# Patient Record
Sex: MALE | Race: WHITE | NOT HISPANIC OR LATINO | Employment: OTHER | URBAN - METROPOLITAN AREA
[De-identification: names, ages, dates, MRNs, and addresses within clinical notes are randomized per-mention and may not be internally consistent; named-entity substitution may affect disease eponyms.]

---

## 2017-01-17 ENCOUNTER — TRANSFERRED RECORDS (OUTPATIENT)
Dept: HEALTH INFORMATION MANAGEMENT | Facility: CLINIC | Age: 62
End: 2017-01-17

## 2019-10-03 ENCOUNTER — HEALTH MAINTENANCE LETTER (OUTPATIENT)
Age: 64
End: 2019-10-03

## 2020-02-08 ENCOUNTER — HEALTH MAINTENANCE LETTER (OUTPATIENT)
Age: 65
End: 2020-02-08

## 2020-02-25 ENCOUNTER — TRANSFERRED RECORDS (OUTPATIENT)
Dept: HEALTH INFORMATION MANAGEMENT | Facility: CLINIC | Age: 65
End: 2020-02-25

## 2020-04-29 ENCOUNTER — MEDICAL CORRESPONDENCE (OUTPATIENT)
Dept: HEALTH INFORMATION MANAGEMENT | Facility: CLINIC | Age: 65
End: 2020-04-29

## 2020-04-29 ENCOUNTER — TRANSFERRED RECORDS (OUTPATIENT)
Dept: HEALTH INFORMATION MANAGEMENT | Facility: CLINIC | Age: 65
End: 2020-04-29

## 2020-04-29 ENCOUNTER — VIRTUAL VISIT (OUTPATIENT)
Dept: CARDIOLOGY | Facility: CLINIC | Age: 65
End: 2020-04-29
Payer: MEDICARE

## 2020-04-29 VITALS
WEIGHT: 287 LBS | SYSTOLIC BLOOD PRESSURE: 127 MMHG | HEART RATE: 53 BPM | BODY MASS INDEX: 41.18 KG/M2 | DIASTOLIC BLOOD PRESSURE: 62 MMHG

## 2020-04-29 DIAGNOSIS — R00.2 PALPITATIONS: ICD-10-CM

## 2020-04-29 DIAGNOSIS — I71.20 THORACIC AORTIC ANEURYSM WITHOUT RUPTURE (H): ICD-10-CM

## 2020-04-29 DIAGNOSIS — I20.0 UNSTABLE ANGINA PECTORIS (H): Primary | ICD-10-CM

## 2020-04-29 DIAGNOSIS — R09.02 HYPOXIA: ICD-10-CM

## 2020-04-29 PROCEDURE — 99214 OFFICE O/P EST MOD 30 MIN: CPT | Mod: 95 | Performed by: INTERNAL MEDICINE

## 2020-04-29 RX ORDER — TAMSULOSIN HYDROCHLORIDE 0.4 MG/1
0.8 CAPSULE ORAL DAILY
Status: ON HOLD | COMMUNITY
End: 2021-07-29

## 2020-04-29 RX ORDER — SERTRALINE HYDROCHLORIDE 100 MG/1
100 TABLET, FILM COATED ORAL DAILY
COMMUNITY
End: 2022-06-29

## 2020-04-29 RX ORDER — MECLIZINE HCL 25MG 25 MG/1
25 TABLET, CHEWABLE ORAL EVERY 6 HOURS PRN
COMMUNITY

## 2020-04-29 RX ORDER — ACETAMINOPHEN 500 MG
500-1000 TABLET ORAL EVERY 6 HOURS PRN
COMMUNITY

## 2020-04-29 RX ORDER — CETIRIZINE HYDROCHLORIDE 10 MG/1
10 TABLET ORAL DAILY
COMMUNITY
End: 2022-02-16

## 2020-04-29 NOTE — LETTER
4/29/2020    Calvin Desai MD    [facility]   [address] [place] [address]     RE: Aiden Almaraz  [address]  [facility] [address]       Dear Colleague,    Thank you for the opportunity to participate in the care of your patient, Aiden Almaraz, at the [facility] at [facility]. Please see a copy of my visit note below.     HISTORY OF PRESENT ILLNESS:  I had a [place] video conference with Aiden Almaraz.  I have not seen him for over 4 years.  When I saw him 4 years ago, he was having some atypical chest pain and a nuclear stress test was abnormal.  However, the patient was significantly overweight and there was concern if it was a false positive test.  Fortunately, the angiogram showed that he had a 35% LAD lesion and no other disease and so therefore, the EF from the nuclear test was falsely low and it was a false positive test.  He has a slightly dilated aortic aneurysm of 40 mm.  He has sleep apnea and sees Dr. No, although I do not know when he last saw her and he wears a CPAP.  Since I saw him 4 years ago, he had a concussion.  He apparently slipped on the ice, hit his head, was unconscious for some time.  Since that time, he has had memory issues and dizziness.  However, separate from that, this was a scheduled visit 4 years later to follow up on his aorta and see how he is doing and see his cholesterol numbers.  This ended up being a much longer visit than expected.  Because of COVID, none of the tests were done, although he did get blood work at his family doctor's office.  He reports glucose elevated at 125.  HDL low at 31.  He did give me the LDL number but the triglycerides were 197 and a total cholesterol of 143.  This is classic metabolic syndrome.  Separately, he mentions a nonspecific flutter in his chest, particularly when he lies down at night and it lasts for up to 5 minutes.  It  occurs every other day.  He also describes atypical chest discomfort, not like what he had 4 years ago.  He developed a bubble feeling or pressure in his chest that goes up towards his right neck.  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  He tells me that when he was in his doctor's office, they checked his O2 sat.  It was 86% and they told him to take several breaths in a row and it came up to 92%.  He does not have what sounds like much in the way of edema.  According to our chart, he sees Dr. No and he had PFTs some time ago that showed some mild restrictive lung disease and he does wear a CPAP machine.  He is overweight.  I am going to recommend the following:  Because of the COVID and trying to keep this easy for him, I am going to have him wear a Zio Patch to look and see if there are any arrhythmias or AFib or anything else that is going on when he describes these several-minute episodes of palpitation.  I am not going to do a nuclear stress test because it gave us a false positive reading once before but I am going to do an MRI stress test.  This will allow us to look and see if there has been progression of his underlying coronary disease.  It will also allow us to look at his aortic diameter to determine if it is getting larger.  It will give us a look at his right heart and I will do a limited bubble echo to make sure there is not a PFO.  I will see if we can also check his O2 sat at rest and then walking in the adams to see if by chance it drops.  If we do not find a cardiac cause for his hypoxia or anything else, I will send him back to the lung doctor and he may need formal oximetry to determine if he should be wearing oxygen during the day if indeed he really is hypoxic down to 86% during the daytime hours or particularly with exercise.  I asked the patient to Google metabolic syndrome and read about it  and work on diet and exercise.  I do not know if he had a hemoglobin A1c test with his family doctor.  That would be important because if it is elevated, metformin may help with his lipid abnormality also.       Today's visit was video conference for 33 minutes.     Please do not hesitate to contact me if you have any questions/concerns.     Sincerely,     Dago Mckeon MD    cc:    Susan No MD    [facility]   [address]   [place] [address]

## 2020-04-29 NOTE — PROGRESS NOTES
Service Date: 04/29/2020      VIRTUAL VISIT      [address] video conference from 1:30 p.m. to 2:03 p.m.       HISTORY OF PRESENT ILLNESS:  I had a [address] video conference with Aiden Almaraz.  I have not seen him for over 4 years.  When I saw him 4 years ago, he was having some atypical chest pain and a nuclear stress test was abnormal.  However, the patient was significantly overweight and there was concern if it was a false positive test.  Fortunately, the angiogram showed that he had a 35% LAD lesion and no other disease and so therefore, the EF from the nuclear test was falsely low and it was a false positive test.  He has a slightly dilated aortic aneurysm of 40 mm.  He has sleep apnea and sees Dr. No, although I do not know when he last saw her and he wears a CPAP.  Since I saw him 4 years ago, he had a concussion.  He apparently slipped on the ice, hit his head, was unconscious for some time.  Since that time, he has had memory issues and dizziness.  However, separate from that, this was a scheduled visit 4 years later to follow up on his aorta and see how he is doing and see his cholesterol numbers.  This ended up being a much longer visit than expected.  Because of COVID, none of the tests were done, although he did get blood work at his family doctor's office.  He reports glucose elevated at 125.  HDL low at 31.  He did give me the LDL number but the triglycerides were 197 and a total cholesterol of 143.  This is classic metabolic syndrome.  Separately, he mentions a nonspecific flutter in his chest, particularly when he lies down at night and it lasts for up to 5 minutes.  It occurs every other day.  He also describes atypical chest discomfort, not like what he had 4 years ago.  He developed a bubble feeling or pressure in his chest that goes up towards his right neck.  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  He tells me that when he was in his doctor's office, they checked his O2 sat.  It was 86% and they told him to take several breaths in a row and it came up to 92%.  He does not have what sounds like much in the way of edema.  According to our chart, he sees Dr. No and he had PFTs some time ago that showed some mild restrictive lung disease and he does wear a CPAP machine.  He is overweight.  I am going to recommend the following:  Because of the COVID and trying to keep this easy for him, I am going to have him wear a Zio Patch to look and see if there are any arrhythmias or AFib or anything else that is going on when he describes these several-minute episodes of palpitation.  I am not going to do a nuclear stress test because it gave us a false positive reading once before but I am going to do an MRI stress test.  This will allow us to look and see if there has been progression of his underlying coronary disease.  It will also allow us to look at his aortic diameter to determine if it is getting larger.  It will give us a look at his right heart and I will do a limited bubble echo to make sure there is not a PFO.  I will see if we can also check his O2 sat at rest and then walking in the adams to see if by chance it drops.  If we do not find a cardiac cause for his hypoxia or anything else, I will send him back to the lung doctor and he may need formal oximetry to determine if he should be wearing oxygen during the day if indeed he really is hypoxic down to 86% during the daytime hours or particularly with exercise.  I asked the patient to Google metabolic syndrome and read about it and work on diet and exercise.  I do not know if he had a hemoglobin A1c test with his family doctor.  That would be important because if it is elevated, metformin may help with his lipid abnormality also.       Today's visit was video conference for 33 minutes.      Dago Mckeon MD       cc:   Calvin Desai MD     [place] [address] [place] [address]      Susan No MD    [facility]   [address]   [place] [address]         XI THORNE MD             D: 2020   T: 2020   MT: [email or web address]      Name:     MATY NGUYỄN   MRN:      [id number]        Account:      [id number]   :      1955           Service Date: 2020      Document: [id number]

## 2020-04-29 NOTE — PROGRESS NOTES
"Review Of Systems:  Skin: NEGATIVE  Eyes:Ears/Nose/Throat: NEGATIVE  Respiratory: NEGATIVE  Cardiovascular:NEGATIVE  Gastrointestinal: NEGATIVE  Genitourinary:NEGATIVE   Musculoskeletal: NEGATIVE  Neurologic: NEGATIVE  Psychiatric: NEGATIVE  Hematologic/Lymphatic/Immunologic: NEGATIVE  Endocrine:  NEGATIVE    Reviewed:  4/29/20  AYUSH Lane        Aiden Almaraz is a 64 year old male who is being evaluated via a billable video visit.      The patient has been notified of following:     \"This video visit will be conducted via a call between you and your physician/provider. We have found that certain health care needs can be provided without the need for an in-person physical exam.  This service lets us provide the care you need with a video conversation.  If a prescription is necessary we can send it directly to your pharmacy.  If lab work is needed we can place an order for that and you can then stop by our lab to have the test done at a later time.    Video visits are billed at different rates depending on your insurance coverage.  Please reach out to your insurance provider with any questions.    If during the course of the call the physician/provider feels a video visit is not appropriate, you will not be charged for this service.\"    Patient has given verbal consent for Video visit? Yes  4/29/20  - Writer spoke w/pt via phone.  He is expecting the video visit w/Dr. Mike Lane LPN    How would you like to obtain your AVS? [facility]    Patient would like the video invitation sent by: Text to cell phone: [phone]    Will anyone else be joining your video visit?         Video-Visit Details    Type of service:  Video Visit    Video Start Time:130p  Video End Time:203p  Originating Location (pt. Location): Home    Distant Location (provider location):  [facility]     Mode of Communication:  Video Conference via [employer] s[employer]           "

## 2020-05-20 ENCOUNTER — TELEPHONE (OUTPATIENT)
Dept: CARDIOLOGY | Facility: CLINIC | Age: 65
End: 2020-05-20

## 2020-05-20 ENCOUNTER — TRANSFERRED RECORDS (OUTPATIENT)
Dept: HEALTH INFORMATION MANAGEMENT | Facility: CLINIC | Age: 65
End: 2020-05-20

## 2020-05-21 ENCOUNTER — HOSPITAL ENCOUNTER (OUTPATIENT)
Dept: CARDIOLOGY | Facility: CLINIC | Age: 65
Discharge: HOME | End: 2020-05-21
Attending: INTERNAL MEDICINE | Admitting: INTERNAL MEDICINE
Payer: MEDICARE

## 2020-05-21 VITALS — DIASTOLIC BLOOD PRESSURE: 66 MMHG | HEART RATE: 51 BPM | SYSTOLIC BLOOD PRESSURE: 113 MMHG

## 2020-05-21 DIAGNOSIS — R00.2 PALPITATIONS: ICD-10-CM

## 2020-05-21 DIAGNOSIS — I20.0 UNSTABLE ANGINA PECTORIS (H): ICD-10-CM

## 2020-05-21 DIAGNOSIS — I71.20 THORACIC AORTIC ANEURYSM WITHOUT RUPTURE (H): ICD-10-CM

## 2020-05-21 DIAGNOSIS — R09.02 HYPOXIA: ICD-10-CM

## 2020-05-21 DIAGNOSIS — I25.10 CAD (CORONARY ARTERY DISEASE): Primary | ICD-10-CM

## 2020-05-21 LAB
CREAT BLD-MCNC: 1.2 MG/DL (ref 0.66–1.25)
GFR SERPL CREATININE-BSD FRML MDRD: 61 ML/MIN/{1.73_M2}

## 2020-05-21 PROCEDURE — 0296T ZIO PATCH HOLTER ADULT PEDIATRIC GREATER THAN 48 HRS: CPT

## 2020-05-21 PROCEDURE — 93321 DOPPLER ECHO F-UP/LMTD STD: CPT | Mod: 26 | Performed by: INTERNAL MEDICINE

## 2020-05-21 PROCEDURE — 75563 CARD MRI W/STRESS IMG & DYE: CPT | Mod: 26 | Performed by: INTERNAL MEDICINE

## 2020-05-21 PROCEDURE — 25000128 H RX IP 250 OP 636: Performed by: INTERNAL MEDICINE

## 2020-05-21 PROCEDURE — 82565 ASSAY OF CREATININE: CPT

## 2020-05-21 PROCEDURE — 93325 DOPPLER ECHO COLOR FLOW MAPG: CPT | Mod: 26 | Performed by: INTERNAL MEDICINE

## 2020-05-21 PROCEDURE — A9585 GADOBUTROL INJECTION: HCPCS | Performed by: INTERNAL MEDICINE

## 2020-05-21 PROCEDURE — 40000264 ECHOCARDIOGRAM LIMITED

## 2020-05-21 PROCEDURE — 93016 CV STRESS TEST SUPVJ ONLY: CPT | Performed by: INTERNAL MEDICINE

## 2020-05-21 PROCEDURE — 93017 CV STRESS TEST TRACING ONLY: CPT

## 2020-05-21 PROCEDURE — 25500064 ZZH RX 255 OP 636: Performed by: INTERNAL MEDICINE

## 2020-05-21 PROCEDURE — 0298T ZIO PATCH HOLTER ADULT PEDIATRIC GREATER THAN 48 HRS: CPT | Performed by: INTERNAL MEDICINE

## 2020-05-21 PROCEDURE — 93308 TTE F-UP OR LMTD: CPT | Mod: 26 | Performed by: INTERNAL MEDICINE

## 2020-05-21 PROCEDURE — 93018 CV STRESS TEST I&R ONLY: CPT | Performed by: INTERNAL MEDICINE

## 2020-05-21 RX ORDER — GADOBUTROL 604.72 MG/ML
5-65 INJECTION INTRAVENOUS ONCE
Status: COMPLETED | OUTPATIENT
Start: 2020-05-21 | End: 2020-05-21

## 2020-05-21 RX ORDER — ALBUTEROL SULFATE 90 UG/1
2 AEROSOL, METERED RESPIRATORY (INHALATION) EVERY 5 MIN PRN
Status: DISCONTINUED | OUTPATIENT
Start: 2020-05-21 | End: 2020-05-22 | Stop reason: HOSPADM

## 2020-05-21 RX ORDER — METHYLPREDNISOLONE SODIUM SUCCINATE 125 MG/2ML
125 INJECTION, POWDER, LYOPHILIZED, FOR SOLUTION INTRAMUSCULAR; INTRAVENOUS
Status: DISCONTINUED | OUTPATIENT
Start: 2020-05-21 | End: 2020-05-22 | Stop reason: HOSPADM

## 2020-05-21 RX ORDER — REGADENOSON 0.08 MG/ML
0.4 INJECTION, SOLUTION INTRAVENOUS ONCE
Status: DISCONTINUED | OUTPATIENT
Start: 2020-05-21 | End: 2020-05-22 | Stop reason: HOSPADM

## 2020-05-21 RX ORDER — ONDANSETRON 2 MG/ML
4 INJECTION INTRAMUSCULAR; INTRAVENOUS
Status: DISCONTINUED | OUTPATIENT
Start: 2020-05-21 | End: 2020-05-22 | Stop reason: HOSPADM

## 2020-05-21 RX ORDER — REGADENOSON 0.08 MG/ML
0.4 INJECTION, SOLUTION INTRAVENOUS ONCE
Status: COMPLETED | OUTPATIENT
Start: 2020-05-21 | End: 2020-05-21

## 2020-05-21 RX ORDER — AMINOPHYLLINE 25 MG/ML
50-100 INJECTION, SOLUTION INTRAVENOUS
Status: DISCONTINUED | OUTPATIENT
Start: 2020-05-21 | End: 2020-05-22 | Stop reason: HOSPADM

## 2020-05-21 RX ORDER — AMINOPHYLLINE 25 MG/ML
100 INJECTION, SOLUTION INTRAVENOUS ONCE
Status: DISCONTINUED | OUTPATIENT
Start: 2020-05-21 | End: 2020-05-22 | Stop reason: HOSPADM

## 2020-05-21 RX ORDER — ACYCLOVIR 200 MG/1
0-1 CAPSULE ORAL
Status: DISCONTINUED | OUTPATIENT
Start: 2020-05-21 | End: 2020-05-22 | Stop reason: HOSPADM

## 2020-05-21 RX ORDER — DIPHENHYDRAMINE HYDROCHLORIDE 50 MG/ML
25-50 INJECTION INTRAMUSCULAR; INTRAVENOUS
Status: DISCONTINUED | OUTPATIENT
Start: 2020-05-21 | End: 2020-05-22 | Stop reason: HOSPADM

## 2020-05-21 RX ORDER — DIPHENHYDRAMINE HCL 25 MG
25 CAPSULE ORAL
Status: DISCONTINUED | OUTPATIENT
Start: 2020-05-21 | End: 2020-05-22 | Stop reason: HOSPADM

## 2020-05-21 RX ORDER — DIAZEPAM 5 MG
5 TABLET ORAL EVERY 30 MIN PRN
Status: DISCONTINUED | OUTPATIENT
Start: 2020-05-21 | End: 2020-05-22 | Stop reason: HOSPADM

## 2020-05-21 RX ADMIN — HUMAN ALBUMIN MICROSPHERES AND PERFLUTREN 9 ML: 10; .22 INJECTION, SOLUTION INTRAVENOUS at 10:45

## 2020-05-21 RX ADMIN — GADOBUTROL 32 ML: 604.72 INJECTION INTRAVENOUS at 09:49

## 2020-05-21 RX ADMIN — REGADENOSON 0.4 MG: 0.08 INJECTION, SOLUTION INTRAVENOUS at 09:04

## 2020-05-28 ENCOUNTER — MYC MEDICAL ADVICE (OUTPATIENT)
Dept: CARDIOLOGY | Facility: CLINIC | Age: 65
End: 2020-05-28

## 2020-06-22 ENCOUNTER — VIRTUAL VISIT (OUTPATIENT)
Dept: CARDIOLOGY | Facility: CLINIC | Age: 65
End: 2020-06-22
Attending: INTERNAL MEDICINE
Payer: MEDICARE

## 2020-06-22 VITALS
HEART RATE: 74 BPM | WEIGHT: 278 LBS | SYSTOLIC BLOOD PRESSURE: 134 MMHG | BODY MASS INDEX: 39.89 KG/M2 | DIASTOLIC BLOOD PRESSURE: 76 MMHG

## 2020-06-22 DIAGNOSIS — I20.0 UNSTABLE ANGINA PECTORIS (H): ICD-10-CM

## 2020-06-22 DIAGNOSIS — R00.2 PALPITATIONS: ICD-10-CM

## 2020-06-22 DIAGNOSIS — I26.09 ACUTE COR PULMONALE (H): Primary | ICD-10-CM

## 2020-06-22 DIAGNOSIS — R09.02 HYPOXIA: ICD-10-CM

## 2020-06-22 DIAGNOSIS — I71.20 THORACIC AORTIC ANEURYSM WITHOUT RUPTURE (H): ICD-10-CM

## 2020-06-22 PROCEDURE — 99214 OFFICE O/P EST MOD 30 MIN: CPT | Mod: 95 | Performed by: INTERNAL MEDICINE

## 2020-06-22 NOTE — PROGRESS NOTES
"Aiden Almaraz is a 64 year old male who is being evaluated via a billable video visit.      The patient has been notified of following:     \"This video visit will be conducted via a call between you and your physician/provider. We have found that certain health care needs can be provided without the need for an in-person physical exam.  This service lets us provide the care you need with a video conversation.  If a prescription is necessary we can send it directly to your pharmacy.  If lab work is needed we can place an order for that and you can then stop by our lab to have the test done at a later time.    Video visits are billed at different rates depending on your insurance coverage.  Please reach out to your insurance provider with any questions.    If during the course of the call the physician/provider feels a video visit is not appropriate, you will not be charged for this service.\"    Patient has given verbal consent for Video visit? Yes    Please text patient at [phone]    Will anyone else be joining your video visit? No        Video-Visit Details    Type of service:  Video Visit    Video Start Time: 844Video End Time: 902    Originating Location (pt. Location):[facility]  Distant Location (provider location):  [facility]     Platform used for Video Visit: TAZ THORNE          Review Of Systems  Skin: pigmentation, Lower legs purplish in color   Eyes: negative  Ears/Nose/Throat: negative  Respiratory: Shortness of breath- SOB when laying in bed needs to breath through mouth   Cardiovascular: lower extremity edema  Gastrointestinal: reflux  Genitourinary: frequency  Musculoskeletal: joint pain  Neurologic: negative  Psychiatric: excessive stress, anxiety   Hematologic/Lymphatic/Immunologic: negative  Endocrine: negative    Patient reported vitals:  BP: 134/76  Heart rate:74  Weight:278lb    Ayesha Godwin CMA    "

## 2020-06-22 NOTE — PROGRESS NOTES
Service Date: 06/22/2020      VIDEO VIRTUAL VISIT      HISTORY OF PRESENT ILLNESS:  This is a video virtual office visit on Aiden Almaraz from 8:44 a.m. to 9:02 a.m.  It is a followup from my visit date 04/29/2020.  The reader is referred to that.  Basically, this gentleman has been a patient of ours for a number of years.  I had not seen him for 4 years before this.  He had atypical chest pain in 2016.  A nuclear stress test was abnormal, but we were concerned because of body habitus it was false positive.  Indeed, it was.  The angiogram, left heart catheterization only, showed a 35% LAD lesion.  The ejection fraction was actually normal.  The nuclear test called both a low EF and reduced blood flow, but that was not true.  He also had mild aortic root enlargement.  He was being seen by Dr. No from Pulmonary Medicine and he has sleep apnea.  He does wear CPAP.  He has now switched over to [facility].  He had a previous episode of concussion which has left him with some memory issues and dizziness.  When I got my attention in the April visit was that he still reports some atypical chest pain, but he also mentioned some palpitations, significant fatigue, shortness of breath and he told me at his doctor's office, and they noted that his resting O2 sat was 86%.    They asked him to take several breaths in a row and it came up to 92.  Our records show that with Dr. No several years ago, pulmonary function test showed mild restrictive lung disease, and he does, as I mention, use CPAP.  He told me [facility] recently did lung capacity studies which I assume are either full pulmonary function tests were perhaps just limited office and that everything looks good.  He also mentioned fluttering.  Because of the atypical chest pain and the previous falsely abnormal nuclear test, I elected to do several tests.  We did an MRI test for ischemia.  We did an echocardiogram with bubble study to look for  pulmonary hypertension and a possible PFO to account for the hypoxia and we did a 14-day Zio Patch.  The Zio Patch showed rare PACs and PVCs, no atrial flutter, no atrial fib.  The echocardiogram was limited and they had difficulty because of again body habitus.  They report left ventricular size and function normal with perhaps some mild hypokinesis of the basal inferolateral wall.  The right ventricle, however, they report is moderately dilated with moderate decrease in RV systolic function.  They again report mildly dilated aorta at 40 mm at the base and 42 mm in the ascending portion.  The bubble study to look for PFO they said was suboptimal and they did not comment on it.   The tricuspid valve was not well seen, and they could not estimate right ventricular systolic pressures.  It should be noted that in 2015 we did do a transthoracic echo, and at that time, they reported no PFO or atrial septal defect.  They were not able to estimate right heart pressures at that time.  At that time, they said the right heart size and function was normal.  On the cardiac MRI from last month, they report left ventricle is normal in size and function, no wall motion abnormality.  The right ventricle they again note is abnormal.  They report some nonspecific scarring noted in the RV attachment region in the inferior septum, which can occasionally be seen with pulmonary hypertension.  They do note mildly dilated right ventricle with mild decrease in RV systolic function, quantitative RVEF of 54%.  LVEF 70%.  The stress perfusion test was negative for any inducible ischemia.  At this point, my suspicion is that this is going to probably be some form of cor pulmonale.  Perhaps pickwickian syndrome type.  My suspicion for shunt is relatively low because with extra breath reportedly his oxygen level normalized, and usually with a shunt, one cannot normalize it with supplemental oxygen or extra breathing.  Again, I suspect this is  going to be hypoventilation related to his body habitus.  I am going to recommend the following:  We will do a transesophageal echo to get another look and make sure there is no abnormal connections such as anomalous pulmonary venous return, although that should have been seen on the MRI.  We will do a bubble study again just to make sure that there is not atrial septal defect or PFO.  We will then do a right heart catheterization to determine if there is pulmonary hypertension or not.  After we obtain that, I think we should send him back to [facility].  He probably should be wearing oxygen or at least we should measure oxygen level when he is walking.  He told me the last time his doctor checked, it was 92%, but as I mentioned, it was actually 86% before the deep breathing.      cc:   Calvin Desai MD   [address]  [address]      [facility]   Pulmonary Clinic   [address] [place]  [address]         XI THORNE MD             D: 2020   T: 2020   MT: [email or web address]      Name:     MATY NGUYỄN   MRN:      [id number]        Account:      [id number]   :      1955           Service Date: 2020      Document: [id number]

## 2020-06-22 NOTE — LETTER
6/22/2020    Calvin SHRESTHA Giselle  [facility] [address] [address]    RE: Aiden Almaraz       Dear Colleague,    I had the pleasure of seeing Aiden Almaraz in the [facility].    Aiden Almaraz is a 64 year old male who is being evaluated via a billable video visit.        Service Date: 06/22/2020      VIDEO VIRTUAL VISIT      HISTORY OF PRESENT ILLNESS:  This is a video virtual office visit on Aiden Almaraz from 8:44 a.m. to 9:02 a.m.  It is a followup from my visit date 04/29/2020.  The reader is referred to that.  Basically, this gentleman has been a patient of ours for a number of years.  I had not seen him for 4 years before this.  He had atypical chest pain in 2016.  A nuclear stress test was abnormal, but we were concerned because of body habitus it was false positive.  Indeed, it was.  The angiogram, left heart catheterization only, showed a 35% LAD lesion.  The ejection fraction was actually normal.  The nuclear test called both a low EF and reduced blood flow, but that was not true.  He also had mild aortic root enlargement.  He was being seen by Dr. No from Pulmonary Medicine and he has sleep apnea.  He does wear CPAP.  He has now switched over to [facility].  He had a previous episode of concussion which has left him with some memory issues and dizziness.  When I got my attention in the April visit was that he still reports some atypical chest pain, but he also mentioned some palpitations, significant fatigue, shortness of breath and he told me at his doctor's office, and they noted that his resting O2 sat was 86%.    They asked him to take several breaths in a row and it came up to 92.  Our records show that with Dr. No several years ago, pulmonary function test showed mild restrictive lung disease, and he does, as I mention, use CPAP.  He told me [facility] recently did lung capacity studies which I assume are either  full pulmonary function tests were perhaps just limited office and that everything looks good.  He also mentioned fluttering.  Because of the atypical chest pain and the previous falsely abnormal nuclear test, I elected to do several tests.  We did an MRI test for ischemia.  We did an echocardiogram with bubble study to look for pulmonary hypertension and a possible PFO to account for the hypoxia and we did a 14-day Zio Patch.  The Zio Patch showed rare PACs and PVCs, no atrial flutter, no atrial fib.  The echocardiogram was limited and they had difficulty because of again body habitus.  They report left ventricular size and function normal with perhaps some mild hypokinesis of the basal inferolateral wall.  The right ventricle, however, they report is moderately dilated with moderate decrease in RV systolic function.  They again report mildly dilated aorta at 40 mm at the base and 42 mm in the ascending portion.  The bubble study to look for PFO they said was suboptimal and they did not comment on it.   The tricuspid valve was not well seen, and they could not estimate right ventricular systolic pressures.  It should be noted that in 2015 we did do a transthoracic echo, and at that time, they reported no PFO or atrial septal defect.  They were not able to estimate right heart pressures at that time.  At that time, they said the right heart size and function was normal.  On the cardiac MRI from last month, they report left ventricle is normal in size and function, no wall motion abnormality.  The right ventricle they again note is abnormal.  They report some nonspecific scarring noted in the RV attachment region in the inferior septum, which can occasionally be seen with pulmonary hypertension.  They do note mildly dilated right ventricle with mild decrease in RV systolic function, quantitative RVEF of 54%.  LVEF 70%.  The stress perfusion test was negative for any inducible ischemia.  At this point, my suspicion is  that this is going to probably be some form of cor pulmonale.  Perhaps pickwickian syndrome type.  My suspicion for shunt is relatively low because with extra breath reportedly his oxygen level normalized, and usually with a shunt, one cannot normalize it with supplemental oxygen or extra breathing.  Again, I suspect this is going to be hypoventilation related to his body habitus.  I am going to recommend the following:  We will do a transesophageal echo to get another look and make sure there is no abnormal connections such as anomalous pulmonary venous return, although that should have been seen on the MRI.  We will do a bubble study again just to make sure that there is not atrial septal defect or PFO.  We will then do a right heart catheterization to determine if there is pulmonary hypertension or not.  After we obtain that, I think we should send him back to [facility].  He probably should be wearing oxygen or at least we should measure oxygen level when he is walking.  He told me the last time his doctor checked, it was 92%, but as I mentioned, it was actually 86% before the deep breathing.      Thank you for allowing me to participate in the care of your patient.    Sincerely,     Dago Mckeon MD     [facility]    cc:   [facility]   Pulmonary Clinic   [address] [place]  [address]

## 2020-06-25 ENCOUNTER — HOSPITAL ENCOUNTER (OUTPATIENT)
Facility: CLINIC | Age: 65
End: 2020-06-25
Payer: MEDICARE

## 2020-06-25 DIAGNOSIS — Z11.59 ENCOUNTER FOR SCREENING FOR OTHER VIRAL DISEASES: Primary | ICD-10-CM

## 2020-06-25 DIAGNOSIS — R09.02 HYPOXIA: ICD-10-CM

## 2020-06-25 DIAGNOSIS — I26.09 ACUTE COR PULMONALE (H): ICD-10-CM

## 2020-07-06 ENCOUNTER — AMBULATORY - HEALTHEAST (OUTPATIENT)
Dept: INTERNAL MEDICINE | Facility: CLINIC | Age: 65
End: 2020-07-06

## 2020-07-06 DIAGNOSIS — Z11.59 ENCOUNTER FOR SCREENING FOR OTHER VIRAL DISEASES: ICD-10-CM

## 2020-07-07 ENCOUNTER — AMBULATORY - HEALTHEAST (OUTPATIENT)
Dept: FAMILY MEDICINE | Facility: CLINIC | Age: 65
End: 2020-07-07

## 2020-07-07 ENCOUNTER — TELEPHONE (OUTPATIENT)
Dept: CARDIOLOGY | Facility: CLINIC | Age: 65
End: 2020-07-07

## 2020-07-07 DIAGNOSIS — R09.02 HYPOXIA: Primary | ICD-10-CM

## 2020-07-07 DIAGNOSIS — I27.20 PULMONARY HYPERTENSION (H): ICD-10-CM

## 2020-07-07 DIAGNOSIS — Z11.59 ENCOUNTER FOR SCREENING FOR OTHER VIRAL DISEASES: ICD-10-CM

## 2020-07-07 RX ORDER — LIDOCAINE 40 MG/G
CREAM TOPICAL
Status: CANCELLED | OUTPATIENT
Start: 2020-07-07

## 2020-07-07 RX ORDER — POTASSIUM CHLORIDE 750 MG/1
20 TABLET, EXTENDED RELEASE ORAL
Status: CANCELLED | OUTPATIENT
Start: 2020-07-07

## 2020-07-07 RX ORDER — ASPIRIN 81 MG/1
81 TABLET ORAL DAILY
Status: CANCELLED | OUTPATIENT
Start: 2020-07-07 | End: 2020-07-09

## 2020-07-07 RX ORDER — SODIUM CHLORIDE 9 MG/ML
INJECTION, SOLUTION INTRAVENOUS CONTINUOUS
Status: CANCELLED | OUTPATIENT
Start: 2020-07-07

## 2020-07-07 NOTE — PROGRESS NOTES
Orders entered: Right heart cath order set.        Kirit RN, BSN    [facility]

## 2020-07-07 NOTE — TELEPHONE ENCOUNTER
Patient is scheduled for a LUIS A and Right Coronary Angiogram    Date: 07-09-20  Location: [facility]   Check-in time: 630 am     LUIS A  Procedure time: 830 am    Right Coronary Angiogram    Procedure time: 10 am     See instructions below...     Patient to be NPO after midnight, the night before the procedure.     Patient to avoid antacids on the morning of the procedure.     Patient does take 81 mg of Aspirin daily in the evening.     Patient will take 81 mg of Aspirin on the day before the procedure in the am, and 81 mg of Aspirin on the morning of the procedure. On 07-10-20, patient may resume and take his Aspirin in the evening as before.        Patient should also take Coreg on the morning of the procedure with small sips of water.    Patient should take the rest of his medications when he returns home.     Patient will need someone to drive him home and someone to stay with him for 24 hours after the procedure.     Prep instructions were reviewed with patient over the phone: Yes  Patient was advised to quarantine until procedure: Yes    COVID-19 testing completed today, results pending.    Reviewed visitor restriction: Yes  Patient informed to wear a mask: Yes      Instructions were also sent to patient via [facility].     Kirit RN, BSN  [facility]

## 2020-07-08 LAB
SARS-COV-2 PCR COMMENT: NORMAL
SARS-COV-2 RNA SPEC QL NAA+PROBE: NEGATIVE
SARS-COV-2 VIRUS SPECIMEN SOURCE: NORMAL

## 2020-07-09 ENCOUNTER — HOSPITAL ENCOUNTER (OUTPATIENT)
Facility: CLINIC | Age: 65
Discharge: HOME | End: 2020-07-09
Attending: INTERNAL MEDICINE | Admitting: INTERNAL MEDICINE
Payer: MEDICARE

## 2020-07-09 ENCOUNTER — HOSPITAL ENCOUNTER (OUTPATIENT)
Dept: CARDIOLOGY | Facility: CLINIC | Age: 65
End: 2020-07-09
Attending: INTERNAL MEDICINE | Admitting: INTERNAL MEDICINE
Payer: MEDICARE

## 2020-07-09 VITALS
TEMPERATURE: 98.2 F | OXYGEN SATURATION: 96 % | DIASTOLIC BLOOD PRESSURE: 75 MMHG | HEIGHT: 70 IN | BODY MASS INDEX: 40.8 KG/M2 | WEIGHT: 285 LBS | RESPIRATION RATE: 16 BRPM | HEART RATE: 56 BPM | SYSTOLIC BLOOD PRESSURE: 120 MMHG

## 2020-07-09 DIAGNOSIS — I26.09 ACUTE COR PULMONALE (H): ICD-10-CM

## 2020-07-09 DIAGNOSIS — R09.02 HYPOXIA: ICD-10-CM

## 2020-07-09 DIAGNOSIS — I27.20 PULMONARY HYPERTENSION (H): ICD-10-CM

## 2020-07-09 PROBLEM — Z98.890 STATUS POST CORONARY ANGIOGRAM: Status: ACTIVE | Noted: 2020-07-09

## 2020-07-09 LAB
ANION GAP SERPL CALCULATED.3IONS-SCNC: 5 MMOL/L (ref 3–14)
APTT PPP: 30 SEC (ref 22–37)
BUN SERPL-MCNC: 24 MG/DL (ref 7–30)
CALCIUM SERPL-MCNC: 8.6 MG/DL (ref 8.5–10.1)
CHLORIDE SERPL-SCNC: 111 MMOL/L (ref 94–109)
CO2 BLD-SCNC: 25 MMOL/L (ref 21–28)
CO2 SERPL-SCNC: 25 MMOL/L (ref 20–32)
CREAT SERPL-MCNC: 0.97 MG/DL (ref 0.66–1.25)
ERYTHROCYTE [DISTWIDTH] IN BLOOD BY AUTOMATED COUNT: 13.7 % (ref 10–15)
GFR SERPL CREATININE-BSD FRML MDRD: 81 ML/MIN/{1.73_M2}
GLUCOSE SERPL-MCNC: 130 MG/DL (ref 70–99)
HCT VFR BLD AUTO: 42.9 % (ref 40–53)
HGB BLD-MCNC: 14.2 G/DL (ref 13.3–17.7)
INR PPP: 1.09 (ref 0.86–1.14)
MCH RBC QN AUTO: 31 PG (ref 26.5–33)
MCHC RBC AUTO-ENTMCNC: 33.1 G/DL (ref 31.5–36.5)
MCV RBC AUTO: 94 FL (ref 78–100)
PCO2 BLD: 46 MM HG (ref 35–45)
PH BLD: 7.34 PH (ref 7.35–7.45)
PLATELET # BLD AUTO: 149 10E9/L (ref 150–450)
PO2 BLD: 36 MM HG (ref 80–105)
POTASSIUM SERPL-SCNC: 3.8 MMOL/L (ref 3.4–5.3)
RBC # BLD AUTO: 4.58 10E12/L (ref 4.4–5.9)
SAO2 % BLDA FROM PO2: 65 % (ref 92–100)
SODIUM SERPL-SCNC: 141 MMOL/L (ref 133–144)
WBC # BLD AUTO: 6.9 10E9/L (ref 4–11)

## 2020-07-09 PROCEDURE — 93451 RIGHT HEART CATH: CPT | Performed by: INTERNAL MEDICINE

## 2020-07-09 PROCEDURE — 93005 ELECTROCARDIOGRAM TRACING: CPT

## 2020-07-09 PROCEDURE — 99152 MOD SED SAME PHYS/QHP 5/>YRS: CPT | Performed by: INTERNAL MEDICINE

## 2020-07-09 PROCEDURE — 40000852 ZZH STATISTIC HEART CATH LAB OR EP LAB

## 2020-07-09 PROCEDURE — 93320 DOPPLER ECHO COMPLETE: CPT | Mod: 26 | Performed by: INTERNAL MEDICINE

## 2020-07-09 PROCEDURE — 93325 DOPPLER ECHO COLOR FLOW MAPG: CPT | Mod: 26 | Performed by: INTERNAL MEDICINE

## 2020-07-09 PROCEDURE — 93325 DOPPLER ECHO COLOR FLOW MAPG: CPT

## 2020-07-09 PROCEDURE — 93312 ECHO TRANSESOPHAGEAL: CPT | Mod: 26 | Performed by: INTERNAL MEDICINE

## 2020-07-09 PROCEDURE — 82803 BLOOD GASES ANY COMBINATION: CPT

## 2020-07-09 PROCEDURE — 85027 COMPLETE CBC AUTOMATED: CPT | Performed by: INTERNAL MEDICINE

## 2020-07-09 PROCEDURE — 40000065 ZZH STATISTIC EKG NON-CHARGEABLE

## 2020-07-09 PROCEDURE — 25000125 ZZHC RX 250: Performed by: INTERNAL MEDICINE

## 2020-07-09 PROCEDURE — 85610 PROTHROMBIN TIME: CPT | Performed by: INTERNAL MEDICINE

## 2020-07-09 PROCEDURE — 25000128 H RX IP 250 OP 636: Performed by: INTERNAL MEDICINE

## 2020-07-09 PROCEDURE — 27210794 ZZH OR GENERAL SUPPLY STERILE: Performed by: INTERNAL MEDICINE

## 2020-07-09 PROCEDURE — 36415 COLL VENOUS BLD VENIPUNCTURE: CPT | Performed by: INTERNAL MEDICINE

## 2020-07-09 PROCEDURE — 85730 THROMBOPLASTIN TIME PARTIAL: CPT | Performed by: INTERNAL MEDICINE

## 2020-07-09 PROCEDURE — 93010 ELECTROCARDIOGRAM REPORT: CPT | Performed by: INTERNAL MEDICINE

## 2020-07-09 PROCEDURE — 40000857 ZZH STATISTIC TEE INCLUDES SEDATION

## 2020-07-09 PROCEDURE — 25800030 ZZH RX IP 258 OP 636: Performed by: INTERNAL MEDICINE

## 2020-07-09 PROCEDURE — C1769 GUIDE WIRE: HCPCS | Performed by: INTERNAL MEDICINE

## 2020-07-09 PROCEDURE — 40000235 ZZH STATISTIC TELEMETRY

## 2020-07-09 PROCEDURE — 80048 BASIC METABOLIC PNL TOTAL CA: CPT | Performed by: INTERNAL MEDICINE

## 2020-07-09 RX ORDER — SODIUM CHLORIDE 9 MG/ML
INJECTION, SOLUTION INTRAVENOUS CONTINUOUS
Status: DISCONTINUED | OUTPATIENT
Start: 2020-07-09 | End: 2020-07-09 | Stop reason: HOSPADM

## 2020-07-09 RX ORDER — LIDOCAINE 50 MG/G
OINTMENT TOPICAL ONCE
Status: COMPLETED | OUTPATIENT
Start: 2020-07-09 | End: 2020-07-09

## 2020-07-09 RX ORDER — NALOXONE HYDROCHLORIDE 0.4 MG/ML
.1-.4 INJECTION, SOLUTION INTRAMUSCULAR; INTRAVENOUS; SUBCUTANEOUS
Status: DISCONTINUED | OUTPATIENT
Start: 2020-07-09 | End: 2020-07-09 | Stop reason: HOSPADM

## 2020-07-09 RX ORDER — GLYCOPYRROLATE 0.2 MG/ML
0.1 INJECTION, SOLUTION INTRAMUSCULAR; INTRAVENOUS ONCE
Status: COMPLETED | OUTPATIENT
Start: 2020-07-09 | End: 2020-07-09

## 2020-07-09 RX ORDER — LIDOCAINE 40 MG/G
CREAM TOPICAL
Status: DISCONTINUED | OUTPATIENT
Start: 2020-07-09 | End: 2020-07-09 | Stop reason: HOSPADM

## 2020-07-09 RX ORDER — FLUMAZENIL 0.1 MG/ML
0.2 INJECTION, SOLUTION INTRAVENOUS
Status: DISCONTINUED | OUTPATIENT
Start: 2020-07-09 | End: 2020-07-09 | Stop reason: HOSPADM

## 2020-07-09 RX ORDER — NALOXONE HYDROCHLORIDE 0.4 MG/ML
.2-.4 INJECTION, SOLUTION INTRAMUSCULAR; INTRAVENOUS; SUBCUTANEOUS
Status: DISCONTINUED | OUTPATIENT
Start: 2020-07-09 | End: 2020-07-09 | Stop reason: HOSPADM

## 2020-07-09 RX ORDER — FENTANYL CITRATE 50 UG/ML
25 INJECTION, SOLUTION INTRAMUSCULAR; INTRAVENOUS
Status: DISCONTINUED | OUTPATIENT
Start: 2020-07-09 | End: 2020-07-09 | Stop reason: HOSPADM

## 2020-07-09 RX ORDER — POTASSIUM CHLORIDE 1500 MG/1
20 TABLET, EXTENDED RELEASE ORAL
Status: DISCONTINUED | OUTPATIENT
Start: 2020-07-09 | End: 2020-07-09 | Stop reason: HOSPADM

## 2020-07-09 RX ORDER — ASPIRIN 81 MG/1
81 TABLET ORAL DAILY
Status: DISCONTINUED | OUTPATIENT
Start: 2020-07-09 | End: 2020-07-09 | Stop reason: HOSPADM

## 2020-07-09 RX ORDER — SODIUM CHLORIDE 9 MG/ML
INJECTION, SOLUTION INTRAVENOUS CONTINUOUS PRN
Status: DISCONTINUED | OUTPATIENT
Start: 2020-07-09 | End: 2020-07-09 | Stop reason: HOSPADM

## 2020-07-09 RX ORDER — ACETAMINOPHEN 325 MG/1
650 TABLET ORAL EVERY 4 HOURS PRN
Status: DISCONTINUED | OUTPATIENT
Start: 2020-07-09 | End: 2020-07-09 | Stop reason: HOSPADM

## 2020-07-09 RX ORDER — DEXTROSE MONOHYDRATE 25 G/50ML
9.5 INJECTION, SOLUTION INTRAVENOUS
Status: DISCONTINUED | OUTPATIENT
Start: 2020-07-09 | End: 2020-07-09 | Stop reason: HOSPADM

## 2020-07-09 RX ORDER — LIDOCAINE HYDROCHLORIDE 40 MG/ML
1.5 SOLUTION TOPICAL ONCE
Status: COMPLETED | OUTPATIENT
Start: 2020-07-09 | End: 2020-07-09

## 2020-07-09 RX ORDER — ATROPINE SULFATE 0.1 MG/ML
0.5 INJECTION INTRAVENOUS EVERY 5 MIN PRN
Status: DISCONTINUED | OUTPATIENT
Start: 2020-07-09 | End: 2020-07-09 | Stop reason: HOSPADM

## 2020-07-09 RX ADMIN — TOPICAL ANESTHETIC 0.5 ML: 200 SPRAY DENTAL; PERIODONTAL at 08:22

## 2020-07-09 RX ADMIN — FENTANYL CITRATE 50 MCG: 50 INJECTION, SOLUTION INTRAMUSCULAR; INTRAVENOUS at 08:27

## 2020-07-09 RX ADMIN — GLYCOPYRROLATE 0.1 MG: 0.2 INJECTION, SOLUTION INTRAMUSCULAR; INTRAVENOUS at 08:11

## 2020-07-09 RX ADMIN — SODIUM CHLORIDE: 9 INJECTION, SOLUTION INTRAVENOUS at 07:34

## 2020-07-09 RX ADMIN — LIDOCAINE HYDROCHLORIDE 1.5 ML: 40 SOLUTION TOPICAL at 08:10

## 2020-07-09 RX ADMIN — SODIUM CHLORIDE: 9 INJECTION, SOLUTION INTRAVENOUS at 09:15

## 2020-07-09 RX ADMIN — MIDAZOLAM 1 MG: 1 INJECTION INTRAMUSCULAR; INTRAVENOUS at 08:32

## 2020-07-09 RX ADMIN — FENTANYL CITRATE 25 MCG: 50 INJECTION, SOLUTION INTRAMUSCULAR; INTRAVENOUS at 08:32

## 2020-07-09 RX ADMIN — MIDAZOLAM 0.5 MG: 1 INJECTION INTRAMUSCULAR; INTRAVENOUS at 08:45

## 2020-07-09 RX ADMIN — MIDAZOLAM 1 MG: 1 INJECTION INTRAMUSCULAR; INTRAVENOUS at 08:27

## 2020-07-09 ASSESSMENT — MIFFLIN-ST. JEOR: SCORE: 2084

## 2020-07-09 NOTE — PRE-PROCEDURE
GENERAL PRE-PROCEDURE:   Procedure:  LUIS A  Date/Time:  7/9/2020 8:14 AM    Verbal consent obtained?: Yes    Written consent obtained?: Yes    Risks and benefits: Risks, benefits and alternatives were discussed    Consent given by:  Patient  Patient states understanding of procedure being performed: Yes    Patient's understanding of procedure matches consent: Yes    Procedure consent matches procedure scheduled: Yes    Expected level of sedation:  Moderate  Appropriately NPO:  Yes  ASA Class:  Class 2- mild systemic disease, no acute problems, no functional limitations  Mallampati  :  Grade 1- soft palate, uvula, tonsillar pillars, and posterior pharyngeal wall visible  Lungs:  Lungs clear with good breath sounds bilaterally  Heart:  Normal heart sounds and rate  History & Physical reviewed:  History and physical reviewed and no updates needed  Statement of review:  I have reviewed the lab findings, diagnostic data, medications, and the plan for sedation

## 2020-07-09 NOTE — PROCEDURES
[facility]    Procedure: *Transesophageal Echocardiogram    Date/Time: 7/9/2020 9:00 AM  Performed by: Carlo Patterson MD  Authorized by: Carlo Patterson MD     UNIVERSAL PROTOCOL   Site Marked: NA  Prior Images Obtained and Reviewed:  Yes  Required items: Required blood products, implants, devices and special equipment available    Patient identity confirmed:  Verbally with patient  Patient was reevaluated immediately before administering moderate or deep sedation or anesthesia  Confirmation Checklist:  Patient's identity using two indicators  Time out: Immediately prior to the procedure a time out was called    Universal Protocol: the [facility] Universal Protocol was followed          SEDATION    Patient Sedated: Yes    Sedation:  Fentanyl and midazolam  Vital signs: Vital signs monitored during sedation    PROCEDURE   Patient Tolerance:  Patient tolerated the procedure well with no immediate complications  Describe Procedure: LUIS A Note    Indication: RV dysfunction, hypoxemia    Informed consent was signed by the patient.  A timeout was taken.    Sedation:  Versed 2.5mg  Fentanyl 75mcg    Findings:  LV: EF 60%  RV: moderate dilation, moderate hypokinesis  LA: no LISA thrombus  Mitral valve: mild MR  Aortic valve: normal  Tricuspid valve: no TR, could not estimate RVSP  Atrial septum: negative bubble study, no color doppler shunt  Aorta: 3.8cm, mild atheroma  Other: pulmonary veins dilated, no anomalous connections, no source of shunting found    No complications.    Carlo Patterson MD  Cardiology - [facility]  Pager: [phone]  Text Page  July 9, 2020    Length of time physician/provider present for 1:1 monitoring during sedation: 20

## 2020-07-09 NOTE — PROGRESS NOTES
Care Suites Discharge Nursing Note    Patient Information  Name: Aiden Almaraz  Age: 64 year old    Discharge Education:  Discharge instructions reviewed: Yes  Additional education/resources provided: yes  Patient/patient representative verbalizes understanding: Yes  Patient discharging on new medications: No  Medication education completed: Yes    Discharge Plans:   Discharge location: home  Discharge ride contacted: Yes  Approximate discharge time: 1233    Discharge Criteria:  Discharge criteria met and vital signs stable: Yes    Patient Belongs:  Patient belongings returned to patient: Yes    Prabhjot Obrien RN

## 2020-07-09 NOTE — PRE-PROCEDURE
GENERAL PRE-PROCEDURE:   Procedure:  Right heart cath  Date/Time:  7/9/2020 11:06 AM    Verbal consent obtained?: Yes    Written consent obtained?: Yes    Risks and benefits: Risks, benefits and alternatives were discussed    Consent given by:  Patient  Patient states understanding of procedure being performed: Yes    Patient's understanding of procedure matches consent: Yes    Procedure consent matches procedure scheduled: Yes    Appropriately NPO:  Yes  ASA Class:  Class 2- mild systemic disease, no acute problems, no functional limitations  Mallampati  :  Grade 3- soft palate visible, posterior pharyngeal wall not visible  Lungs:  Lungs clear with good breath sounds bilaterally  Heart:  Normal heart sounds and rate  History & Physical reviewed:  History and physical reviewed and no updates needed  Statement of review:  I have reviewed the lab findings, diagnostic data, medications, and the plan for sedation

## 2020-07-09 NOTE — PRE-PROCEDURE
GENERAL PRE-PROCEDURE:   Procedure:  Right heart catheterization  Date/Time:  7/9/2020 8:24 AM    Verbal consent obtained?: Yes    Written consent obtained?: Yes    Risks and benefits: Risks, benefits and alternatives were discussed    Consent given by:  Patient  Patient states understanding of procedure being performed: Yes    Patient's understanding of procedure matches consent: Yes    Procedure consent matches procedure scheduled: Yes    Expected level of sedation:  Moderate  Appropriately NPO:  Yes  ASA Class:  Class 2- mild systemic disease, no acute problems, no functional limitations  Mallampati  :  Grade 2- soft palate, base of uvula, tonsillar pillars, and portion of posterior pharyngeal wall visible  Lungs:  Lungs clear with good breath sounds bilaterally  Heart:  Normal heart sounds and rate  History & Physical reviewed:  History and physical reviewed and no updates needed  Statement of review:  I have reviewed the lab findings, diagnostic data, medications, and the plan for sedation

## 2020-07-09 NOTE — PROGRESS NOTES
Care Suites Procedure Nursing Note    Patient Information  Name: Aiden Almaraz  Age: 64 year old    Procedure  Procedure: LUIS A  Procedure start time: 0825  Procedure complete time: 0854  Concerns/abnormal assessment: none  If abnormal assessment, provider notified: N/A  Plan/Other: After time out and consent obtained LUIS A performed, 2.5mg iv versed and 75mcg iv fentanyl given for sedation, VSS, pt tolerated well,Dr. Patterson spoke to wife regarding the results post procedure and pt continues to be NPO for heart cath at 1000 today. Report given to Monse Mantilla RN, pt transferred by cart to CS room 3.    Mandy Del Cid RN

## 2020-07-09 NOTE — PROGRESS NOTES
Care Suites Admission Nursing Note    Patient Information  Name: Aiden Almaraz  Age: 64 year old  Reason for admission: LUIS A heart cath  Care Suites arrival time: 0640    Patient Admission/Assessment   Pre-procedure assessment complete: Yes  If abnormal assessment/labs, provider notified: N/A  NPO: Yes  Medications held per instructions/orders: N/A  Consent: deferred  If applicable, pregnancy test status: deferred  Patient oriented to room: Yes  Education/questions answered: Yes  Plan/other:     Discharge Planning  Accompanied by: wife  Discharge name/phone number: leeanne [phone]  Overnight post sedation caregiver: Leeanne   Discharge location: home    Mandy Del Cid RN

## 2020-07-09 NOTE — DISCHARGE INSTRUCTIONS
Cardiac Angiogram Discharge Instructions - Neck    After you go home:      Have an adult stay with you until tomorrow.    Drink extra fluids for 2 days.    You may resume your normal diet.    No smoking       For 24 hours - due to the sedation you received:    Relax and take it easy.    Do NOT make any important or legal decisions.    Do NOT drive or operate machines at home or at work.    Do NOT drink alcohol.    Care of Neck  Puncture Sites:      For the first 24 hrs - check the puncture site every 1-2 hours while awake.    It is normal to have soreness at the puncture site and mild tingling in your hand for up to 3 days.    Remove the bandaid after 24 hours. If there is minor oozing, apply another bandaid and remove it after 12 hours.    You may shower tomorrow. Do NOT take a bath, or use a hot tub or pool for at least 3 days. Do NOT scrub the site. Do not use lotion or powder near the puncture site.           Activity - For 2 days:    Neck site:    Avoid heavy lifting or the overuse of your shoulder.        Bleeding:     Neck site:    If you start bleeding from the site in your neck, sit down and press gently on the site for 10 minutes.     Once bleeding stops, sit still for 2 hours.     Call [facility] as soon as you can    Call 911 right away if you have heavy bleeding or bleeding that does not stop.      Medicines:       If you are on Metformin (Glucophage), do not restart it until you have blood tests (within 2 to 3 days after discharge).  After you have your blood drawn, you may restart the Metformin.     Take your medications, including blood thinners, unless your provider tells you not to.      If you have stopped any medicines, check with your provider about when to restart them.    Follow Up Appointments:      Follow up with [facility] Nurse Practitioner at [facility] of patient preference in 7-10 days.    Call the clinic if:      You have increased pain or a large or growing hard lump around the  site.    The site is red, swollen, hot or tender.    Blood or fluid is draining from the site.    You have chills or a fever greater than 101 F (38 C).    Your arm feels numb, cool or changes color.    You have hives, a rash or unusual itching.    Any questions or concerns.      [facility]:    [phone] [facility] (7 days a week)    .LUIS A  (Transesophageal Echocardiogram)  Discharge Instructions    After you go home:      Have an adult stay with you for 6 hours.       Diet:    You may resume your normal diet, but no scratchy foods for two days.    If your throat is sore, eat cold, bland or soft foods.    You may have heartburn if the tube used in the exam entered your stomach.  If so:   - Do not eat acidic and spicy foods.   - Do not eat three hours before bedtime. Clear liquids are okay.   - When lying down, use two pillows to raise your head.    Medicines:      Take your medications, including blood thinners, unless your provider tells you not to.    If you have stopped any medicines, check with your provider about when to restart them.    You may take Tylenol (Acetaminophen) if your throat is sore.    You may take antacids if you have heartburn.      Follow Up Appointments:      Follow up with your cardiologist at [facility] of patient preference as instructed.    Follow up with your primary care provider as needed.    Call the clinic if:      You have heartburn that is severe or lasts more than 72 hours.    You have a sore throat that feels worse after 72 hours.    You have shortness of breath, neck pain, chest pain, fever, chills, coughing up blood, or other unusual signs.    Questions or concerns      [facility]:    [phone] [facility] (7 days a week)

## 2020-07-09 NOTE — PROGRESS NOTES
PATIENT/VISITOR WELLNESS SCREENING    Step 1 Patient Screening    1. In the last month, have you been in contact with someone who was confirmed or suspected to have Coronavirus/COVID-19? No    2. Do you have the following symptoms?  Fever/Chills? No   Cough? No   Shortness of breath? No   New loss of taste or smell? No  Sore throat? No  Muscle or body aches? No  Headaches? No  Fatigue? No  Vomiting or diarrhea? No    Step 2 Visitor Screening    1. Name of Visitor (1 visitor per patient): yasir wife    2. In the last month, have you been in contact with someone who was confirmed or suspected to have Coronavirus/COVID-19? No    3. Do you have the following symptoms?  Fever/Chills? No   Cough? No   Shortness of breath? No   Skin rash? No   Loss of taste or smell? No  Sore throat? No  Runny or stuffy nose? No  Muscle or body aches? No  Headaches? No  Fatigue? No  Vomiting or diarrhea? No    If the visitor has positive symptoms, notify supervisor/manger  Per policy, the visitor will need to leave the facility     Step 3 Refer to logic grid below for actions    NO SYMPTOM(S)    ACTIONS:  1. Standard rooming process  2. Provider to assess per normal protocol  3. Implement precautions as needed and per guidelines     POSITIVE SYMPTOM(S)  If positive for ANY of the following symptoms: fever, cough, shortness of breath, rash    ACTION:  1. Continue to have the patient wear a mask   2. Room patient as soon as possible  3. Don appropriate PPE when entering room  4. Provider evaluation

## 2020-07-11 LAB — INTERPRETATION ECG - MUSE: NORMAL

## 2020-07-12 ENCOUNTER — COMMUNICATION - HEALTHEAST (OUTPATIENT)
Dept: FAMILY MEDICINE | Facility: CLINIC | Age: 65
End: 2020-07-12

## 2020-07-23 ENCOUNTER — TELEPHONE (OUTPATIENT)
Dept: CARDIOLOGY | Facility: CLINIC | Age: 65
End: 2020-07-23

## 2020-07-23 NOTE — TELEPHONE ENCOUNTER
Pt has OV with NP 7/24/20 asking DR richard will forward to DR Mckeon.   Component      Latest Ref Rng & Units 7/9/2020   pH Arterial      7.35 - 7.45 pH 7.34 (L)   pCO2 Arterial      35 - 45 mm Hg 46 (H)   PO2 Arterial      80 - 105 mm Hg 36 (LL)   Bicarbonate Arterial      21 - 28 mmol/L 25   O2 Sat Arterial      92 - 100 % 65 (L)   RAEANN Quevedo RN

## 2020-07-23 NOTE — TELEPHONE ENCOUNTER
No need to do this now  I will be talking to him directly but would have been helpful if you would have sent when they came through--I took it out of his inbox  thanks

## 2020-07-23 NOTE — TELEPHONE ENCOUNTER
And GIDOEN-- after further review they probably were sent from [facility] and may be PA numbers so no need to send as he and I have discussed

## 2020-07-24 ENCOUNTER — VIRTUAL VISIT (OUTPATIENT)
Dept: CARDIOLOGY | Facility: CLINIC | Age: 65
End: 2020-07-24
Attending: INTERNAL MEDICINE
Payer: MEDICARE

## 2020-07-24 ENCOUNTER — DOCUMENTATION ONLY (OUTPATIENT)
Dept: CARDIOLOGY | Facility: CLINIC | Age: 65
End: 2020-07-24

## 2020-07-24 DIAGNOSIS — R09.02 HYPOXIA: ICD-10-CM

## 2020-07-24 DIAGNOSIS — I50.30 DIASTOLIC HEART FAILURE, UNSPECIFIED HF CHRONICITY (H): Primary | ICD-10-CM

## 2020-07-24 DIAGNOSIS — I27.20 PULMONARY HYPERTENSION (H): ICD-10-CM

## 2020-07-24 DIAGNOSIS — I26.09 ACUTE COR PULMONALE (H): ICD-10-CM

## 2020-07-24 DIAGNOSIS — I25.10 CORONARY ARTERY DISEASE INVOLVING NATIVE CORONARY ARTERY OF NATIVE HEART WITHOUT ANGINA PECTORIS: Primary | ICD-10-CM

## 2020-07-24 PROCEDURE — 99215 OFFICE O/P EST HI 40 MIN: CPT | Mod: 95 | Performed by: NURSE PRACTITIONER

## 2020-07-24 RX ORDER — TORSEMIDE 20 MG/1
20 TABLET ORAL DAILY
Qty: 90 TABLET | Refills: 3 | Status: SHIPPED | OUTPATIENT
Start: 2020-07-24 | End: 2020-09-15

## 2020-07-24 RX ORDER — POTASSIUM CHLORIDE 750 MG/1
10 TABLET, EXTENDED RELEASE ORAL DAILY
Qty: 90 TABLET | Refills: 3 | Status: SHIPPED | OUTPATIENT
Start: 2020-07-24 | End: 2020-08-06

## 2020-07-24 NOTE — PROGRESS NOTES
Can you call and check on him end of next week or with week of 8-4    I put him on torsemide 20mg daily and told him to weigh daily and limit salt    He is having labs week of 8-4    I am seeing him face to face 8-14 at [facility]    thanks

## 2020-07-24 NOTE — PATIENT INSTRUCTIONS
Stop furosemide  Start torsemide 20mg daily with potassium 10meq daily  I will have scheduling call you to set up nonfasting labs the week of 8-4 at our [place] Office.  I will have my nurse check in with you in 7-10 days to see how you are doing  Weigh yourself each am after you get up and empty your bladder so we can track how much fluid your losing  -limit salt; do not add salt to your food    -See me at our [place] Office 8-14 face to face at 0930am  For questions call my nurse Will at [phone]

## 2020-07-24 NOTE — LETTER
7/24/2020    Calvin SHRESTHA Giselle  [facility] [address] [id number]    RE: Aiden Almaraz       Dear Colleague,    I had the pleasure of seeing Aiden Almaraz in the [facility].    Aiden Almaraz is a 64 year old male who is being evaluated via a billable video visit.      Video-Visit Details      Aiden Almaraz is a 64-year-old gentleman who is returning for a video visit today following a right heart cath after a visit with Dr. Mckeon.  We saw him in 2016 for atypical chest pain.  Nuclear stress testing was abnormal likely due to body habitus.  A left heart cath was done showing a 35% LAD lesion with a normal ejection fraction.  He had mild aortic root enlargement at that point.    He has been seen by Dr. No from pulmonary and also has gone to [facility].  He wears a CPAP for his sleep apnea.  He has had a concussion in the past which has left him with some memory deficits and dizziness.    On recent note he was complaining of chest discomfort, palpitations, fatigue, and shortness of breath.  He reported to Dr. Mckeon that his resting O2 sat at his physician's office was 86%.  With deep breathing improved to 92%.  Previous pulmonary function test showed mild restrictive lung disease.  He reported to Dr. Salguero that the lung clinic at [place] told him his lungs looked fine with no concerning issues.  Has I reviewed the pulmonary records at [facility] his blood tests were unremarkable.  A sniff test was normal.  Pulmonary function testing demonstrated normal spirometry and DLCO. TSH was borderline high but T4 so far was normal.    Due to palpitations and the above complaints he underwent an MRI stress test which was unremarkable for ischemia.  Echocardiogram with bubble study was ordered but difficult because of body habitus.  There was perhaps some indication of basal inferolateral wall hypokinesis.  They reported the right ventricle as moderately dilated  with decrease in function.  The aorta measured 40 mm at the base and 42 mm in the ascending portion.  The bubble study was suboptimal.    Based on this Dr. Mckeon sent the patient for transesophageal echocardiogram to rule out anomalous pulmonary venous return shunt and a right heart cath was done to assess for pulmonary hypertension.    I have reviewed the test results with the patient.  His transesophageal echocardiogram notes LV function at 60%.  The RV is moderately dilated and reduced in function.  There is no atrial shunt on bubble study and the ascending aorta is mildly dilated at 3.8 cm.  All 4 pulmonary veins appear dilated with normal velocity and flow.    Right heart cath was also performed:  Pulmonary artery systolic pressures 43/23 with a mean of 29.  Right atrial pressure is 16.  Pulmonary wedge pressure was between 20 and 23 consistent with elevated filling pressures consistent likely with diastolic dysfunction.  Diuresis was recommended    As it turns out this patient has been on furosemide for a number of years but never put it on his med list in epic.  This was prescribed by his primary care doctor through care everywhere.  He is on 20 mg of furosemide daily.  Blood pressures controlled in the 128 range on carvedilol.    He has normal renal function with a potassium of 3.8.  He had salt to his food.  He is actually up approximately 15 pounds since we saw him in 2016 and complains of bilateral lower extremity edema with dyspnea.    I had a long discussion today instructing him that he needs to start weighing himself each morning to track his weights.  We talked at length about limiting the sodium in his diet.  We talked about the need to continue his CPAP monitoring.    Weight 2016 273;now 287    General Appearance:    no distress, morbidly obese, upright.    ENT/Mouth:    membranes moist, no nasal discharge or bleeding gums. Normal head shape, no evidence of injury or laceration.    EYES:    no  scleral icterus, normal conjunctivae    Neck:    no evidence of thyromegaly. Supple    Chest/Lungs:    No audible wheezing equal chest wall expansion. Non labored breathing. No cough.    Cardiovascular:    No evidence of elevated jugular venous pressure. No evidence of pitting edema bilaterally    Abdomen:    no evidence of abdominal distention. No observed jaundice.    Extremities:    no cyanosis or clubbing noted. He reports bilateral lower extremity edema.    Skin:    no xanthelasma, normal skin collar. No evidence of facial lacerations.    Neurologic:    Normal arm motion bilateral, no tremors. No evidence of focal defect.    Psychiatric:    alert and oriented x3, calm    Impression/Plan:    1. CAD with lad 35% and RCA 10% on angiogram in 2016  Recent CMR stress test negative for ischemia    2. Preserved LVEF    3. Hypoxia with diastolic heart failure with pulmonary hypertension and elevated wedge  -RV dysfunction  -stop furosemide 20mg daily  -start torsemide 20mg daily with KCL 10meq daily  -daily weight  -stop adding salt to fooe  -elevate legs  -labs week of 8-4 at our [facility]  -see me 8-14 at [facility]  -I will have my nurse contact him in 7-10 days to get an update on his weight    4. HTN  Controlled on coreg  -consider adding vasodilators depending on BP at follow up    5. No intracardiac shunt  Negative bubble study    6. Dilated aorta-mild  Continue to monitor by echo  Continue beta-blockers    7. Sleep apnea  Continue CPAP    8. Lipids were last checked in 2016  LDL 62 HDL 34   On colestid; no statin  Will discuss lipid panel next visit or have it done when he get BMP at [facility] week of 8-4    9. 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      Thank you for allowing me to participate in the care of your patient.    Sincerely,     CHRISTIANE Andrew [facility]

## 2020-07-24 NOTE — PROGRESS NOTES
"Aiden Almaraz is a 64 year old male who is being evaluated via a billable video visit.      The patient has been notified of following:     \"This video visit will be conducted via a call between you and your physician/provider. We have found that certain health care needs can be provided without the need for an in-person physical exam.  This service lets us provide the care you need with a video conversation.  If a prescription is necessary we can send it directly to your pharmacy.  If lab work is needed we can place an order for that and you can then stop by our lab to have the test done at a later time.    Video visits are billed at different rates depending on your insurance coverage.  Please reach out to your insurance provider with any questions.    If during the course of the call the physician/provider feels a video visit is not appropriate, you will not be charged for this service.\"    Patient has given verbal consent for Video visit? Yes  How would you like to obtain your AVS? Mail a copy  If you are dropped from the video visit, the video invite should be resent to: Text to cell phone: [phone]  Will anyone else be joining your video visit? No       Review Of Systems  Skin: Positive for thin skin - skin breaks open easily; purple pigmentation on legs  Eyes: Positive for glasses  Ears/Nose/Throat: Negative  Respiratory: Positive for dyspnea with exertion/at rest; sleep apnea - wears a CPAP  Cardiovascular: Positive for occ palpitations, chest pressure, lightheadedness, edema - wears compression stockings, fatigue  Gastrointestinal: Positive for reflux; hx gallbladder removed  Genitourinary: Positive for \"leakage\"; nocturia  Musculoskeletal: Positive for joint pain, nocturnal cramping  Neurologic: Positive for headaches, concussion  Psychiatric: Positive for stress  Hematologic/Lymphatic/Immunologic: Negative  Endocrine: Negative    Patient reported vitals:  BP: 126/81  Heart rate: 57  Weight: 287 " rosa Huggins [facility]      Video-Visit Details      Aiden Almaraz is a 64-year-old gentleman who is returning for a video visit today following a right heart cath after a visit with Dr. Mckeon.  We saw him in 2016 for atypical chest pain.  Nuclear stress testing was abnormal likely due to body habitus.  A left heart cath was done showing a 35% LAD lesion with a normal ejection fraction.  He had mild aortic root enlargement at that point.    He has been seen by Dr. No from pulmonary and also has gone to [facility].  He wears a CPAP for his sleep apnea.  He has had a concussion in the past which has left him with some memory deficits and dizziness.    On recent note he was complaining of chest discomfort, palpitations, fatigue, and shortness of breath.  He reported to Dr. Mckeon that his resting O2 sat at his physician's office was 86%.  With deep breathing improved to 92%.  Previous pulmonary function test showed mild restrictive lung disease.  He reported to Dr. Mckeon that the lung clinic at [place] told him his lungs looked fine with no concerning issues.  I reviewed the pulmonary records at [facility] his blood tests were unremarkable.  A sniff test was normal.  Pulmonary function testing demonstrated normal spirometry and DLCO. TSH was borderline high but T4 so far was normal.    Due to palpitations and the above complaints he underwent an MRI stress test which was unremarkable for ischemia.  Echocardiogram with bubble study was ordered but difficult because of body habitus.  There was perhaps some indication of basal inferolateral wall hypokinesis.  They reported the right ventricle as moderately dilated with decrease in function.  The aorta measured 40 mm at the base and 42 mm in the ascending portion.  The bubble study was suboptimal.    Based on this Dr. Mckeon sent the patient for transesophageal echocardiogram to rule out anomalous pulmonary venous return shunt and a right heart cath was done to  assess for pulmonary hypertension.    I have reviewed the test results with the patient.  His transesophageal echocardiogram notes LV function at 60%.  The RV is moderately dilated and reduced in function.  There is no atrial shunt on bubble study and the ascending aorta is mildly dilated at 3.8 cm.  All 4 pulmonary veins appear dilated with normal velocity and flow.    Right heart cath was also performed:  Pulmonary artery systolic pressures 43/23 with a mean of 29.  Right atrial pressure is 16.  Pulmonary wedge pressure was between 20 and 23 consistent with elevated filling pressures consistent likely with diastolic dysfunction.  Diuresis was recommended    As it turns out this patient has been on furosemide for a number of years but never put it on his med list in epic.  This was prescribed by his primary care doctor through care everywhere.  He is on 20 mg of furosemide daily.  Blood pressures controlled in the 128 range on carvedilol.    He has normal renal function with a potassium of 3.8.  He adds salt to his food.  He is actually up approximately 15 pounds since we saw him in 2016 and complains of bilateral lower extremity edema with dyspnea.    I had a long discussion today instructing him that he needs to start weighing himself each morning to track his weights.  We talked at length about limiting the sodium in his diet.  We talked about the need to continue his CPAP monitoring.    Weight 2016 273;now 287    General Appearance:    no distress, morbidly obese, upright.    ENT/Mouth:    membranes moist, no nasal discharge or bleeding gums. Normal head shape, no evidence of injury or laceration.    EYES:    no scleral icterus, normal conjunctivae    Neck:    no evidence of thyromegaly. Supple    Chest/Lungs:    No audible wheezing equal chest wall expansion. Non labored breathing. No cough.    Cardiovascular:    No evidence of elevated jugular venous pressure. No evidence of pitting edema  bilaterally    Abdomen:    no evidence of abdominal distention. No observed jaundice.    Extremities:    no cyanosis or clubbing noted. He reports bilateral lower extremity edema.    Skin:    no xanthelasma, normal skin collar. No evidence of facial lacerations.    Neurologic:    Normal arm motion bilateral, no tremors. No evidence of focal defect.    Psychiatric:    alert and oriented x3, calm    Impression/Plan:    1. CAD with lad 35% and RCA 10% on angiogram in 2016  Recent CMR stress test negative for ischemia    2. Preserved LVEF    3. Hypoxia with diastolic heart failure with pulmonary hypertension and elevated wedge  -RV dysfunction  -stop furosemide 20mg daily  -start torsemide 20mg daily with KCL 10meq daily  -daily weight  -stop adding salt to food  -elevate legs  -labs week of 8-4 at our [facility]  -see me 8-14 at [facility]  -I will have my nurse contact him in 7-10 days to get an update on his weight    4. HTN  Controlled on coreg  -consider adding vasodilators depending on BP at follow up    5. No intracardiac shunt  Negative bubble study    6. Dilated aorta-mild  Continue to monitor by echo  Continue beta-blockers    7. Sleep apnea  Continue CPAP    8. Lipids were last checked in 2016  LDL 62 HDL 34   On colestid with Lipitor 10mg daily   Will discuss lipid panel next visit or have it done when he get BMP at [facility] week of 8-4    9. 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    Addendum 8-6: LDL 80 HDL 30 -will increase lipitor to 20mg daily   K 3.4 increase potassium to 20meq daily  Will have nursing call  Type of service:  Video Visit    Video Start Time: 320pm  Video End Time: 406pm    Originating Location (pt. Location): Home    Distant Location (provider location):  home office    Platform used for Video Visit: CHRISTIANE Baca CNP

## 2020-07-24 NOTE — PROGRESS NOTES
Can you call him and let him know that he has not had cholesterol checked since 2016    He is coming in for labs on 8-4 to [facility] so can you tell him to fast and add lipids    Order in and I added to appointment note    He may need to change time now that he is fasting and another encounter was sent to you earlier on him too     thanks

## 2020-07-27 NOTE — PROGRESS NOTES
Will address with pt when calling for update on 7/31/20 as planned.    ISABELL Guzmán 3:43 PM 7/27/2020

## 2020-07-31 NOTE — PROGRESS NOTES
Called pt, no answer, LVM requesting return call with update on wt/sx since 7/24/20 with taking torsmide 20mg daily.   Will also discuss need to change lab appt time on 8/4 due to PATIENCE Paris adding fasting cholesterol lab orders.     ISABELL Guzmán 2:00 PM 7/31/2020

## 2020-08-03 ENCOUNTER — TELEPHONE (OUTPATIENT)
Dept: CARDIOLOGY | Facility: CLINIC | Age: 65
End: 2020-08-03

## 2020-08-03 NOTE — PROGRESS NOTES
Received call from pt. He reports he got a new scale shortly after his visit with Alejandra MORIN on 7/24/20 (home wt 287# that afternoon with clothes and shoes on) and wts have been:  7/28  277#  7/29  275#  7/30  276#  7/31  275#  8/3    278#    Pt reports he has been watching his sodium intake closely and does not understand why wt went back up over the weekend. Pt reports his legs do look less swollen and feel less tight than they did on 7/24.     Pt confirms he will keep lab appt tomorrow and fast rather than switch to a different day for AM appt.     Discussed with pt that PTAIENCE Paris will review labs done tomorrow and will then callback with her recommendations.     ISABELL Guzmán 4:35 PM 8/3/2020

## 2020-08-04 DIAGNOSIS — I25.10 CORONARY ARTERY DISEASE INVOLVING NATIVE CORONARY ARTERY OF NATIVE HEART WITHOUT ANGINA PECTORIS: ICD-10-CM

## 2020-08-04 DIAGNOSIS — I26.09 ACUTE COR PULMONALE (H): ICD-10-CM

## 2020-08-04 LAB
ANION GAP SERPL CALCULATED.3IONS-SCNC: 6 MMOL/L (ref 3–14)
BUN SERPL-MCNC: 26 MG/DL (ref 7–30)
CALCIUM SERPL-MCNC: 8.6 MG/DL (ref 8.5–10.1)
CHLORIDE SERPL-SCNC: 106 MMOL/L (ref 94–109)
CHOLEST SERPL-MCNC: 165 MG/DL
CO2 SERPL-SCNC: 28 MMOL/L (ref 20–32)
CREAT SERPL-MCNC: 1.16 MG/DL (ref 0.66–1.25)
GFR SERPL CREATININE-BSD FRML MDRD: 65 ML/MIN/{1.73_M2}
GLUCOSE SERPL-MCNC: 109 MG/DL (ref 70–99)
HDLC SERPL-MCNC: 30 MG/DL
LDLC SERPL CALC-MCNC: 80 MG/DL
NONHDLC SERPL-MCNC: 135 MG/DL
POTASSIUM SERPL-SCNC: 3.4 MMOL/L (ref 3.4–5.3)
SODIUM SERPL-SCNC: 140 MMOL/L (ref 133–144)
TRIGL SERPL-MCNC: 277 MG/DL

## 2020-08-04 PROCEDURE — 80061 LIPID PANEL: CPT | Performed by: NURSE PRACTITIONER

## 2020-08-04 PROCEDURE — 36415 COLL VENOUS BLD VENIPUNCTURE: CPT | Performed by: NURSE PRACTITIONER

## 2020-08-04 PROCEDURE — 80048 BASIC METABOLIC PNL TOTAL CA: CPT | Performed by: NURSE PRACTITIONER

## 2020-08-06 ENCOUNTER — CARE COORDINATION (OUTPATIENT)
Dept: CARDIOLOGY | Facility: CLINIC | Age: 65
End: 2020-08-06

## 2020-08-06 DIAGNOSIS — R09.02 HYPOXIA: ICD-10-CM

## 2020-08-06 DIAGNOSIS — E78.5 HYPERLIPIDEMIA: Primary | ICD-10-CM

## 2020-08-06 RX ORDER — POTASSIUM CHLORIDE 750 MG/1
20 TABLET, EXTENDED RELEASE ORAL DAILY
Qty: 180 TABLET | Refills: 3 | Status: SHIPPED | OUTPATIENT
Start: 2020-08-06 | End: 2020-09-02

## 2020-08-06 NOTE — PROGRESS NOTES
Called and spoke with pt.  Discussed that PATIENCE Paris reviewed labs done 8/4/20. Potassium is borderline low and she recommends he increase KCL from 10mEq to 20mEq daily.  Updated Rx sent to [facility] in [place].     Also discussed that his LDL cholesterol is 80 and should be closer to 70 so recommendation is to increase Lipitor from 10mg to 20mg daily.  Pt reports he does not take Lipitor currently and has not taken any cholesterol medication for at least a few years.     Discussed with pt that will update his Epic med list, let PATIENCE Paris know this and callback with her recommendations.    ISABELL Guzmán 4:02 PM 8/6/2020

## 2020-08-07 NOTE — PROGRESS NOTES
Called pt, no answer, LVM requesting return call to review recommendations from PATIENCE Paris.    ISABELL Guzmán 9:24 AM 8/7/2020

## 2020-08-10 RX ORDER — ATORVASTATIN CALCIUM 10 MG/1
10 TABLET, FILM COATED ORAL DAILY
Qty: 90 TABLET | Refills: 3 | Status: SHIPPED | OUTPATIENT
Start: 2020-08-10 | End: 2020-08-14

## 2020-08-10 NOTE — PROGRESS NOTES
Received return call from pt.  Reviewed recommenations to restart Lipitor 10mg daily and recheck lipids/alt in 2-3 months.  Pt verbalized understanding and requested 90-day supply Lipitor Rx with refills be sent to [facility] in [place].  Rx sent as requested.     ISABELL Guzmán 3:01 PM 8/10/2020

## 2020-08-13 ENCOUNTER — TELEPHONE (OUTPATIENT)
Dept: CARDIOLOGY | Facility: CLINIC | Age: 65
End: 2020-08-13

## 2020-08-13 NOTE — TELEPHONE ENCOUNTER

## 2020-08-13 NOTE — PROGRESS NOTES
History of Present Illness:    Aiden Almaraz is a 64-year-old gentleman who is returning for follow up on changed from furosemide to torsemide.    He recently underwent a right heart cath after a visit with Dr. Mckeon.      We saw him in 2016 for atypical chest pain.  Nuclear stress testing was abnormal likely due to body habitus.  A left heart cath was done showing a 35% LAD lesion with a normal ejection fraction.  He had mild aortic root enlargement at that point.     He has been seen by Dr. No from pulmonary and also has gone to [facility].  He wears a CPAP for his sleep apnea.  He has had a concussion in the past which has left him with some memory deficits and dizziness.     Recently, he was complaining of chest discomfort, palpitations, fatigue, and shortness of breath.  He reported to Dr. Mckeon that his resting O2 sat at his physician's office was 86%.  With deep breathing improved to 92%.  Previous pulmonary function test showed mild restrictive lung disease.  He reported to Dr. Salguero that the lung clinic at [place] told him his lungs looked fine with no concerning issues. I reviewed the pulmonary records from [facility]. His blood tests were unremarkable.  A sniff test was normal.  Pulmonary function testing demonstrated normal spirometry and DLCO. TSH was borderline high but T4 so far was normal.     Due to palpitations and the above complaints he underwent an MRI stress test which was unremarkable for ischemia.  Echocardiogram with bubble study was ordered but difficult because of body habitus.  There was perhaps some indication of basal inferolateral wall hypokinesis.  They reported the right ventricle as moderately dilated with decrease in function.  The aorta measured 40 mm at the base and 42 mm in the ascending portion.  The bubble study was suboptimal.     Based on this Dr. Mckeon sent the patient for transesophageal echocardiogram to rule out anomalous pulmonary venous return shunt and a  right heart cath was done to assess for pulmonary hypertension.       His transesophageal echocardiogram notes LV function at 60%.  The RV is moderately dilated and reduced in function.  There is no atrial shunt on bubble study and the ascending aorta is mildly dilated at 3.8 cm.  All 4 pulmonary veins appear dilated with normal velocity and flow.     Right heart cath:  Pulmonary artery systolic pressures 43/23 with a mean of 29.  Right atrial pressure is 16.  Pulmonary wedge pressure was between 20 and 23 consistent with elevated filling pressures consistent likely with diastolic dysfunction.  Diuresis was recommended so I switched his lasix to torsemide.      He adds salt to his food which we talked about last visit as well as the need for daily weights.  He is actually up approximately 15 pounds since we saw him in 2016 and complains of bilateral lower extremity edema with dyspnea.      We talked about the need to continue his CPAP monitoring.       Weight in 2016 273  -recently 287 at home on 7- and down to 278 as of 8-3 at home; 276 here    BMP on torsemide: sodium 140 potassium 3.4 BUN 26 Creatinine 1.16  potassium was increased following this to 20mEq per day.  Lipids on Lipitor 10mg daily: LDL 80 HDL 30    He takes colestid prn for loose stools and infrequently      Exam: trace edema on right; 1+ on left with venous stasis changes and healed ulceration Left anterior calf. Lungs clear; abdomen obese        Impression/Plan:     1. CAD with lad 35% and RCA 10% on angiogram in 2016  Recent CMR stress test negative for ischemia     2. Preserved LVEF     3. Hypoxia with diastolic heart failure/pulmonary hypertension and elevated wedge  -RV dysfunction  -torsemide 20mg daily with KCL 20meq daily  -daily weights  -he has stopped adding salt to foods; continue a low sodium diet  -elevate legs/support hose  -add Lisinopril 5mg daily  -BMP 2 weeks      4. HTN  Controlled on coreg  -add lisinopril as  outlined above     5. No intracardiac shunt  Negative bubble study     6. Dilated aorta-mild 4.2cm  Continue to monitor by echo  Continue beta-blockers     7. Sleep apnea  Continue CPAP     8. Lipids were last checked in 2016  LDL now 80 TG still high at 277  Increase Lipitor to 20mg daily  He takes Colestid prn for loose stools not for cholesterol    Will need lipid panel in 2 months    9. 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     10. Lower extremity venous disease  -I have sent him for venous competency study  -support hose  Elevate  Refer to vein clinic pending ultrasound results    Greater than 50% of this 45 minute visit was spent in counseling    It has been a pleasure seeing Aiden Almaraz in follow up    Micaela Mendoza, MSN, APRN-BC, CNP  Cardiology    Orders Placed This Encounter   Procedures     US Venous Competency Bilateral     Basic metabolic panel     Follow-Up with Cardiac Advanced Practice Provider     Orders Placed This Encounter   Medications     lisinopril (ZESTRIL) 5 MG tablet     Sig: Take 1 tablet (5 mg) by mouth daily     Dispense:  90 tablet     Refill:  3     atorvastatin (LIPITOR) 20 MG tablet     Sig: Take 1 tablet (20 mg) by mouth daily     Dispense:  90 tablet     Refill:  3     Medications Discontinued During This Encounter   Medication Reason     loratadine (CLARITIN) 10 MG tablet      atorvastatin (LIPITOR) 10 MG tablet Reorder         Encounter Diagnoses   Name Primary?     Localized edema      Coronary artery disease involving native coronary artery of native heart without angina pectoris Yes       CURRENT MEDICATIONS:  Current Outpatient Medications   Medication Sig Dispense Refill     acetaminophen (TYLENOL) 500 MG tablet Take 500-1,000 mg by mouth every 6 hours as needed for mild pain       albuterol (PROAIR HFA, PROVENTIL HFA, VENTOLIN HFA) 108 (90 BASE) MCG/ACT inhaler Inhale 2 puffs into the lungs every 4 hours as needed for shortness of breath / dyspnea or  wheezing       aspirin 81 MG tablet Take 1 tablet (81 mg) by mouth daily 30 tablet      atorvastatin (LIPITOR) 20 MG tablet Take 1 tablet (20 mg) by mouth daily 90 tablet 3     carvedilol (COREG) 6.25 MG tablet Take 1 tablet (6.25 mg) by mouth 2 times daily (with meals) 180 tablet 3     celecoxib (CELEBREX) 200 MG capsule Take 200 mg by mouth daily       cetirizine (ZYRTEC) 10 MG tablet Take 10 mg by mouth daily       colestipol (COLESTID) 1 G tablet Take 2 g by mouth 2 times daily Takes as needed for loose stools       Fluticasone Propionate (FLONASE NA) Spray 1 spray in nostril as needed       lisinopril (ZESTRIL) 5 MG tablet Take 1 tablet (5 mg) by mouth daily 90 tablet 3     meclizine 25 MG CHEW Take 25 mg by mouth every 6 hours as needed for dizziness       potassium chloride ER (KLOR-CON M) 10 MEQ CR tablet Take 2 tablets (20 mEq) by mouth daily 180 tablet 3     sertraline (ZOLOFT) 100 MG tablet Take 100 mg by mouth daily       sertraline (ZOLOFT) 50 MG tablet Take 50 mg by mouth daily       tamsulosin (FLOMAX) 0.4 MG capsule Take 0.4 mg by mouth daily       torsemide (DEMADEX) 20 MG tablet Take 1 tablet (20 mg) by mouth daily 90 tablet 3     vitamin D3 (CHOLECALCIFEROL) 95174 UNITS capsule Take 1,000 Units by mouth 2 times daily          ALLERGIES     Allergies   Allergen Reactions     Seasonal Allergies        PAST MEDICAL HISTORY:  Past Medical History:   Diagnosis Date     Corey's esophagus      CAD (coronary artery disease) 02/2016    mild, nonobstructive per cath 2016     Concussion 2016    fall went to ER--now with dizzy and memory issue     Degenerative disc disease, lumbar      Depression with anxiety      Gastroesophageal reflux disease 1999    lap nissen at [facility] june 1999     Hyperlipidemia      IBS (irritable bowel syndrome)     on colestid for diarrhea not cholesterol     Impaired glucose tolerance      Lichen simplex chronicus      Metabolic syndrome      Mild ascending aorta  dilation (H)     40mm     Osteomyelitis of right leg (H) 1979     Restrictive lung disease     Dr No, mild restriction PFT     Sleep apnea     cpap       PAST SURGICAL HISTORY:  Past Surgical History:   Procedure Laterality Date     CARPAL TUNNEL RELEASE RT/LT Bilateral      CHOLECYSTECTOMY       CV RIGHT HEART CATH Right 7/9/2020    Procedure: Right Heart Cath WITH FULL OXIMETRY;  Surgeon: Boaz Bustillo MD;  Location:  HEART CARDIAC CATH LAB     KNEE SURGERY      arthroscopic (pt doesn't recall which knee)     NISSEN FUNDOPLICATION       ROTATOR CUFF REPAIR RT/LT Right        FAMILY HISTORY:  Family History   Problem Relation Age of Onset     Other Cancer Mother      Hypertension Mother      Colon Cancer Father      Asthma Father        SOCIAL HISTORY:  Social History     Socioeconomic History     Marital status:      Spouse name: None     Number of children: None     Years of education: None     Highest education level: None   Occupational History     Occupation:      Comment: service food machines   Social Needs     Financial resource strain: None     Food insecurity     Worry: None     Inability: None     Transportation needs     Medical: None     Non-medical: None   Tobacco Use     Smoking status: Never Smoker     Smokeless tobacco: Never Used   Substance and Sexual Activity     Alcohol use: Yes     Alcohol/week: 0.0 standard drinks     Comment: occ     Drug use: None     Sexual activity: None   Lifestyle     Physical activity     Days per week: None     Minutes per session: None     Stress: None   Relationships     Social connections     Talks on phone: None     Gets together: None     Attends Anabaptist service: None     Active member of club or organization: None     Attends meetings of clubs or organizations: None     Relationship status: None     Intimate partner violence     Fear of current or ex partner: None     Emotionally abused: None     Physically abused: None  "    Forced sexual activity: None   Other Topics Concern     Parent/sibling w/ CABG, MI or angioplasty before 65F 55M? Not Asked      Service Not Asked     Blood Transfusions Not Asked     Caffeine Concern No     Comment: occ     Occupational Exposure Not Asked     Hobby Hazards Not Asked     Sleep Concern Not Asked     Stress Concern Not Asked     Weight Concern Not Asked     Special Diet No     Back Care Not Asked     Exercise No     Bike Helmet Not Asked     Seat Belt Not Asked     Self-Exams Not Asked   Social History Narrative     None       Review of Systems:  Skin:  Negative       Eyes:  Positive for glasses    ENT:  Negative      Respiratory:  Positive for shortness of breath;sleep apnea;CPAP;dyspnea on exertion     Cardiovascular:    chest pain;Positive for;edema;lightheadedness    Gastroenterology: Negative      Genitourinary:  Positive for prostate problem    Musculoskeletal:  Positive for arthritis;neck pain;back pain    Neurologic:  Negative      Psychiatric:  Negative      Heme/Lymph/Imm:  Positive for allergies    Endocrine:  Negative        Physical Exam:  Vitals: /80 (BP Location: Right arm, Patient Position: Sitting, Cuff Size: Adult Regular)   Pulse 66   Ht 1.778 m (5' 10\")   Wt 125.3 kg (276 lb 3.2 oz)   SpO2 95%   BMI 39.63 kg/m      Constitutional:  cooperative, alert and oriented, well developed, well nourished, in no acute distress        Skin:  warm and dry to the touch, no apparent skin lesions or masses noted          Head:  normocephalic, no masses or lesions        Eyes:           Lymph:      ENT:  no pallor or cyanosis, dentition good        Neck:           Respiratory:  normal breath sounds, clear to auscultation, normal A-P diameter, normal symmetry, normal respiratory excursion, no use of accessory muscles         Cardiac: regular rhythm, normal S1/S2, no S3 or S4, apical impulse not displaced, no murmurs, gallops or rubs                pulses full and equal, no " bruits auscultated                                   RFA cath site is normal    GI:  abdomen soft, non-tender, BS normoactive, no mass, no HSM, no bruits obese difficult exam with body habitus    Extremities and Muscular Skeletal:  no deformities, clubbing, cyanosis, erythema observed     trace;telangiectasia;varicose vein telangiectasia;varicose vein;pitting;1+ venous stasis changes--healed sores on left leg ??venous stasis ulcers    Neurological:           Psych:         Recent Lab Results:  LIPID RESULTS:  Lab Results   Component Value Date    CHOL 165 08/04/2020    HDL 30 (L) 08/04/2020    LDL 80 08/04/2020    TRIG 277 (H) 08/04/2020       LIVER ENZYME RESULTS:  Lab Results   Component Value Date    ALT 33 01/26/2016       CBC RESULTS:  Lab Results   Component Value Date    WBC 6.9 07/09/2020    RBC 4.58 07/09/2020    HGB 14.2 07/09/2020    HCT 42.9 07/09/2020    MCV 94 07/09/2020    MCH 31.0 07/09/2020    MCHC 33.1 07/09/2020    RDW 13.7 07/09/2020     (L) 07/09/2020       BMP RESULTS:  Lab Results   Component Value Date     08/04/2020    POTASSIUM 3.4 08/04/2020    CHLORIDE 106 08/04/2020    CO2 28 08/04/2020    ANIONGAP 6 08/04/2020     (H) 08/04/2020    BUN 26 08/04/2020    CR 1.16 08/04/2020    GFRESTIMATED 65 08/04/2020    GFRESTBLACK 76 08/04/2020    MARGA 8.6 08/04/2020        A1C RESULTS:  Lab Results   Component Value Date    A1C 5.6 01/26/2016       INR RESULTS:  Lab Results   Component Value Date    INR 1.09 07/09/2020    INR 1.09 02/01/2016           CC  No referring provider defined for this encounter.

## 2020-08-14 ENCOUNTER — HOSPITAL ENCOUNTER (OUTPATIENT)
Dept: CARDIOLOGY | Facility: CLINIC | Age: 65
Discharge: HOME | End: 2020-08-14
Attending: NURSE PRACTITIONER | Admitting: NURSE PRACTITIONER
Payer: MEDICARE

## 2020-08-14 ENCOUNTER — OFFICE VISIT (OUTPATIENT)
Dept: CARDIOLOGY | Facility: CLINIC | Age: 65
End: 2020-08-14
Payer: MEDICARE

## 2020-08-14 VITALS
BODY MASS INDEX: 39.54 KG/M2 | SYSTOLIC BLOOD PRESSURE: 128 MMHG | HEIGHT: 70 IN | OXYGEN SATURATION: 95 % | WEIGHT: 276.2 LBS | DIASTOLIC BLOOD PRESSURE: 80 MMHG | HEART RATE: 66 BPM

## 2020-08-14 DIAGNOSIS — R60.0 LOCALIZED EDEMA: ICD-10-CM

## 2020-08-14 DIAGNOSIS — I25.10 CORONARY ARTERY DISEASE INVOLVING NATIVE CORONARY ARTERY OF NATIVE HEART WITHOUT ANGINA PECTORIS: ICD-10-CM

## 2020-08-14 DIAGNOSIS — I50.30 DIASTOLIC HEART FAILURE, UNSPECIFIED HF CHRONICITY (H): Primary | ICD-10-CM

## 2020-08-14 PROCEDURE — 93970 EXTREMITY STUDY: CPT | Mod: 26 | Performed by: INTERNAL MEDICINE

## 2020-08-14 PROCEDURE — 93970 EXTREMITY STUDY: CPT

## 2020-08-14 PROCEDURE — 99215 OFFICE O/P EST HI 40 MIN: CPT | Mod: 25 | Performed by: NURSE PRACTITIONER

## 2020-08-14 RX ORDER — LISINOPRIL 5 MG/1
5 TABLET ORAL DAILY
Qty: 90 TABLET | Refills: 3 | Status: SHIPPED | OUTPATIENT
Start: 2020-08-14 | End: 2021-06-21

## 2020-08-14 RX ORDER — ATORVASTATIN CALCIUM 20 MG/1
20 TABLET, FILM COATED ORAL DAILY
Qty: 90 TABLET | Refills: 3 | Status: SHIPPED | OUTPATIENT
Start: 2020-08-14

## 2020-08-14 ASSESSMENT — MIFFLIN-ST. JEOR: SCORE: 2044.08

## 2020-08-14 NOTE — LETTER
8/14/2020    Calvin Desai  Formerly [facility] [address] [address]    RE: Aiden Almaraz       Dear Colleague,    I had the pleasure of seeing Aiden Almaraz in the [facility].    History of Present Illness:    Aiden Almaraz is a 64-year-old gentleman who is returning for follow up on changed from furosemide to torsemide.    He recently underwent a right heart cath after a visit with Dr. Mckeon.      We saw him in 2016 for atypical chest pain.  Nuclear stress testing was abnormal likely due to body habitus.  A left heart cath was done showing a 35% LAD lesion with a normal ejection fraction.  He had mild aortic root enlargement at that point.     He has been seen by Dr. No from pulmonary and also has gone to [facility].  He wears a CPAP for his sleep apnea.  He has had a concussion in the past which has left him with some memory deficits and dizziness.     Recently, he was complaining of chest discomfort, palpitations, fatigue, and shortness of breath.  He reported to Dr. Mckeon that his resting O2 sat at his physician's office was 86%.  With deep breathing improved to 92%.  Previous pulmonary function test showed mild restrictive lung disease.  He reported to Dr. Salguero that the lung clinic at [place] told him his lungs looked fine with no concerning issues. I reviewed the pulmonary records from [facility]. His blood tests were unremarkable.  A sniff test was normal.  Pulmonary function testing demonstrated normal spirometry and DLCO. TSH was borderline high but T4 so far was normal.     Due to palpitations and the above complaints he underwent an MRI stress test which was unremarkable for ischemia.  Echocardiogram with bubble study was ordered but difficult because of body habitus.  There was perhaps some indication of basal inferolateral wall hypokinesis.  They reported the right ventricle as moderately dilated with decrease in function.  The aorta  measured 40 mm at the base and 42 mm in the ascending portion.  The bubble study was suboptimal.     Based on this Dr. Mckeon sent the patient for transesophageal echocardiogram to rule out anomalous pulmonary venous return shunt and a right heart cath was done to assess for pulmonary hypertension.       His transesophageal echocardiogram notes LV function at 60%.  The RV is moderately dilated and reduced in function.  There is no atrial shunt on bubble study and the ascending aorta is mildly dilated at 3.8 cm.  All 4 pulmonary veins appear dilated with normal velocity and flow.     Right heart cath:  Pulmonary artery systolic pressures 43/23 with a mean of 29.  Right atrial pressure is 16.  Pulmonary wedge pressure was between 20 and 23 consistent with elevated filling pressures consistent likely with diastolic dysfunction.  Diuresis was recommended so I switched his lasix to torsemide.      He adds salt to his food which we talked about last visit as well as the need for daily weights.  He is actually up approximately 15 pounds since we saw him in 2016 and complains of bilateral lower extremity edema with dyspnea.      We talked about the need to continue his CPAP monitoring.       Weight in 2016 273  -recently 287 at home on 7- and down to 278 as of 8-3 at home; 276 here    BMP on torsemide: sodium 140 potassium 3.4 BUN 26 Creatinine 1.16  potassium was increased following this to 20mEq per day.  Lipids on Lipitor 10mg daily: LDL 80 HDL 30    He takes colestid prn for loose stools and infrequently      Exam: trace edema on right; 1+ on left with venous stasis changes and healed ulceration Left anterior calf. Lungs clear; abdomen obese        Impression/Plan:     1. CAD with lad 35% and RCA 10% on angiogram in 2016  Recent CMR stress test negative for ischemia     2. Preserved LVEF     3. Hypoxia with diastolic heart failure/pulmonary hypertension and elevated wedge  -RV dysfunction  -torsemide 20mg  daily with KCL 20meq daily  -daily weights  -he has stopped adding salt to foods; continue a low sodium diet  -elevate legs/support hose  -add Lisinopril 5mg daily  -BMP 2 weeks      4. HTN  Controlled on coreg  -add lisinopril as outlined above     5. No intracardiac shunt  Negative bubble study     6. Dilated aorta-mild 4.2cm  Continue to monitor by echo  Continue beta-blockers     7. Sleep apnea  Continue CPAP     8. Lipids were last checked in 2016  LDL now 80 TG still high at 277  Increase Lipitor to 20mg daily  He takes Colestid prn for loose stools not for cholesterol    Will need lipid panel in 2 months    9. 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     10. Lower extremity venous disease  -I have sent him for venous competency study  -support hose  Elevate  Refer to vein clinic pending ultrasound results    Greater than 50% of this 45 minute visit was spent in counseling    It has been a pleasure seeing Aiden Almaraz in follow up    Micaela Mendoza, MSN, APRN-BC, CNP  Cardiology    Orders Placed This Encounter   Procedures     US Venous Competency Bilateral     Basic metabolic panel     Follow-Up with Cardiac Advanced Practice Provider     Orders Placed This Encounter   Medications     lisinopril (ZESTRIL) 5 MG tablet     Sig: Take 1 tablet (5 mg) by mouth daily     Dispense:  90 tablet     Refill:  3     atorvastatin (LIPITOR) 20 MG tablet     Sig: Take 1 tablet (20 mg) by mouth daily     Dispense:  90 tablet     Refill:  3     Medications Discontinued During This Encounter   Medication Reason     loratadine (CLARITIN) 10 MG tablet      atorvastatin (LIPITOR) 10 MG tablet Reorder         Encounter Diagnoses   Name Primary?     Localized edema      Coronary artery disease involving native coronary artery of native heart without angina pectoris Yes       CURRENT MEDICATIONS:  Current Outpatient Medications   Medication Sig Dispense Refill     acetaminophen (TYLENOL) 500 MG tablet Take 500-1,000 mg  by mouth every 6 hours as needed for mild pain       albuterol (PROAIR HFA, PROVENTIL HFA, VENTOLIN HFA) 108 (90 BASE) MCG/ACT inhaler Inhale 2 puffs into the lungs every 4 hours as needed for shortness of breath / dyspnea or wheezing       aspirin 81 MG tablet Take 1 tablet (81 mg) by mouth daily 30 tablet      atorvastatin (LIPITOR) 20 MG tablet Take 1 tablet (20 mg) by mouth daily 90 tablet 3     carvedilol (COREG) 6.25 MG tablet Take 1 tablet (6.25 mg) by mouth 2 times daily (with meals) 180 tablet 3     celecoxib (CELEBREX) 200 MG capsule Take 200 mg by mouth daily       cetirizine (ZYRTEC) 10 MG tablet Take 10 mg by mouth daily       colestipol (COLESTID) 1 G tablet Take 2 g by mouth 2 times daily Takes as needed for loose stools       Fluticasone Propionate (FLONASE NA) Spray 1 spray in nostril as needed       lisinopril (ZESTRIL) 5 MG tablet Take 1 tablet (5 mg) by mouth daily 90 tablet 3     meclizine 25 MG CHEW Take 25 mg by mouth every 6 hours as needed for dizziness       potassium chloride ER (KLOR-CON M) 10 MEQ CR tablet Take 2 tablets (20 mEq) by mouth daily 180 tablet 3     sertraline (ZOLOFT) 100 MG tablet Take 100 mg by mouth daily       sertraline (ZOLOFT) 50 MG tablet Take 50 mg by mouth daily       tamsulosin (FLOMAX) 0.4 MG capsule Take 0.4 mg by mouth daily       torsemide (DEMADEX) 20 MG tablet Take 1 tablet (20 mg) by mouth daily 90 tablet 3     vitamin D3 (CHOLECALCIFEROL) 73060 UNITS capsule Take 1,000 Units by mouth 2 times daily          ALLERGIES     Allergies   Allergen Reactions     Seasonal Allergies        PAST MEDICAL HISTORY:  Past Medical History:   Diagnosis Date     Corey's esophagus      CAD (coronary artery disease) 02/2016    mild, nonobstructive per cath 2016     Concussion 2016    fall went to ER--now with dizzy and memory issue     Degenerative disc disease, lumbar      Depression with anxiety      Gastroesophageal reflux disease 1999    lap nissen at [facility] j[facility]     Hyperlipidemia      IBS (irritable bowel syndrome)     on colestid for diarrhea not cholesterol     Impaired glucose tolerance      Lichen simplex chronicus      Metabolic syndrome      Mild ascending aorta dilation (H)     40mm     Osteomyelitis of right leg (H) 1979     Restrictive lung disease     Dr No, mild restriction PFT     Sleep apnea     cpap       PAST SURGICAL HISTORY:  Past Surgical History:   Procedure Laterality Date     CARPAL TUNNEL RELEASE RT/LT Bilateral      CHOLECYSTECTOMY       CV RIGHT HEART CATH Right 7/9/2020    Procedure: Right Heart Cath WITH FULL OXIMETRY;  Surgeon: Boaz Bustillo MD;  Location:  HEART CARDIAC CATH LAB     KNEE SURGERY      arthroscopic (pt doesn't recall which knee)     NISSEN FUNDOPLICATION       ROTATOR CUFF REPAIR RT/LT Right        FAMILY HISTORY:  Family History   Problem Relation Age of Onset     Other Cancer Mother      Hypertension Mother      Colon Cancer Father      Asthma Father        SOCIAL HISTORY:  Social History     Socioeconomic History     Marital status:      Spouse name: None     Number of children: None     Years of education: None     Highest education level: None   Occupational History     Occupation:      Comment: service food machines   Social Needs     Financial resource strain: None     Food insecurity     Worry: None     Inability: None     Transportation needs     Medical: None     Non-medical: None   Tobacco Use     Smoking status: Never Smoker     Smokeless tobacco: Never Used   Substance and Sexual Activity     Alcohol use: Yes     Alcohol/week: 0.0 standard drinks     Comment: occ     Drug use: None     Sexual activity: None   Lifestyle     Physical activity     Days per week: None     Minutes per session: None     Stress: None   Relationships     Social connections     Talks on phone: None     Gets together: None     Attends Nondenominational service: None     Active member of  "club or organization: None     Attends meetings of clubs or organizations: None     Relationship status: None     Intimate partner violence     Fear of current or ex partner: None     Emotionally abused: None     Physically abused: None     Forced sexual activity: None   Other Topics Concern     Parent/sibling w/ CABG, MI or angioplasty before 65F 55M? Not Asked      Service Not Asked     Blood Transfusions Not Asked     Caffeine Concern No     Comment: occ     Occupational Exposure Not Asked     Hobby Hazards Not Asked     Sleep Concern Not Asked     Stress Concern Not Asked     Weight Concern Not Asked     Special Diet No     Back Care Not Asked     Exercise No     Bike Helmet Not Asked     Seat Belt Not Asked     Self-Exams Not Asked   Social History Narrative     None       Review of Systems:  Skin:  Negative       Eyes:  Positive for glasses    ENT:  Negative      Respiratory:  Positive for shortness of breath;sleep apnea;CPAP;dyspnea on exertion     Cardiovascular:    chest pain;Positive for;edema;lightheadedness    Gastroenterology: Negative      Genitourinary:  Positive for prostate problem    Musculoskeletal:  Positive for arthritis;neck pain;back pain    Neurologic:  Negative      Psychiatric:  Negative      Heme/Lymph/Imm:  Positive for allergies    Endocrine:  Negative        Physical Exam:  Vitals: /80 (BP Location: Right arm, Patient Position: Sitting, Cuff Size: Adult Regular)   Pulse 66   Ht 1.778 m (5' 10\")   Wt 125.3 kg (276 lb 3.2 oz)   SpO2 95%   BMI 39.63 kg/m      Constitutional:  cooperative, alert and oriented, well developed, well nourished, in no acute distress        Skin:  warm and dry to the touch, no apparent skin lesions or masses noted          Head:  normocephalic, no masses or lesions        Eyes:           Lymph:      ENT:  no pallor or cyanosis, dentition good        Neck:           Respiratory:  normal breath sounds, clear to auscultation, normal A-P diameter, " normal symmetry, normal respiratory excursion, no use of accessory muscles         Cardiac: regular rhythm, normal S1/S2, no S3 or S4, apical impulse not displaced, no murmurs, gallops or rubs                pulses full and equal, no bruits auscultated                                   RFA cath site is normal    GI:  abdomen soft, non-tender, BS normoactive, no mass, no HSM, no bruits obese difficult exam with body habitus    Extremities and Muscular Skeletal:  no deformities, clubbing, cyanosis, erythema observed     trace;telangiectasia;varicose vein telangiectasia;varicose vein;pitting;1+ venous stasis changes--healed sores on left leg ??venous stasis ulcers    Neurological:           Psych:         Recent Lab Results:  LIPID RESULTS:  Lab Results   Component Value Date    CHOL 165 08/04/2020    HDL 30 (L) 08/04/2020    LDL 80 08/04/2020    TRIG 277 (H) 08/04/2020       LIVER ENZYME RESULTS:  Lab Results   Component Value Date    ALT 33 01/26/2016       CBC RESULTS:  Lab Results   Component Value Date    WBC 6.9 07/09/2020    RBC 4.58 07/09/2020    HGB 14.2 07/09/2020    HCT 42.9 07/09/2020    MCV 94 07/09/2020    MCH 31.0 07/09/2020    MCHC 33.1 07/09/2020    RDW 13.7 07/09/2020     (L) 07/09/2020       BMP RESULTS:  Lab Results   Component Value Date     08/04/2020    POTASSIUM 3.4 08/04/2020    CHLORIDE 106 08/04/2020    CO2 28 08/04/2020    ANIONGAP 6 08/04/2020     (H) 08/04/2020    BUN 26 08/04/2020    CR 1.16 08/04/2020    GFRESTIMATED 65 08/04/2020    GFRESTBLACK 76 08/04/2020    MARGA 8.6 08/04/2020        A1C RESULTS:  Lab Results   Component Value Date    A1C 5.6 01/26/2016       INR RESULTS:  Lab Results   Component Value Date    INR 1.09 07/09/2020    INR 1.09 02/01/2016         Thank you for allowing me to participate in the care of your patient.    Sincerely,     CHRISTIANE Andrew [facility]

## 2020-08-14 NOTE — PATIENT INSTRUCTIONS
Increase Lipitor to 20mg daily    Add Lisinopril 5mg daily to dilate your vessels    Check labs(nonfasting) in 2 weeks to check kidneys and have an ultrasound of your lower extremities    See me back 9-18 for a visit  You will need a cholesterol in 2 months  Continue to weigh yourself

## 2020-08-14 NOTE — LETTER
8/14/2020    Calvin Desai  [facility] [address] [address]    RE: Aiden Almaraz       Dear Colleague,    I had the pleasure of seeing Aiden Almaraz in the [facility].    History of Present Illness:    Aiden Almaraz is a 64-year-old gentleman who is returning for follow up on changed from furosemide to torsemide.    He recently underwent a right heart cath after a visit with Dr. Mckeon.      We saw him in 2016 for atypical chest pain.  Nuclear stress testing was abnormal likely due to body habitus.  A left heart cath was done showing a 35% LAD lesion with a normal ejection fraction.  He had mild aortic root enlargement at that point.     He has been seen by Dr. No from pulmonary and also has gone to [facility].  He wears a CPAP for his sleep apnea.  He has had a concussion in the past which has left him with some memory deficits and dizziness.     Recently, he was complaining of chest discomfort, palpitations, fatigue, and shortness of breath.  He reported to Dr. Mckeon that his resting O2 sat at his physician's office was 86%.  With deep breathing improved to 92%.  Previous pulmonary function test showed mild restrictive lung disease.  He reported to Dr. Salguero that the lung clinic at [place] told him his lungs looked fine with no concerning issues. I reviewed the pulmonary records from [facility]. His blood tests were unremarkable.  A sniff test was normal.  Pulmonary function testing demonstrated normal spirometry and DLCO. TSH was borderline high but T4 so far was normal.     Due to palpitations and the above complaints he underwent an MRI stress test which was unremarkable for ischemia.  Echocardiogram with bubble study was ordered but difficult because of body habitus.  There was perhaps some indication of basal inferolateral wall hypokinesis.  They reported the right ventricle as moderately dilated with decrease in function.  The aorta  measured 40 mm at the base and 42 mm in the ascending portion.  The bubble study was suboptimal.     Based on this Dr. Mckeon sent the patient for transesophageal echocardiogram to rule out anomalous pulmonary venous return shunt and a right heart cath was done to assess for pulmonary hypertension.       His transesophageal echocardiogram notes LV function at 60%.  The RV is moderately dilated and reduced in function.  There is no atrial shunt on bubble study and the ascending aorta is mildly dilated at 3.8 cm.  All 4 pulmonary veins appear dilated with normal velocity and flow.     Right heart cath:  Pulmonary artery systolic pressures 43/23 with a mean of 29.  Right atrial pressure is 16.  Pulmonary wedge pressure was between 20 and 23 consistent with elevated filling pressures consistent likely with diastolic dysfunction.  Diuresis was recommended so I switched his lasix to torsemide.      He adds salt to his food which we talked about last visit as well as the need for daily weights.  He is actually up approximately 15 pounds since we saw him in 2016 and complains of bilateral lower extremity edema with dyspnea.      We talked about the need to continue his CPAP monitoring.       Weight in 2016 273  -recently 287 at home on 7- and down to 278 as of 8-3 at home; 276 here    BMP on torsemide: sodium 140 potassium 3.4 BUN 26 Creatinine 1.16  potassium was increased following this to 20mEq per day.  Lipids on Lipitor 10mg daily: LDL 80 HDL 30    He takes colestid prn for loose stools and infrequently      Exam: trace edema on right; 1+ on left with venous stasis changes and healed ulceration Left anterior calf. Lungs clear; abdomen obese        Impression/Plan:     1. CAD with lad 35% and RCA 10% on angiogram in 2016  Recent CMR stress test negative for ischemia     2. Preserved LVEF     3. Hypoxia with diastolic heart failure/pulmonary hypertension and elevated wedge  -RV dysfunction  -torsemide 20mg  daily with KCL 20meq daily  -daily weights  -he has stopped adding salt to foods; continue a low sodium diet  -elevate legs/support hose  -add Lisinopril 5mg daily  -BMP 2 weeks      4. HTN  Controlled on coreg  -add lisinopril as outlined above     5. No intracardiac shunt  Negative bubble study     6. Dilated aorta-mild 4.2cm  Continue to monitor by echo  Continue beta-blockers     7. Sleep apnea  Continue CPAP     8. Lipids were last checked in 2016  LDL now 80 TG still high at 277  Increase Lipitor to 20mg daily  He takes Colestid prn for loose stools not for cholesterol    Will need lipid panel in 2 months    9. 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     10. Lower extremity venous disease  -I have sent him for venous competency study  -support hose  Elevate  Refer to vein clinic pending ultrasound results    Greater than 50% of this 45 minute visit was spent in counseling    It has been a pleasure seeing Aiden Almaraz in follow up    Micaela Mendoza, MSN, APRN-BC, CNP  Cardiology    Orders Placed This Encounter   Procedures     US Venous Competency Bilateral     Basic metabolic panel     Follow-Up with Cardiac Advanced Practice Provider     Orders Placed This Encounter   Medications     lisinopril (ZESTRIL) 5 MG tablet     Sig: Take 1 tablet (5 mg) by mouth daily     Dispense:  90 tablet     Refill:  3     atorvastatin (LIPITOR) 20 MG tablet     Sig: Take 1 tablet (20 mg) by mouth daily     Dispense:  90 tablet     Refill:  3     Medications Discontinued During This Encounter   Medication Reason     loratadine (CLARITIN) 10 MG tablet      atorvastatin (LIPITOR) 10 MG tablet Reorder         Encounter Diagnoses   Name Primary?     Localized edema      Coronary artery disease involving native coronary artery of native heart without angina pectoris Yes       CURRENT MEDICATIONS:  Current Outpatient Medications   Medication Sig Dispense Refill     acetaminophen (TYLENOL) 500 MG tablet Take 500-1,000 mg  by mouth every 6 hours as needed for mild pain       albuterol (PROAIR HFA, PROVENTIL HFA, VENTOLIN HFA) 108 (90 BASE) MCG/ACT inhaler Inhale 2 puffs into the lungs every 4 hours as needed for shortness of breath / dyspnea or wheezing       aspirin 81 MG tablet Take 1 tablet (81 mg) by mouth daily 30 tablet      atorvastatin (LIPITOR) 20 MG tablet Take 1 tablet (20 mg) by mouth daily 90 tablet 3     carvedilol (COREG) 6.25 MG tablet Take 1 tablet (6.25 mg) by mouth 2 times daily (with meals) 180 tablet 3     celecoxib (CELEBREX) 200 MG capsule Take 200 mg by mouth daily       cetirizine (ZYRTEC) 10 MG tablet Take 10 mg by mouth daily       colestipol (COLESTID) 1 G tablet Take 2 g by mouth 2 times daily Takes as needed for loose stools       Fluticasone Propionate (FLONASE NA) Spray 1 spray in nostril as needed       lisinopril (ZESTRIL) 5 MG tablet Take 1 tablet (5 mg) by mouth daily 90 tablet 3     meclizine 25 MG CHEW Take 25 mg by mouth every 6 hours as needed for dizziness       potassium chloride ER (KLOR-CON M) 10 MEQ CR tablet Take 2 tablets (20 mEq) by mouth daily 180 tablet 3     sertraline (ZOLOFT) 100 MG tablet Take 100 mg by mouth daily       sertraline (ZOLOFT) 50 MG tablet Take 50 mg by mouth daily       tamsulosin (FLOMAX) 0.4 MG capsule Take 0.4 mg by mouth daily       torsemide (DEMADEX) 20 MG tablet Take 1 tablet (20 mg) by mouth daily 90 tablet 3     vitamin D3 (CHOLECALCIFEROL) 78136 UNITS capsule Take 1,000 Units by mouth 2 times daily          ALLERGIES     Allergies   Allergen Reactions     Seasonal Allergies        PAST MEDICAL HISTORY:  Past Medical History:   Diagnosis Date     Corey's esophagus      CAD (coronary artery disease) 02/2016    mild, nonobstructive per cath 2016     Concussion 2016    fall went to ER--now with dizzy and memory issue     Degenerative disc disease, lumbar      Depression with anxiety      Gastroesophageal reflux disease 1999    lap nissen at [facility] june 1999     Hyperlipidemia      IBS (irritable bowel syndrome)     on colestid for diarrhea not cholesterol     Impaired glucose tolerance      Lichen simplex chronicus      Metabolic syndrome      Mild ascending aorta dilation (H)     40mm     Osteomyelitis of right leg (H) 1979     Restrictive lung disease     Dr No, mild restriction PFT     Sleep apnea     cpap       PAST SURGICAL HISTORY:  Past Surgical History:   Procedure Laterality Date     CARPAL TUNNEL RELEASE RT/LT Bilateral      CHOLECYSTECTOMY       CV RIGHT HEART CATH Right 7/9/2020    Procedure: Right Heart Cath WITH FULL OXIMETRY;  Surgeon: Boaz Bustillo MD;  Location:  HEART CARDIAC CATH LAB     KNEE SURGERY      arthroscopic (pt doesn't recall which knee)     NISSEN FUNDOPLICATION       ROTATOR CUFF REPAIR RT/LT Right        FAMILY HISTORY:  Family History   Problem Relation Age of Onset     Other Cancer Mother      Hypertension Mother      Colon Cancer Father      Asthma Father        SOCIAL HISTORY:  Social History     Socioeconomic History     Marital status:      Spouse name: None     Number of children: None     Years of education: None     Highest education level: None   Occupational History     Occupation:      Comment: service food machines   Social Needs     Financial resource strain: None     Food insecurity     Worry: None     Inability: None     Transportation needs     Medical: None     Non-medical: None   Tobacco Use     Smoking status: Never Smoker     Smokeless tobacco: Never Used   Substance and Sexual Activity     Alcohol use: Yes     Alcohol/week: 0.0 standard drinks     Comment: occ     Drug use: None     Sexual activity: None   Lifestyle     Physical activity     Days per week: None     Minutes per session: None     Stress: None   Relationships     Social connections     Talks on phone: None     Gets together: None     Attends Muslim service: None     Active member of  "club or organization: None     Attends meetings of clubs or organizations: None     Relationship status: None     Intimate partner violence     Fear of current or ex partner: None     Emotionally abused: None     Physically abused: None     Forced sexual activity: None   Other Topics Concern     Parent/sibling w/ CABG, MI or angioplasty before 65F 55M? Not Asked      Service Not Asked     Blood Transfusions Not Asked     Caffeine Concern No     Comment: occ     Occupational Exposure Not Asked     Hobby Hazards Not Asked     Sleep Concern Not Asked     Stress Concern Not Asked     Weight Concern Not Asked     Special Diet No     Back Care Not Asked     Exercise No     Bike Helmet Not Asked     Seat Belt Not Asked     Self-Exams Not Asked   Social History Narrative     None       Review of Systems:  Skin:  Negative       Eyes:  Positive for glasses    ENT:  Negative      Respiratory:  Positive for shortness of breath;sleep apnea;CPAP;dyspnea on exertion     Cardiovascular:    chest pain;Positive for;edema;lightheadedness    Gastroenterology: Negative      Genitourinary:  Positive for prostate problem    Musculoskeletal:  Positive for arthritis;neck pain;back pain    Neurologic:  Negative      Psychiatric:  Negative      Heme/Lymph/Imm:  Positive for allergies    Endocrine:  Negative        Physical Exam:  Vitals: /80 (BP Location: Right arm, Patient Position: Sitting, Cuff Size: Adult Regular)   Pulse 66   Ht 1.778 m (5' 10\")   Wt 125.3 kg (276 lb 3.2 oz)   SpO2 95%   BMI 39.63 kg/m      Constitutional:  cooperative, alert and oriented, well developed, well nourished, in no acute distress        Skin:  warm and dry to the touch, no apparent skin lesions or masses noted          Head:  normocephalic, no masses or lesions        Eyes:           Lymph:      ENT:  no pallor or cyanosis, dentition good        Neck:           Respiratory:  normal breath sounds, clear to auscultation, normal A-P diameter, " normal symmetry, normal respiratory excursion, no use of accessory muscles         Cardiac: regular rhythm, normal S1/S2, no S3 or S4, apical impulse not displaced, no murmurs, gallops or rubs                pulses full and equal, no bruits auscultated                                   RFA cath site is normal    GI:  abdomen soft, non-tender, BS normoactive, no mass, no HSM, no bruits obese difficult exam with body habitus    Extremities and Muscular Skeletal:  no deformities, clubbing, cyanosis, erythema observed     trace;telangiectasia;varicose vein telangiectasia;varicose vein;pitting;1+ venous stasis changes--healed sores on left leg ??venous stasis ulcers    Neurological:           Psych:         Recent Lab Results:  LIPID RESULTS:  Lab Results   Component Value Date    CHOL 165 08/04/2020    HDL 30 (L) 08/04/2020    LDL 80 08/04/2020    TRIG 277 (H) 08/04/2020       LIVER ENZYME RESULTS:  Lab Results   Component Value Date    ALT 33 01/26/2016       CBC RESULTS:  Lab Results   Component Value Date    WBC 6.9 07/09/2020    RBC 4.58 07/09/2020    HGB 14.2 07/09/2020    HCT 42.9 07/09/2020    MCV 94 07/09/2020    MCH 31.0 07/09/2020    MCHC 33.1 07/09/2020    RDW 13.7 07/09/2020     (L) 07/09/2020       BMP RESULTS:  Lab Results   Component Value Date     08/04/2020    POTASSIUM 3.4 08/04/2020    CHLORIDE 106 08/04/2020    CO2 28 08/04/2020    ANIONGAP 6 08/04/2020     (H) 08/04/2020    BUN 26 08/04/2020    CR 1.16 08/04/2020    GFRESTIMATED 65 08/04/2020    GFRESTBLACK 76 08/04/2020    MARGA 8.6 08/04/2020        A1C RESULTS:  Lab Results   Component Value Date    A1C 5.6 01/26/2016       INR RESULTS:  Lab Results   Component Value Date    INR 1.09 07/09/2020    INR 1.09 02/01/2016           CC  No referring provider defined for this encounter.                  Thank you for allowing me to participate in the care of your patient.      Sincerely,     Micaela Mendoza, APRN CNP      [facility]    cc:   No referring provider defined for this encounter.

## 2020-08-18 ENCOUNTER — DOCUMENTATION ONLY (OUTPATIENT)
Dept: CARDIOLOGY | Facility: CLINIC | Age: 65
End: 2020-08-18

## 2020-08-18 DIAGNOSIS — R60.9 EDEMA, UNSPECIFIED TYPE: Primary | ICD-10-CM

## 2020-08-18 NOTE — PROGRESS NOTES
Saw him recently as we added diuretics after right heart cath confirmed high wedge likely diastolic dysfunction    He is responding to the diuretic but has varicosities and so I sent him for ultrasound as he has had ulcerations (now healed on his left leg)  Looks like his left leg has some issues with reflux on the left leg  I wanted to send him to vein clinic if you are ok with that?    Venous Competency Diagnostic Criteria Adopted 11.     Venous competency criteria defining abnormal reflux duration:  Femoral - popliteal reflux > 1.0 sec.  Deep femoral, deep calf veins, and superficial vein reflux > 0.5 sec.   vein reflux > 0.35 sec.     Supporting document: J Vasc Surg 2003; 38:793-8. Definition of reflux  in lower extremity veins.     Findings:                                                                       Impression:     1. Right lea. No deep or superficial vein thrombus.  1b. Deep vein reflux of the common femoral vein.  1c. No superficial vein reflux.  1d. No incompetent perforators or varicosities demonstrated.     2. Left lea. No deep or superficial vein thrombus.  2b. Deep vein reflux of the common femoral vein.  2c. Superficial reflux demonstrated within the Great Saphenous Vein  from the saphenofemoral junction to the proximal thigh and again at  the knee.  2d. No incompetent perforators or varicosities demonstrated.

## 2020-08-20 NOTE — PROGRESS NOTES
Heidy you call him and let him know he has venous reflux on his left leg and refer him to the vein clinic  Someone at [facility] (MD)  I put in order

## 2020-08-21 NOTE — PROGRESS NOTES
Called pt, no answer, LVM requesting return call to review non-urgent test results and recommendations.     ISABELL Guzmán 3:55 PM 8/21/2020

## 2020-08-25 NOTE — PROGRESS NOTES
Called pt on home and cell, no answer, LVMs requesting return call to discuss non-urgent test results and recommendations.     ISABELL Guzmán 2:32 PM 8/25/2020

## 2020-08-26 NOTE — PROGRESS NOTES
Called and spoke with pt.  Discussed that his venous US showed reflux in his left leg and PATIENCE Paris recommends he follow up with one of the vein providers to discuss treatment options. Pt verbalized understanding and states he will make an appt when he goes into the [place] clinic on 8/28/20 for BMP lab.     ISABELL Guzmán 11:01 AM 8/26/2020

## 2020-08-28 ENCOUNTER — TELEPHONE (OUTPATIENT)
Dept: CARDIOLOGY | Facility: CLINIC | Age: 65
End: 2020-08-28

## 2020-08-28 DIAGNOSIS — R60.9 EDEMA, UNSPECIFIED TYPE: Primary | ICD-10-CM

## 2020-08-28 DIAGNOSIS — R60.0 LOCALIZED EDEMA: ICD-10-CM

## 2020-08-28 LAB
ANION GAP SERPL CALCULATED.3IONS-SCNC: 6 MMOL/L (ref 3–14)
BUN SERPL-MCNC: 30 MG/DL (ref 7–30)
CALCIUM SERPL-MCNC: 8.5 MG/DL (ref 8.5–10.1)
CHLORIDE SERPL-SCNC: 108 MMOL/L (ref 94–109)
CO2 SERPL-SCNC: 25 MMOL/L (ref 20–32)
CREAT SERPL-MCNC: 1.38 MG/DL (ref 0.66–1.25)
GFR SERPL CREATININE-BSD FRML MDRD: 53 ML/MIN/{1.73_M2}
GLUCOSE SERPL-MCNC: 169 MG/DL (ref 70–99)
POTASSIUM SERPL-SCNC: 4 MMOL/L (ref 3.4–5.3)
SODIUM SERPL-SCNC: 139 MMOL/L (ref 133–144)

## 2020-08-28 PROCEDURE — 36415 COLL VENOUS BLD VENIPUNCTURE: CPT | Performed by: NURSE PRACTITIONER

## 2020-08-28 PROCEDURE — 80048 BASIC METABOLIC PNL TOTAL CA: CPT | Performed by: NURSE PRACTITIONER

## 2020-08-28 NOTE — TELEPHONE ENCOUNTER
RN called patient and reviewed with him his lab results and HEBER Micaela's recommendations. Patient verbalized understanding and is in agreement with plan.    Patient reports wt fluctuates, but is stable. Reports fluctuates from 270-274#'s. Patient reports edema and SOB remains about the same. Patient said he feels well, but has been pretty wiped out the past few days working outside in the heat. RN reinforced adequate hydration especially on the hot days and patient verbalized understanding.     RN will send updates to HEBER Micaela for review.     ----- Message from CHRISTIANE Andrew CNP sent at 8/28/2020 10:18 AM CDT -----  Creatinine is up some on lisinopril and diuretics    Have him hydrate and add a bmp to his visit on 9-18 at [facility] with me  If he has lost weight and swelling/breathing better let me know and I will reduce torsemide as well  thanks        Component      Latest Ref Rng & Units 8/28/2020   Sodium      133 - 144 mmol/L 139   Potassium      3.4 - 5.3 mmol/L 4.0   Chloride      94 - 109 mmol/L 108   Carbon Dioxide      20 - 32 mmol/L 25   Anion Gap      3 - 14 mmol/L 6   Glucose      70 - 99 mg/dL 169 (H)   Urea Nitrogen      7 - 30 mg/dL 30   Creatinine      0.66 - 1.25 mg/dL 1.38 (H)   GFR Estimate      >60 mL/min/1.73:m2 53 (L)   GFR Estimate If Black      >60 mL/min/1.73:m2 61   Calcium      8.5 - 10.1 mg/dL 8.5

## 2020-09-02 DIAGNOSIS — R09.02 HYPOXIA: ICD-10-CM

## 2020-09-02 RX ORDER — POTASSIUM CHLORIDE 750 MG/1
20 TABLET, EXTENDED RELEASE ORAL DAILY
Qty: 180 TABLET | Refills: 3 | Status: SHIPPED | OUTPATIENT
Start: 2020-09-02 | End: 2020-09-15

## 2020-09-15 ENCOUNTER — CARE COORDINATION (OUTPATIENT)
Dept: CARDIOLOGY | Facility: CLINIC | Age: 65
End: 2020-09-15

## 2020-09-15 DIAGNOSIS — R60.9 EDEMA, UNSPECIFIED TYPE: ICD-10-CM

## 2020-09-15 DIAGNOSIS — R09.02 HYPOXIA: ICD-10-CM

## 2020-09-15 DIAGNOSIS — I26.09 ACUTE COR PULMONALE (H): ICD-10-CM

## 2020-09-15 LAB
ANION GAP SERPL CALCULATED.3IONS-SCNC: 5 MMOL/L (ref 3–14)
BUN SERPL-MCNC: 38 MG/DL (ref 7–30)
CALCIUM SERPL-MCNC: 8.7 MG/DL (ref 8.5–10.1)
CHLORIDE SERPL-SCNC: 106 MMOL/L (ref 94–109)
CO2 SERPL-SCNC: 26 MMOL/L (ref 20–32)
CREAT SERPL-MCNC: 1.44 MG/DL (ref 0.66–1.25)
GFR SERPL CREATININE-BSD FRML MDRD: 50 ML/MIN/{1.73_M2}
GLUCOSE SERPL-MCNC: 149 MG/DL (ref 70–99)
POTASSIUM SERPL-SCNC: 3.8 MMOL/L (ref 3.4–5.3)
SODIUM SERPL-SCNC: 137 MMOL/L (ref 133–144)

## 2020-09-15 PROCEDURE — 36415 COLL VENOUS BLD VENIPUNCTURE: CPT | Performed by: NURSE PRACTITIONER

## 2020-09-15 PROCEDURE — 80048 BASIC METABOLIC PNL TOTAL CA: CPT | Performed by: NURSE PRACTITIONER

## 2020-09-15 RX ORDER — TORSEMIDE 10 MG/1
10 TABLET ORAL DAILY
Qty: 90 TABLET | Refills: 3 | Status: SHIPPED | OUTPATIENT
Start: 2020-09-15 | End: 2020-12-04

## 2020-09-15 RX ORDER — POTASSIUM CHLORIDE 750 MG/1
10 TABLET, EXTENDED RELEASE ORAL DAILY
Qty: 90 TABLET | Refills: 3 | Status: SHIPPED | OUTPATIENT
Start: 2020-09-15 | End: 2020-12-04

## 2020-09-15 NOTE — PROGRESS NOTES
Called pt on home phone, no answer and LVM requesting return call. Called pt on personal cell phone, no answer, left detailed message discussing that PATIENCE Paris reviewed labs done this AM and kidney function is up slightly, which indicates he is dry and she recommends she decrease torsemide to 10mg (1/2 of 20mg tab) daily and decrease KCL to 10mEq (1 tab) daily. Requested return call or [facility] message back that he received VM and understanding instructions and reminded pt of upcoming visit with PATIENCE Paris in [place] on 9/18/20 at 8:10am.     ISABELL Guzmán 2:34 PM 9/15/2020

## 2020-09-16 NOTE — PROGRESS NOTES
Received VM from pt who confirms he received VM and understood instructions to decrease torsemide to 10mg daily and KCL to 10mEq daily and will see PATIENCE Paris on 9/18/20.    ISABELL Guzmán 4:06 PM 9/16/2020

## 2020-09-17 NOTE — PROGRESS NOTES
History of Present Illness:     Aiden Almaraz is a 64-year-old gentleman who is returning for follow up on change from furosemide to torsemide.     He recently underwent a right heart cath after a visit with Dr. Mckeon.       We saw him in 2016 for atypical chest pain.  Nuclear stress testing was abnormal likely due to body habitus.  A left heart cath was done showing a 35% LAD lesion with a normal ejection fraction.  He had mild aortic root enlargement at that point.     He has been seen by Dr. No from pulmonary and also has gone to [facility].  He wears a CPAP for his sleep apnea.  He has had a concussion in the past which has left him with some memory deficits and dizziness.     Recently, he was complaining of chest discomfort, palpitations, fatigue, and shortness of breath.  He reported to Dr. Mckeon that his resting O2 sat at his physician's office was 86%.  With deep breathing improved to 92%.  Previous pulmonary function test showed mild restrictive lung disease.  He reported to Dr. Salguero that the lung clinic at [facility] told him his lungs looked fine with no concerning issues. I reviewed the pulmonary records from [facility]. His blood tests were unremarkable.  A sniff test was normal.  Pulmonary function testing demonstrated normal spirometry and DLCO. TSH was borderline high but T4 so far was normal.     Due to palpitations and the above complaints he underwent an MRI stress test which was unremarkable for ischemia.  Echocardiogram with bubble study was ordered but difficult because of body habitus.  There was perhaps some indication of basal inferolateral wall hypokinesis.  They reported the right ventricle as moderately dilated with decrease in function.  The aorta measured 40 mm at the base and 42 mm in the ascending portion.  The bubble study was suboptimal.     Based on this Dr. Mckeon sent the patient for transesophageal echocardiogram to rule out anomalous pulmonary venous return shunt and a  right heart cath was done to assess for pulmonary hypertension.      His transesophageal echocardiogram notes LV function at 60%.  The RV is moderately dilated and reduced in function. There is no atrial shunt on bubble study and the ascending aorta is mildly dilated at 3.8 cm.  All 4 pulmonary veins appear dilated with normal velocity and flow.     Right heart cath:  Pulmonary artery systolic pressures 43/23 with a mean of 29.  Right atrial pressure is 16.  Pulmonary wedge pressure was between 20 and 23 consistent with elevated filling pressures consistent likely with diastolic dysfunction.  Diuresis was recommended so I switched his lasix to torsemide.       He adds salt to his food which we talked about and he has effectively reduce this. He is now surprised how salty foods do taste if he eat high sodium choices.  He has started daily weights.  Since adding torsemide, he has dropped on home scale   Weight in 2016-- 273  -recently 287 at home on 7- and down to 273-274 at home     We talked about the need to continue his CPAP monitoring.        BMP on torsemide/Lisinopril showed creatinine elevation from 1.0 to 1.44; bun was up too  He tells me prior to the last two draws, he has not had fluids most of the day before as he was in the tractor all day. I reduced Torsemide to 10mg daily, potassium and left Lisinopril at the same dose.  I will plan to check a BMP when he is not dehydrated. He is planning to go to [facility] for 9 days so we will schedule this when he returns.    .  Lipids on Lipitor 10mg daily: LDL 80 HDL 30    He takes colestid prn for loose stools and infrequently        Exam: trace edema now on diuretics with venous stasis changes and healed ulceration Left anterior calf. Lungs clear; abdomen obese    I would prefer to follow up home weights     Impression/Plan:     1. CAD with lad 35% and RCA 10% on angiogram in 2016  Recent CMR stress test negative for ischemia     2. Preserved  LVEF     3. Hypoxia with diastolic heart failure/pulmonary hypertension and elevated wedge  -RV dysfunction  -continue torsemide 10mg/potassium 10meq daily  -daily weights  -he has stopped adding salt to foods; continue a low sodium diet  -elevate legs/support hose  - Lisinopril 5mg daily  -weight is up but BP and edema are better  -last 2 BMP's were done with no water intake most of the day before  -will repeat in a few weeks with hydration  -he is leaving for a trip for 9 days so will check upon return  -I told him could take an extra 10mg torsemide sparingly if he has edema on his trip        4. HTN  Controlled      5. No intracardiac shunt  Negative bubble study     6. Dilated aorta-mild 4.2cm  Continue to monitor by echo  Continue beta-blockers/ace     7. Sleep apnea  Continue CPAP  -due to ongoing fatigue, I suggested he have his CPAP reassessed to see if it is optimized     8. Lipids were last checked in 2016  LDL now 80 TG still high at 277  Increased Lipitor to 20mg daily  Lipid panel is schedule in November  He takes Colestid prn for loose stools not for cholesterol       9. 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      10. Lower extremity venous disease  -venous ultrasound shows incompetent veins  Has visit with Dr. Conde upcoming       It has been a pleasure seeing Aiden Almaraz in follow up.  I will await his labs and I will see him back in December for follow up.   Greater than 50% of this 30 minute visit was spent in counseling  Micaela Mendoza, MSN, APRN-BC, CNP  Cardiology    Orders Placed This Encounter   Procedures     Basic metabolic panel     No orders of the defined types were placed in this encounter.    There are no discontinued medications.      Encounter Diagnosis   Name Primary?     Coronary artery disease involving native coronary artery of native heart without angina pectoris        CURRENT MEDICATIONS:  Current Outpatient Medications   Medication Sig Dispense Refill      acetaminophen (TYLENOL) 500 MG tablet Take 500-1,000 mg by mouth every 6 hours as needed for mild pain       albuterol (PROAIR HFA, PROVENTIL HFA, VENTOLIN HFA) 108 (90 BASE) MCG/ACT inhaler Inhale 2 puffs into the lungs every 4 hours as needed for shortness of breath / dyspnea or wheezing       aspirin 81 MG tablet Take 1 tablet (81 mg) by mouth daily 30 tablet      atorvastatin (LIPITOR) 20 MG tablet Take 1 tablet (20 mg) by mouth daily 90 tablet 3     carvedilol (COREG) 6.25 MG tablet Take 1 tablet (6.25 mg) by mouth 2 times daily (with meals) 180 tablet 3     celecoxib (CELEBREX) 200 MG capsule Take 200 mg by mouth daily       cetirizine (ZYRTEC) 10 MG tablet Take 10 mg by mouth daily       colestipol (COLESTID) 1 G tablet Take 2 g by mouth 2 times daily Takes as needed for loose stools       Fluticasone Propionate (FLONASE NA) Spray 1 spray in nostril as needed       lisinopril (ZESTRIL) 5 MG tablet Take 1 tablet (5 mg) by mouth daily 90 tablet 3     meclizine 25 MG CHEW Take 25 mg by mouth every 6 hours as needed for dizziness       potassium chloride ER (KLOR-CON M) 10 MEQ CR tablet Take 1 tablet (10 mEq) by mouth daily 90 tablet 3     sertraline (ZOLOFT) 100 MG tablet Take 100 mg by mouth daily       sertraline (ZOLOFT) 50 MG tablet Take 50 mg by mouth daily       tamsulosin (FLOMAX) 0.4 MG capsule Take 0.4 mg by mouth daily       torsemide (DEMADEX) 10 MG tablet Take 1 tablet (10 mg) by mouth daily 90 tablet 3     vitamin D3 (CHOLECALCIFEROL) 41464 UNITS capsule Take 1,000 Units by mouth 2 times daily          ALLERGIES     Allergies   Allergen Reactions     Seasonal Allergies        PAST MEDICAL HISTORY:  Past Medical History:   Diagnosis Date     Corey's esophagus      CAD (coronary artery disease) 02/2016    mild, nonobstructive per cath 2016     Concussion 2016    fall went to ER--now with dizzy and memory issue     Degenerative disc disease, lumbar      Depression with anxiety      Gastroesophageal  reflux disease 1999    lap nissen at [facility] june 1999     Hyperlipidemia      IBS (irritable bowel syndrome)     on colestid for diarrhea not cholesterol     Impaired glucose tolerance      Lichen simplex chronicus      Metabolic syndrome      Mild ascending aorta dilation (H)     40mm     Osteomyelitis of right leg (H) 1979     Restrictive lung disease     Dr No, mild restriction PFT     Sleep apnea     cpap       PAST SURGICAL HISTORY:  Past Surgical History:   Procedure Laterality Date     CARPAL TUNNEL RELEASE RT/LT Bilateral      CHOLECYSTECTOMY       CV RIGHT HEART CATH Right 7/9/2020    Procedure: Right Heart Cath WITH FULL OXIMETRY;  Surgeon: Boaz Bustillo MD;  Location:  HEART CARDIAC CATH LAB     KNEE SURGERY      arthroscopic (pt doesn't recall which knee)     NISSEN FUNDOPLICATION       ROTATOR CUFF REPAIR RT/LT Right        FAMILY HISTORY:  Family History   Problem Relation Age of Onset     Other Cancer Mother      Hypertension Mother      Colon Cancer Father      Asthma Father        SOCIAL HISTORY:  Social History     Socioeconomic History     Marital status:      Spouse name: None     Number of children: None     Years of education: None     Highest education level: None   Occupational History     Occupation:      Comment: service food machines   Social Needs     Financial resource strain: None     Food insecurity     Worry: None     Inability: None     Transportation needs     Medical: None     Non-medical: None   Tobacco Use     Smoking status: Never Smoker     Smokeless tobacco: Never Used   Substance and Sexual Activity     Alcohol use: Yes     Alcohol/week: 0.0 standard drinks     Comment: occ     Drug use: None     Sexual activity: None   Lifestyle     Physical activity     Days per week: None     Minutes per session: None     Stress: None   Relationships     Social connections     Talks on phone: None     Gets together: None     Attends  "Mosque service: None     Active member of club or organization: None     Attends meetings of clubs or organizations: None     Relationship status: None     Intimate partner violence     Fear of current or ex partner: None     Emotionally abused: None     Physically abused: None     Forced sexual activity: None   Other Topics Concern     Parent/sibling w/ CABG, MI or angioplasty before 65F 55M? Not Asked      Service Not Asked     Blood Transfusions Not Asked     Caffeine Concern No     Comment: occ     Occupational Exposure Not Asked     Hobby Hazards Not Asked     Sleep Concern Not Asked     Stress Concern Not Asked     Weight Concern Not Asked     Special Diet No     Back Care Not Asked     Exercise No     Bike Helmet Not Asked     Seat Belt Not Asked     Self-Exams Not Asked   Social History Narrative     None       Review of Systems:  Skin:  Negative       Eyes:  Positive for glasses    ENT:  Positive for sinus trouble allergies  Respiratory:  Positive for dyspnea on exertion;sleep apnea;CPAP     Cardiovascular:    Positive for;lightheadedness;edema;fatigue    Gastroenterology: Positive for   gall bladder was removed  Genitourinary:  Positive for prostate problem    Musculoskeletal:  Positive for arthritis;neck pain;back pain shoulders, hands, knees; cramping in the hands  Neurologic:  Positive for numbness or tingling of hands \"weird feeling in feet\"  Psychiatric:  Positive for excessive stress;anxiety;depression    Heme/Lymph/Imm:  Positive for allergies    Endocrine:  Negative        Physical Exam:  Vitals: /62 (BP Location: Right arm, Patient Position: Sitting, Cuff Size: Adult Large)   Pulse 60   Ht 1.778 m (5' 10\")   Wt 126.7 kg (279 lb 4.8 oz)   BMI 40.08 kg/m      Constitutional:  cooperative, alert and oriented, well developed, well nourished, in no acute distress        Skin:  warm and dry to the touch, no apparent skin lesions or masses noted          Head:  normocephalic, no " masses or lesions        Eyes:           Lymph:      ENT:  no pallor or cyanosis, dentition good        Neck:           Respiratory:  normal breath sounds, clear to auscultation, normal A-P diameter, normal symmetry, normal respiratory excursion, no use of accessory muscles         Cardiac: regular rhythm, normal S1/S2, no S3 or S4, apical impulse not displaced, no murmurs, gallops or rubs                pulses full and equal, no bruits auscultated                                   RFA cath site is normal    GI:  abdomen soft, non-tender, BS normoactive, no mass, no HSM, no bruits obese difficult exam with body habitus    Extremities and Muscular Skeletal:  no deformities, clubbing, cyanosis, erythema observed     trace;telangiectasia;varicose vein telangiectasia;varicose vein;trace venous stasis changes--healed sores on left leg ??venous stasis ulcers    Neurological:           Psych:         Recent Lab Results:  LIPID RESULTS:  Lab Results   Component Value Date    CHOL 165 08/04/2020    HDL 30 (L) 08/04/2020    LDL 80 08/04/2020    TRIG 277 (H) 08/04/2020       LIVER ENZYME RESULTS:  Lab Results   Component Value Date    ALT 33 01/26/2016       CBC RESULTS:  Lab Results   Component Value Date    WBC 6.9 07/09/2020    RBC 4.58 07/09/2020    HGB 14.2 07/09/2020    HCT 42.9 07/09/2020    MCV 94 07/09/2020    MCH 31.0 07/09/2020    MCHC 33.1 07/09/2020    RDW 13.7 07/09/2020     (L) 07/09/2020       BMP RESULTS:  Lab Results   Component Value Date     09/15/2020    POTASSIUM 3.8 09/15/2020    CHLORIDE 106 09/15/2020    CO2 26 09/15/2020    ANIONGAP 5 09/15/2020     (H) 09/15/2020    BUN 38 (H) 09/15/2020    CR 1.44 (H) 09/15/2020    GFRESTIMATED 50 (L) 09/15/2020    GFRESTBLACK 58 (L) 09/15/2020    MARGA 8.7 09/15/2020        A1C RESULTS:  Lab Results   Component Value Date    A1C 5.6 01/26/2016       INR RESULTS:  Lab Results   Component Value Date    INR 1.09 07/09/2020    INR 1.09 02/01/2016            CC  Micaela Mendoza, APRN CNP  [id number]  [place] [address]

## 2020-09-18 ENCOUNTER — OFFICE VISIT (OUTPATIENT)
Dept: CARDIOLOGY | Facility: CLINIC | Age: 65
End: 2020-09-18
Attending: NURSE PRACTITIONER
Payer: MEDICARE

## 2020-09-18 VITALS
SYSTOLIC BLOOD PRESSURE: 108 MMHG | DIASTOLIC BLOOD PRESSURE: 62 MMHG | WEIGHT: 279.3 LBS | HEART RATE: 60 BPM | BODY MASS INDEX: 39.99 KG/M2 | HEIGHT: 70 IN

## 2020-09-18 DIAGNOSIS — I50.30 DIASTOLIC HEART FAILURE, UNSPECIFIED HF CHRONICITY (H): Primary | ICD-10-CM

## 2020-09-18 DIAGNOSIS — I25.10 CORONARY ARTERY DISEASE INVOLVING NATIVE CORONARY ARTERY OF NATIVE HEART WITHOUT ANGINA PECTORIS: ICD-10-CM

## 2020-09-18 PROCEDURE — 99214 OFFICE O/P EST MOD 30 MIN: CPT | Performed by: NURSE PRACTITIONER

## 2020-09-18 ASSESSMENT — MIFFLIN-ST. JEOR: SCORE: 2058.15

## 2020-09-18 NOTE — LETTER
9/18/2020    Calvin Desai  [facility] [address] [address]    RE: Aiden Almaraz       Dear Colleague,    I had the pleasure of seeing Aiden Almaraz in the [facility].    History of Present Illness:     Aiden Almaraz is a 64-year-old gentleman who is returning for follow up on change from furosemide to torsemide.     He recently underwent a right heart cath after a visit with Dr. Mckeon.       We saw him in 2016 for atypical chest pain.  Nuclear stress testing was abnormal likely due to body habitus.  A left heart cath was done showing a 35% LAD lesion with a normal ejection fraction.  He had mild aortic root enlargement at that point.     He has been seen by Dr. No from pulmonary and also has gone to [facility].  He wears a CPAP for his sleep apnea.  He has had a concussion in the past which has left him with some memory deficits and dizziness.     Recently, he was complaining of chest discomfort, palpitations, fatigue, and shortness of breath.  He reported to Dr. Mckeon that his resting O2 sat at his physician's office was 86%.  With deep breathing improved to 92%.  Previous pulmonary function test showed mild restrictive lung disease.  He reported to Dr. Salguero that the lung clinic at [place] told him his lungs looked fine with no concerning issues. I reviewed the pulmonary records from [facility]. His blood tests were unremarkable.  A sniff test was normal.  Pulmonary function testing demonstrated normal spirometry and DLCO. TSH was borderline high but T4 so far was normal.     Due to palpitations and the above complaints he underwent an MRI stress test which was unremarkable for ischemia.  Echocardiogram with bubble study was ordered but difficult because of body habitus.  There was perhaps some indication of basal inferolateral wall hypokinesis.  They reported the right ventricle as moderately dilated with decrease in function.  The aorta  measured 40 mm at the base and 42 mm in the ascending portion.  The bubble study was suboptimal.     Based on this Dr. Mckeon sent the patient for transesophageal echocardiogram to rule out anomalous pulmonary venous return shunt and a right heart cath was done to assess for pulmonary hypertension.      His transesophageal echocardiogram notes LV function at 60%.  The RV is moderately dilated and reduced in function. There is no atrial shunt on bubble study and the ascending aorta is mildly dilated at 3.8 cm.  All 4 pulmonary veins appear dilated with normal velocity and flow.     Right heart cath:  Pulmonary artery systolic pressures 43/23 with a mean of 29.  Right atrial pressure is 16.  Pulmonary wedge pressure was between 20 and 23 consistent with elevated filling pressures consistent likely with diastolic dysfunction.  Diuresis was recommended so I switched his lasix to torsemide.       He adds salt to his food which we talked about and he has effectively reduce this. He is now surprised how salty foods do taste if he eat high sodium choices.  He has started daily weights.  Since adding torsemide, he has dropped on home scale   Weight in 2016-- 273  -recently 287 at home on 7- and down to 273-274 at home     We talked about the need to continue his CPAP monitoring.        BMP on torsemide/Lisinopril showed creatinine elevation from 1.0 to 1.44; bun was up too  He tells me prior to the last two draws, he has not had fluids most of the day before as he was in the tractor all day. I reduced Torsemide to 10mg daily, potassium and left Lisinopril at the same dose.  I will plan to check a BMP when he is not dehydrated. He is planning to go to [facility]. for 9 days so we will schedule this when he returns.    .  Lipids on Lipitor 10mg daily: LDL 80 HDL 30    He takes colestid prn for loose stools and infrequently        Exam: trace edema now on diuretics with venous stasis changes and healed  ulceration Left anterior calf. Lungs clear; abdomen obese    I would prefer to follow up home weights     Impression/Plan:     1. CAD with lad 35% and RCA 10% on angiogram in 2016  Recent CMR stress test negative for ischemia     2. Preserved LVEF     3. Hypoxia with diastolic heart failure/pulmonary hypertension and elevated wedge  -RV dysfunction  -continue torsemide 10mg/potassium 10meq daily  -daily weights  -he has stopped adding salt to foods; continue a low sodium diet  -elevate legs/support hose  - Lisinopril 5mg daily  -weight is up but BP and edema are better  -last 2 BMP's were done with no water intake most of the day before  -will repeat in a few weeks with hydration  -he is leaving for a trip for 9 days so will check upon return  -I told him could take an extra 10mg torsemide sparingly if he has edema on his trip        4. HTN  Controlled      5. No intracardiac shunt  Negative bubble study     6. Dilated aorta-mild 4.2cm  Continue to monitor by echo  Continue beta-blockers/ace     7. Sleep apnea  Continue CPAP  -due to ongoing fatigue, I suggested he have his CPAP reassessed to see if it is optimized     8. Lipids were last checked in 2016  LDL now 80 TG still high at 277  Increased Lipitor to 20mg daily  Lipid panel is schedule in November  He takes Colestid prn for loose stools not for cholesterol       9. 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      10. Lower extremity venous disease  -venous ultrasound shows incompetent veins  Has visit with Dr. Conde upcoming       It has been a pleasure seeing Aiden Almaraz in follow up.  I will await his labs and I will see him back in December for follow up.   Greater than 50% of this 30 minute visit was spent in counseling  Micaela Mendoza, MSN, APRN-BC, CNP  Cardiology    Orders Placed This Encounter   Procedures     Basic metabolic panel     No orders of the defined types were placed in this encounter.    There are no discontinued  medications.      Encounter Diagnosis   Name Primary?     Coronary artery disease involving native coronary artery of native heart without angina pectoris        CURRENT MEDICATIONS:  Current Outpatient Medications   Medication Sig Dispense Refill     acetaminophen (TYLENOL) 500 MG tablet Take 500-1,000 mg by mouth every 6 hours as needed for mild pain       albuterol (PROAIR HFA, PROVENTIL HFA, VENTOLIN HFA) 108 (90 BASE) MCG/ACT inhaler Inhale 2 puffs into the lungs every 4 hours as needed for shortness of breath / dyspnea or wheezing       aspirin 81 MG tablet Take 1 tablet (81 mg) by mouth daily 30 tablet      atorvastatin (LIPITOR) 20 MG tablet Take 1 tablet (20 mg) by mouth daily 90 tablet 3     carvedilol (COREG) 6.25 MG tablet Take 1 tablet (6.25 mg) by mouth 2 times daily (with meals) 180 tablet 3     celecoxib (CELEBREX) 200 MG capsule Take 200 mg by mouth daily       cetirizine (ZYRTEC) 10 MG tablet Take 10 mg by mouth daily       colestipol (COLESTID) 1 G tablet Take 2 g by mouth 2 times daily Takes as needed for loose stools       Fluticasone Propionate (FLONASE NA) Spray 1 spray in nostril as needed       lisinopril (ZESTRIL) 5 MG tablet Take 1 tablet (5 mg) by mouth daily 90 tablet 3     meclizine 25 MG CHEW Take 25 mg by mouth every 6 hours as needed for dizziness       potassium chloride ER (KLOR-CON M) 10 MEQ CR tablet Take 1 tablet (10 mEq) by mouth daily 90 tablet 3     sertraline (ZOLOFT) 100 MG tablet Take 100 mg by mouth daily       sertraline (ZOLOFT) 50 MG tablet Take 50 mg by mouth daily       tamsulosin (FLOMAX) 0.4 MG capsule Take 0.4 mg by mouth daily       torsemide (DEMADEX) 10 MG tablet Take 1 tablet (10 mg) by mouth daily 90 tablet 3     vitamin D3 (CHOLECALCIFEROL) 79295 UNITS capsule Take 1,000 Units by mouth 2 times daily          ALLERGIES     Allergies   Allergen Reactions     Seasonal Allergies        PAST MEDICAL HISTORY:  Past Medical History:   Diagnosis Date     Madhav's  esophagus      CAD (coronary artery disease) 02/2016    mild, nonobstructive per cath 2016     Concussion 2016    fall went to ER--now with dizzy and memory issue     Degenerative disc disease, lumbar      Depression with anxiety      Gastroesophageal reflux disease 1999    lap nissen at [facility] june 1999     Hyperlipidemia      IBS (irritable bowel syndrome)     on colestid for diarrhea not cholesterol     Impaired glucose tolerance      Lichen simplex chronicus      Metabolic syndrome      Mild ascending aorta dilation (H)     40mm     Osteomyelitis of right leg (H) 1979     Restrictive lung disease     Dr No, mild restriction PFT     Sleep apnea     cpap       PAST SURGICAL HISTORY:  Past Surgical History:   Procedure Laterality Date     CARPAL TUNNEL RELEASE RT/LT Bilateral      CHOLECYSTECTOMY       CV RIGHT HEART CATH Right 7/9/2020    Procedure: Right Heart Cath WITH FULL OXIMETRY;  Surgeon: Boaz Bustillo MD;  Location:  HEART CARDIAC CATH LAB     KNEE SURGERY      arthroscopic (pt doesn't recall which knee)     NISSEN FUNDOPLICATION       ROTATOR CUFF REPAIR RT/LT Right        FAMILY HISTORY:  Family History   Problem Relation Age of Onset     Other Cancer Mother      Hypertension Mother      Colon Cancer Father      Asthma Father        SOCIAL HISTORY:  Social History     Socioeconomic History     Marital status:      Spouse name: None     Number of children: None     Years of education: None     Highest education level: None   Occupational History     Occupation:      Comment: service food machines   Social Needs     Financial resource strain: None     Food insecurity     Worry: None     Inability: None     Transportation needs     Medical: None     Non-medical: None   Tobacco Use     Smoking status: Never Smoker     Smokeless tobacco: Never Used   Substance and Sexual Activity     Alcohol use: Yes     Alcohol/week: 0.0 standard drinks     Comment: occ  "    Drug use: None     Sexual activity: None   Lifestyle     Physical activity     Days per week: None     Minutes per session: None     Stress: None   Relationships     Social connections     Talks on phone: None     Gets together: None     Attends Anabaptist service: None     Active member of club or organization: None     Attends meetings of clubs or organizations: None     Relationship status: None     Intimate partner violence     Fear of current or ex partner: None     Emotionally abused: None     Physically abused: None     Forced sexual activity: None   Other Topics Concern     Parent/sibling w/ CABG, MI or angioplasty before 65F 55M? Not Asked      Service Not Asked     Blood Transfusions Not Asked     Caffeine Concern No     Comment: occ     Occupational Exposure Not Asked     Hobby Hazards Not Asked     Sleep Concern Not Asked     Stress Concern Not Asked     Weight Concern Not Asked     Special Diet No     Back Care Not Asked     Exercise No     Bike Helmet Not Asked     Seat Belt Not Asked     Self-Exams Not Asked   Social History Narrative     None       Review of Systems:  Skin:  Negative       Eyes:  Positive for glasses    ENT:  Positive for sinus trouble allergies  Respiratory:  Positive for dyspnea on exertion;sleep apnea;CPAP     Cardiovascular:    Positive for;lightheadedness;edema;fatigue    Gastroenterology: Positive for   gall bladder was removed  Genitourinary:  Positive for prostate problem    Musculoskeletal:  Positive for arthritis;neck pain;back pain shoulders, hands, knees; cramping in the hands  Neurologic:  Positive for numbness or tingling of hands \"weird feeling in feet\"  Psychiatric:  Positive for excessive stress;anxiety;depression    Heme/Lymph/Imm:  Positive for allergies    Endocrine:  Negative        Physical Exam:  Vitals: /62 (BP Location: Right arm, Patient Position: Sitting, Cuff Size: Adult Large)   Pulse 60   Ht 1.778 m (5' 10\")   Wt 126.7 kg (279 lb 4.8 " oz)   BMI 40.08 kg/m      Constitutional:  cooperative, alert and oriented, well developed, well nourished, in no acute distress        Skin:  warm and dry to the touch, no apparent skin lesions or masses noted          Head:  normocephalic, no masses or lesions        Eyes:           Lymph:      ENT:  no pallor or cyanosis, dentition good        Neck:           Respiratory:  normal breath sounds, clear to auscultation, normal A-P diameter, normal symmetry, normal respiratory excursion, no use of accessory muscles         Cardiac: regular rhythm, normal S1/S2, no S3 or S4, apical impulse not displaced, no murmurs, gallops or rubs                pulses full and equal, no bruits auscultated                                   RFA cath site is normal    GI:  abdomen soft, non-tender, BS normoactive, no mass, no HSM, no bruits obese difficult exam with body habitus    Extremities and Muscular Skeletal:  no deformities, clubbing, cyanosis, erythema observed     trace;telangiectasia;varicose vein telangiectasia;varicose vein;trace venous stasis changes--healed sores on left leg ??venous stasis ulcers    Neurological:           Psych:         Recent Lab Results:  LIPID RESULTS:  Lab Results   Component Value Date    CHOL 165 08/04/2020    HDL 30 (L) 08/04/2020    LDL 80 08/04/2020    TRIG 277 (H) 08/04/2020       LIVER ENZYME RESULTS:  Lab Results   Component Value Date    ALT 33 01/26/2016       CBC RESULTS:  Lab Results   Component Value Date    WBC 6.9 07/09/2020    RBC 4.58 07/09/2020    HGB 14.2 07/09/2020    HCT 42.9 07/09/2020    MCV 94 07/09/2020    MCH 31.0 07/09/2020    MCHC 33.1 07/09/2020    RDW 13.7 07/09/2020     (L) 07/09/2020       BMP RESULTS:  Lab Results   Component Value Date     09/15/2020    POTASSIUM 3.8 09/15/2020    CHLORIDE 106 09/15/2020    CO2 26 09/15/2020    ANIONGAP 5 09/15/2020     (H) 09/15/2020    BUN 38 (H) 09/15/2020    CR 1.44 (H) 09/15/2020    GFRESTIMATED 50 (L)  09/15/2020    GFRESTBLACK 58 (L) 09/15/2020    MARGA 8.7 09/15/2020        A1C RESULTS:  Lab Results   Component Value Date    A1C 5.6 01/26/2016       INR RESULTS:  Lab Results   Component Value Date    INR 1.09 07/09/2020    INR 1.09 02/01/2016       Thank you for allowing me to participate in the care of your patient.    Sincerely,     CHRISTIANE [facility]

## 2020-10-08 ENCOUNTER — OFFICE VISIT (OUTPATIENT)
Dept: CARDIOLOGY | Facility: CLINIC | Age: 65
End: 2020-10-08
Attending: NURSE PRACTITIONER
Payer: MEDICARE

## 2020-10-08 VITALS
SYSTOLIC BLOOD PRESSURE: 105 MMHG | WEIGHT: 282.9 LBS | HEART RATE: 64 BPM | BODY MASS INDEX: 40.5 KG/M2 | HEIGHT: 70 IN | DIASTOLIC BLOOD PRESSURE: 64 MMHG

## 2020-10-08 DIAGNOSIS — R60.9 EDEMA, UNSPECIFIED TYPE: ICD-10-CM

## 2020-10-08 DIAGNOSIS — I25.10 CORONARY ARTERY DISEASE INVOLVING NATIVE CORONARY ARTERY OF NATIVE HEART WITHOUT ANGINA PECTORIS: ICD-10-CM

## 2020-10-08 LAB
ANION GAP SERPL CALCULATED.3IONS-SCNC: 6 MMOL/L (ref 3–14)
BUN SERPL-MCNC: 25 MG/DL (ref 7–30)
CALCIUM SERPL-MCNC: 8.3 MG/DL (ref 8.5–10.1)
CHLORIDE SERPL-SCNC: 108 MMOL/L (ref 94–109)
CO2 SERPL-SCNC: 25 MMOL/L (ref 20–32)
CREAT SERPL-MCNC: 1.1 MG/DL (ref 0.66–1.25)
GFR SERPL CREATININE-BSD FRML MDRD: 70 ML/MIN/{1.73_M2}
GLUCOSE SERPL-MCNC: 140 MG/DL (ref 70–99)
POTASSIUM SERPL-SCNC: 3.8 MMOL/L (ref 3.4–5.3)
SODIUM SERPL-SCNC: 139 MMOL/L (ref 133–144)

## 2020-10-08 PROCEDURE — 99214 OFFICE O/P EST MOD 30 MIN: CPT | Performed by: INTERNAL MEDICINE

## 2020-10-08 PROCEDURE — 80048 BASIC METABOLIC PNL TOTAL CA: CPT | Performed by: NURSE PRACTITIONER

## 2020-10-08 ASSESSMENT — MIFFLIN-ST. JEOR: SCORE: 2074.48

## 2020-10-08 NOTE — PROGRESS NOTES
Vascular Cardiology Consultation      Aiden Almaraz MRN# [id number]   YOB: 1955 Age: 64 year old   Date of Visit 10/08/2020     Reason for consult:  Venous ulcers           Assessment and Plan:     1. Bilateral venous ulcers from edema and venous hypertension    Deep veins with reflux, likely body habitus contributing to impaired venous return.  Superficial veins are essentially competent and likely not a significant contributing factor.    The patient feels that his edema was pretty well controlled on the higher dose torsemide.    We discussed that he may go back to his torsemide 20 mg daily and we may accept a creatinine of 1.4 and mild dehydration in order to keep his peripheral venous pressures down.    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    This note was transcribed using electronic voice recognition software, typographical errors may be present.                Chief Complaint:   Consult (left leg venous reflux )           History of Present Illness:   This patient is a very pleasant 64 year old male sent by Micaela Mendoza for venous hypertension and ulcers.  On physical exam, he has significant skin tears and edema that are consistent with venous hypertension.  He wears a tight belt and has a protuberant abdomen.  He has diastolic ingestive heart failure.    He states that his lower extremities were pretty well controlled with regard to edema with the switch to torsemide at the 20 mg dose.  Due to some renal insufficiency with creatinine around 1.4, his torsemide was reduced down to 10 mg daily.  Since that time, he has gained water weight and his legs have swelled again.  They are now actively weeping in the right lower extremity.    We reviewed the images of his venous competency test done on 8/14/2020.  There is significant deep vein reflux bilaterally, but no  "superficial incompetence on the right and just minimal scattered incompetence on the left.    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           Physical Exam:     Vitals: /64 (BP Location: Right arm, Patient Position: Sitting, Cuff Size: Adult Large)   Pulse 64   Ht 1.778 m (5' 10\")   Wt 128.3 kg (282 lb 14.4 oz)   BMI 40.59 kg/m    Constitutional:  cooperative, alert and oriented, well developed, well nourished, in no acute distress        Skin:  warm and dry to the touch venous stasis changes Weeping skin tears bilateral LE    Head:  normocephalic, no masses or lesions        Eyes:  pupils equal and round, conjunctivae and lids unremarkable, sclera white, no xanthalasma, EOMS intact, no nystagmus        ENT:  no pallor or cyanosis        Chest:  normal symmetry        Cardiac: normal S1 and S2                  Abdomen:    obese      Extremities and Back:  no deformities, clubbing, cyanosis, erythema observed   venous stasis changes--healed sores on left leg, active sores right venous stasis ulcers    Neurological:  no gross motor deficits                      Past Medical History:   I have reviewed this patient's past medical history  Past Medical History:   Diagnosis Date     Corey's esophagus      CAD (coronary artery disease) 02/2016    mild, nonobstructive per cath 2016     Concussion 2016    fall went to ER--now with dizzy and memory issue     Degenerative disc disease, lumbar      Depression with anxiety      Diastolic heart failure (H)      Gastroesophageal reflux disease 1999    [facility]     HTN (hypertension)      Hyperlipidemia      IBS (irritable bowel syndrome)     on colestid for diarrhea not cholesterol     Impaired glucose tolerance      Lichen simplex chronicus      Metabolic syndrome      Mild ascending " aorta dilation (H)     40mm     Osteomyelitis of right leg (H) 1979     Restrictive lung disease     Dr No, mild restriction PFT     Sleep apnea     cpap     Venous disease     lower extremity             Past Surgical History:   I have reviewed this patient's past surgical history  Past Surgical History:   Procedure Laterality Date     CARPAL TUNNEL RELEASE RT/LT Bilateral      CHOLECYSTECTOMY       CV RIGHT HEART CATH Right 7/9/2020    Procedure: Right Heart Cath WITH FULL OXIMETRY;  Surgeon: Boaz Bustillo MD;  Location:  HEART CARDIAC CATH LAB     KNEE SURGERY      arthroscopic (pt doesn't recall which knee)     NISSEN FUNDOPLICATION       ROTATOR CUFF REPAIR RT/LT Right                Social History:   I have reviewed this patient's social history  Social History     Tobacco Use     Smoking status: Never Smoker     Smokeless tobacco: Never Used   Substance Use Topics     Alcohol use: Yes     Alcohol/week: 0.0 standard drinks     Comment: occ             Family History:   I have reviewed this patient's family history  Family History   Problem Relation Age of Onset     Other Cancer Mother      Hypertension Mother      Colon Cancer Father      Asthma Father              Allergies:     Allergies   Allergen Reactions     Seasonal Allergies              Medications:   I have reviewed this patient's current medications  Current Outpatient Medications   Medication Sig Dispense Refill     acetaminophen (TYLENOL) 500 MG tablet Take 500-1,000 mg by mouth every 6 hours as needed for mild pain       albuterol (PROAIR HFA, PROVENTIL HFA, VENTOLIN HFA) 108 (90 BASE) MCG/ACT inhaler Inhale 2 puffs into the lungs every 4 hours as needed for shortness of breath / dyspnea or wheezing       aspirin 81 MG tablet Take 1 tablet (81 mg) by mouth daily 30 tablet      atorvastatin (LIPITOR) 20 MG tablet Take 1 tablet (20 mg) by mouth daily 90 tablet 3     carvedilol (COREG) 6.25 MG tablet Take 1 tablet (6.25 mg) by mouth 2  times daily (with meals) 180 tablet 3     celecoxib (CELEBREX) 200 MG capsule Take 200 mg by mouth daily       cetirizine (ZYRTEC) 10 MG tablet Take 10 mg by mouth daily       colestipol (COLESTID) 1 G tablet Take 2 g by mouth 2 times daily Takes as needed for loose stools       Fluticasone Propionate (FLONASE NA) Spray 1 spray in nostril as needed       lisinopril (ZESTRIL) 5 MG tablet Take 1 tablet (5 mg) by mouth daily 90 tablet 3     meclizine 25 MG CHEW Take 25 mg by mouth every 6 hours as needed for dizziness       omeprazole (PRILOSEC) 20 MG DR capsule        potassium chloride ER (KLOR-CON M) 10 MEQ CR tablet Take 1 tablet (10 mEq) by mouth daily 90 tablet 3     sertraline (ZOLOFT) 100 MG tablet Take 100 mg by mouth daily       sertraline (ZOLOFT) 50 MG tablet Take 50 mg by mouth daily       tamsulosin (FLOMAX) 0.4 MG capsule Take 0.4 mg by mouth daily       torsemide (DEMADEX) 10 MG tablet Take 1 tablet (10 mg) by mouth daily 90 tablet 3     vitamin D3 (CHOLECALCIFEROL) 46772 UNITS capsule Take 1,000 Units by mouth 2 times daily                  Review of Systems:       Review of Systems:  Skin:  Negative     Eyes:  Positive for glasses  ENT:  Negative    Respiratory:  Positive for dyspnea on exertion;sleep apnea;CPAP;shortness of breath  Cardiovascular:    edema;lightheadedness;Positive for  Gastroenterology: Positive for    Genitourinary:  Positive for prostate problem  Musculoskeletal:  Positive for arthritis;neck pain;back pain  Neurologic:  Positive for numbness or tingling of hands  Psychiatric:  Negative    Heme/Lymph/Imm:  Positive for allergies  Endocrine:  Negative                       Data:   All laboratory data reviewed  Lab Results   Component Value Date    CHOL 165 08/04/2020     Lab Results   Component Value Date    HDL 30 08/04/2020     Lab Results   Component Value Date    LDL 80 08/04/2020     Lab Results   Component Value Date    TRIG 277 08/04/2020     No results found for:  CHOLHDLRATIO  No results found for: TSH  Last Basic Metabolic Panel:  Lab Results   Component Value Date     10/08/2020      Lab Results   Component Value Date    POTASSIUM 3.8 10/08/2020     Lab Results   Component Value Date    CHLORIDE 108 10/08/2020     Lab Results   Component Value Date    MARGA 8.3 10/08/2020     Lab Results   Component Value Date    CO2 25 10/08/2020     Lab Results   Component Value Date    BUN 25 10/08/2020     Lab Results   Component Value Date    CR 1.10 10/08/2020     Lab Results   Component Value Date     10/08/2020     Lab Results   Component Value Date    WBC 6.9 07/09/2020     Lab Results   Component Value Date    RBC 4.58 07/09/2020     Lab Results   Component Value Date    HGB 14.2 07/09/2020     Lab Results   Component Value Date    HCT 42.9 07/09/2020     Lab Results   Component Value Date    MCV 94 07/09/2020     Lab Results   Component Value Date    MCH 31.0 07/09/2020     Lab Results   Component Value Date    MCHC 33.1 07/09/2020     Lab Results   Component Value Date    RDW 13.7 07/09/2020     Lab Results   Component Value Date     07/09/2020

## 2020-10-08 NOTE — LETTER
10/8/2020    Calvin Desai  Formerly [facility] [address] [place] [id number]    RE: Aiden Almaraz       Dear Colleague,    I had the pleasure of seeing Aiden Almaraz in the [facility].    Vascular Cardiology Consultation      Aiden Almaraz MRN# [id number]   YOB: 1955 Age: 64 year old   Date of Visit 10/08/2020     Reason for consult:  Venous ulcers           Assessment and Plan:     1. Bilateral venous ulcers from edema and venous hypertension    Deep veins with reflux, likely body habitus contributing to impaired venous return.  Superficial veins are essentially competent and likely not a significant contributing factor.    The patient feels that his edema was pretty well controlled on the higher dose torsemide.    We discussed that he may go back to his torsemide 20 mg daily and we may accept a creatinine of 1.4 and mild dehydration in order to keep his peripheral venous pressures down.    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    This note was transcribed using electronic voice recognition software, typographical errors may be present.                Chief Complaint:   Consult (left leg venous reflux )           History of Present Illness:   This patient is a very pleasant 64 year old male sent by Micaela Mendoza for venous hypertension and ulcers.  On physical exam, he has significant skin tears and edema that are consistent with venous hypertension.  He wears a tight belt and has a protuberant abdomen.  He has diastolic ingestive heart failure.    He states that his lower extremities were pretty well controlled with regard to edema with the switch to torsemide at the 20 mg dose.  Due to some renal insufficiency with creatinine around 1.4, his torsemide was reduced down to 10 mg daily.  Since that time, he has gained water  "weight and his legs have swelled again.  They are now actively weeping in the right lower extremity.    We reviewed the images of his venous competency test done on 8/14/2020.  There is significant deep vein reflux bilaterally, but no superficial incompetence on the right and just minimal scattered incompetence on the left.    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           Physical Exam:     Vitals: /64 (BP Location: Right arm, Patient Position: Sitting, Cuff Size: Adult Large)   Pulse 64   Ht 1.778 m (5' 10\")   Wt 128.3 kg (282 lb 14.4 oz)   BMI 40.59 kg/m    Constitutional:  cooperative, alert and oriented, well developed, well nourished, in no acute distress        Skin:  warm and dry to the touch venous stasis changes Weeping skin tears bilateral LE    Head:  normocephalic, no masses or lesions        Eyes:  pupils equal and round, conjunctivae and lids unremarkable, sclera white, no xanthalasma, EOMS intact, no nystagmus        ENT:  no pallor or cyanosis        Chest:  normal symmetry        Cardiac: normal S1 and S2                  Abdomen:    obese      Extremities and Back:  no deformities, clubbing, cyanosis, erythema observed   venous stasis changes--healed sores on left leg, active sores right venous stasis ulcers    Neurological:  no gross motor deficits                      Past Medical History:   I have reviewed this patient's past medical history  Past Medical History:   Diagnosis Date     Corey's esophagus      CAD (coronary artery disease) 02/2016    mild, nonobstructive per cath 2016     Concussion 2016    fall went to ER--now with dizzy and memory issue     Degenerative disc disease, lumbar      Depression with anxiety      Diastolic heart failure (H)      Gastroesophageal reflux disease 1999    lap nissen at [facility] june " 1999     HTN (hypertension)      Hyperlipidemia      IBS (irritable bowel syndrome)     on colestid for diarrhea not cholesterol     Impaired glucose tolerance      Lichen simplex chronicus      Metabolic syndrome      Mild ascending aorta dilation (H)     40mm     Osteomyelitis of right leg (H) 1979     Restrictive lung disease     Dr No, mild restriction PFT     Sleep apnea     cpap     Venous disease     lower extremity             Past Surgical History:   I have reviewed this patient's past surgical history  Past Surgical History:   Procedure Laterality Date     CARPAL TUNNEL RELEASE RT/LT Bilateral      CHOLECYSTECTOMY       CV RIGHT HEART CATH Right 7/9/2020    Procedure: Right Heart Cath WITH FULL OXIMETRY;  Surgeon: Boaz Bustillo MD;  Location:  HEART CARDIAC CATH LAB     KNEE SURGERY      arthroscopic (pt doesn't recall which knee)     NISSEN FUNDOPLICATION       ROTATOR CUFF REPAIR RT/LT Right                Social History:   I have reviewed this patient's social history  Social History     Tobacco Use     Smoking status: Never Smoker     Smokeless tobacco: Never Used   Substance Use Topics     Alcohol use: Yes     Alcohol/week: 0.0 standard drinks     Comment: occ             Family History:   I have reviewed this patient's family history  Family History   Problem Relation Age of Onset     Other Cancer Mother      Hypertension Mother      Colon Cancer Father      Asthma Father              Allergies:     Allergies   Allergen Reactions     Seasonal Allergies              Medications:   I have reviewed this patient's current medications  Current Outpatient Medications   Medication Sig Dispense Refill     acetaminophen (TYLENOL) 500 MG tablet Take 500-1,000 mg by mouth every 6 hours as needed for mild pain       albuterol (PROAIR HFA, PROVENTIL HFA, VENTOLIN HFA) 108 (90 BASE) MCG/ACT inhaler Inhale 2 puffs into the lungs every 4 hours as needed for shortness of breath / dyspnea or wheezing        aspirin 81 MG tablet Take 1 tablet (81 mg) by mouth daily 30 tablet      atorvastatin (LIPITOR) 20 MG tablet Take 1 tablet (20 mg) by mouth daily 90 tablet 3     carvedilol (COREG) 6.25 MG tablet Take 1 tablet (6.25 mg) by mouth 2 times daily (with meals) 180 tablet 3     celecoxib (CELEBREX) 200 MG capsule Take 200 mg by mouth daily       cetirizine (ZYRTEC) 10 MG tablet Take 10 mg by mouth daily       colestipol (COLESTID) 1 G tablet Take 2 g by mouth 2 times daily Takes as needed for loose stools       Fluticasone Propionate (FLONASE NA) Spray 1 spray in nostril as needed       lisinopril (ZESTRIL) 5 MG tablet Take 1 tablet (5 mg) by mouth daily 90 tablet 3     meclizine 25 MG CHEW Take 25 mg by mouth every 6 hours as needed for dizziness       omeprazole (PRILOSEC) 20 MG DR capsule        potassium chloride ER (KLOR-CON M) 10 MEQ CR tablet Take 1 tablet (10 mEq) by mouth daily 90 tablet 3     sertraline (ZOLOFT) 100 MG tablet Take 100 mg by mouth daily       sertraline (ZOLOFT) 50 MG tablet Take 50 mg by mouth daily       tamsulosin (FLOMAX) 0.4 MG capsule Take 0.4 mg by mouth daily       torsemide (DEMADEX) 10 MG tablet Take 1 tablet (10 mg) by mouth daily 90 tablet 3     vitamin D3 (CHOLECALCIFEROL) 92453 UNITS capsule Take 1,000 Units by mouth 2 times daily                  Review of Systems:       Review of Systems:  Skin:  Negative     Eyes:  Positive for glasses  ENT:  Negative    Respiratory:  Positive for dyspnea on exertion;sleep apnea;CPAP;shortness of breath  Cardiovascular:    edema;lightheadedness;Positive for  Gastroenterology: Positive for    Genitourinary:  Positive for prostate problem  Musculoskeletal:  Positive for arthritis;neck pain;back pain  Neurologic:  Positive for numbness or tingling of hands  Psychiatric:  Negative    Heme/Lymph/Imm:  Positive for allergies  Endocrine:  Negative                       Data:   All laboratory data reviewed  Lab Results   Component Value Date    CHOL  165 08/04/2020     Lab Results   Component Value Date    HDL 30 08/04/2020     Lab Results   Component Value Date    LDL 80 08/04/2020     Lab Results   Component Value Date    TRIG 277 08/04/2020     No results found for: CHOLHDLRATIO  No results found for: TSH  Last Basic Metabolic Panel:  Lab Results   Component Value Date     10/08/2020      Lab Results   Component Value Date    POTASSIUM 3.8 10/08/2020     Lab Results   Component Value Date    CHLORIDE 108 10/08/2020     Lab Results   Component Value Date    AMRGA 8.3 10/08/2020     Lab Results   Component Value Date    CO2 25 10/08/2020     Lab Results   Component Value Date    BUN 25 10/08/2020     Lab Results   Component Value Date    CR 1.10 10/08/2020     Lab Results   Component Value Date     10/08/2020     Lab Results   Component Value Date    WBC 6.9 07/09/2020     Lab Results   Component Value Date    RBC 4.58 07/09/2020     Lab Results   Component Value Date    HGB 14.2 07/09/2020     Lab Results   Component Value Date    HCT 42.9 07/09/2020     Lab Results   Component Value Date    MCV 94 07/09/2020     Lab Results   Component Value Date    MCH 31.0 07/09/2020     Lab Results   Component Value Date    MCHC 33.1 07/09/2020     Lab Results   Component Value Date    RDW 13.7 07/09/2020     Lab Results   Component Value Date     07/09/2020       Thank you for allowing me to participate in the care of your patient.    Sincerely,     Ephraim Conde MD     [facility]

## 2020-11-07 ENCOUNTER — HEALTH MAINTENANCE LETTER (OUTPATIENT)
Age: 65
End: 2020-11-07

## 2020-11-10 DIAGNOSIS — E78.5 HYPERLIPIDEMIA: ICD-10-CM

## 2020-11-10 LAB
ALT SERPL W P-5'-P-CCNC: 33 U/L (ref 0–70)
CHOLEST SERPL-MCNC: 120 MG/DL
HDLC SERPL-MCNC: 33 MG/DL
LDLC SERPL CALC-MCNC: 46 MG/DL
NONHDLC SERPL-MCNC: 87 MG/DL
TRIGL SERPL-MCNC: 206 MG/DL

## 2020-11-10 PROCEDURE — 84460 ALANINE AMINO (ALT) (SGPT): CPT | Performed by: NURSE PRACTITIONER

## 2020-11-10 PROCEDURE — 36415 COLL VENOUS BLD VENIPUNCTURE: CPT | Performed by: NURSE PRACTITIONER

## 2020-11-10 PROCEDURE — 80061 LIPID PANEL: CPT | Performed by: NURSE PRACTITIONER

## 2020-12-04 ENCOUNTER — CARE COORDINATION (OUTPATIENT)
Dept: CARDIOLOGY | Facility: CLINIC | Age: 65
End: 2020-12-04

## 2020-12-04 ENCOUNTER — OFFICE VISIT (OUTPATIENT)
Dept: CARDIOLOGY | Facility: CLINIC | Age: 65
End: 2020-12-04
Payer: COMMERCIAL

## 2020-12-04 VITALS
HEIGHT: 70 IN | SYSTOLIC BLOOD PRESSURE: 105 MMHG | DIASTOLIC BLOOD PRESSURE: 72 MMHG | WEIGHT: 280.7 LBS | OXYGEN SATURATION: 97 % | BODY MASS INDEX: 40.18 KG/M2 | HEART RATE: 68 BPM

## 2020-12-04 DIAGNOSIS — E78.2 MIXED HYPERLIPIDEMIA: ICD-10-CM

## 2020-12-04 DIAGNOSIS — I26.09 ACUTE COR PULMONALE (H): Primary | ICD-10-CM

## 2020-12-04 DIAGNOSIS — I26.09 ACUTE COR PULMONALE (H): ICD-10-CM

## 2020-12-04 DIAGNOSIS — R09.02 HYPOXIA: ICD-10-CM

## 2020-12-04 LAB
ANION GAP SERPL CALCULATED.3IONS-SCNC: 2 MMOL/L (ref 3–14)
BUN SERPL-MCNC: 35 MG/DL (ref 7–30)
CALCIUM SERPL-MCNC: 9.2 MG/DL (ref 8.5–10.1)
CHLORIDE SERPL-SCNC: 105 MMOL/L (ref 94–109)
CO2 SERPL-SCNC: 31 MMOL/L (ref 20–32)
CREAT SERPL-MCNC: 1.3 MG/DL (ref 0.66–1.25)
GFR SERPL CREATININE-BSD FRML MDRD: 57 ML/MIN/{1.73_M2}
GLUCOSE SERPL-MCNC: 142 MG/DL (ref 70–99)
POTASSIUM SERPL-SCNC: 3.9 MMOL/L (ref 3.4–5.3)
SODIUM SERPL-SCNC: 138 MMOL/L (ref 133–144)

## 2020-12-04 PROCEDURE — 36415 COLL VENOUS BLD VENIPUNCTURE: CPT | Performed by: NURSE PRACTITIONER

## 2020-12-04 PROCEDURE — 80048 BASIC METABOLIC PNL TOTAL CA: CPT | Performed by: NURSE PRACTITIONER

## 2020-12-04 PROCEDURE — 99214 OFFICE O/P EST MOD 30 MIN: CPT | Performed by: NURSE PRACTITIONER

## 2020-12-04 RX ORDER — TORSEMIDE 20 MG/1
20 TABLET ORAL DAILY
Qty: 90 TABLET | Refills: 3 | COMMUNITY
Start: 2020-12-04 | End: 2021-08-27

## 2020-12-04 RX ORDER — POTASSIUM CHLORIDE 750 MG/1
10 TABLET, EXTENDED RELEASE ORAL 2 TIMES DAILY
Qty: 180 TABLET | Refills: 3 | Status: SHIPPED | OUTPATIENT
Start: 2020-12-04 | End: 2022-02-16

## 2020-12-04 ASSESSMENT — MIFFLIN-ST. JEOR: SCORE: 2059.5

## 2020-12-04 NOTE — PROGRESS NOTES
History of Present Illness:     Aiden Almaraz is a 65-year-old gentleman who is returning for follow up on sob and edema.     We saw him in 2016 for atypical chest pain.  Nuclear stress testing was abnormal likely due to body habitus.  A left heart cath was done showing a 35% LAD lesion with a normal ejection fraction.  He had mild aortic root enlargement at that point.     He has been seen by Dr. No from pulmonary and also has gone to [facility].  He wears a CPAP for his sleep apnea.  He has had a concussion in the past which has left him with some memory deficits and dizziness.     Recently, he was complaining of chest discomfort, palpitations, fatigue, and shortness of breath.  He reported to Dr. Mckeon that his resting O2 sat at his physician's office was 86%.  With deep breathing improved to 92%.  Previous pulmonary function test showed mild restrictive lung disease.  He reported to Dr. Mckeon that the lung clinic at [place] told him his lungs looked fine with no concerning issues. I reviewed the pulmonary records from [facility]. His blood tests were unremarkable.  A sniff test was normal.  Pulmonary function testing demonstrated normal spirometry and DLCO. TSH was borderline high but T4 so far was normal. He has sleep apnea and wears CPAP.     Due to palpitations and the above complaints he underwent an MRI stress test which was unremarkable for ischemia.  Echocardiogram with bubble study was ordered, but difficult because of body habitus.  There was perhaps some indication of basal inferolateral wall hypokinesis.  They reported the right ventricle as moderately dilated with decrease in function.  The aorta measured 40 mm at the base and 42 mm in the ascending portion.  The bubble study was suboptimal.     Based on this Dr. Mckeon sent the patient for transesophageal echocardiogram to rule out anomalous pulmonary venous return shunt and a right heart cath was done to assess for pulmonary  hypertension.      His transesophageal echocardiogram notes LV function at 60%.  The RV is moderately dilated and reduced in function. There is no atrial shunt on bubble study and the ascending aorta is mildly dilated at 3.8 cm.  All 4 pulmonary veins appear dilated with normal velocity and flow.     Right heart cath:  Pulmonary artery systolic pressures 43/23 with a mean of 29.  Right atrial pressure is 16.  Pulmonary wedge pressure was between 20 and 23 consistent with elevated filling pressures consistent likely with diastolic dysfunction.  Diuresis was recommended so I switched his lasix to torsemide.      He added salt to his food which we talked about and he has effectively reduce this. He is now surprised how salty foods do taste if he eat high sodium choices.  He has started daily weights.  Since adding torsemide, he has dropped on home scale and here he is down from a peak weight of 287 to 280.           BMP on torsemide/Lisinopril showed creatinine elevation from 1.0 to 1.44; bun was up too but he was without hydration before that draw. I recheck this with hydration and creatinine was down to 1.10. He has gone back to 20mg of torsemide per day to keep his edema at bay so I will check a BMP again today.       Lipids on Lipitor 20mg daily: LDL 46 HDL 33   He takes colestid prn for loose stools        Exam: trace edema now on diuretics with venous stasis changes and healed ulceration left anterior calf. Ultrasound shows some venous incompetence. He saw Dr. Conde who felt his venous issues were likely from deep veins with reflux, body habitus; he suggested  20mg Torsemide for better control of his edema. He is back on 20mg daily and is doing well on this dose; edema is better again    Lungs clear; abdomen obese  His venous stasis ulcers have healed with reduction in edema.          Impression/Plan:     1. CAD with lad 35% and RCA 10% on angiogram in 2016  Recent CMR stress test negative for ischemia  -no  angina     2. Preserved LVEF     3. Hypoxia with diastolic heart failure/pulmonary hypertension and elevated wedge  -RV dysfunction  -continue torsemide 20mg/potassium 10meq twice daily  -daily weights  -he has stopped adding salt to foods; continue a low sodium diet  -elevate legs/support hose  - Lisinopril 5mg daily  -weight stable; edema better  -check BMP today        4. HTN  Controlled      5. No intracardiac shunt  Negative bubble study     6. Dilated aorta-mild 4.2cm  Continue to monitor by echo  Continue beta-blockers/ace  Echo in 6 months     7. Sleep apnea  Continue CPAP  -he has ongoing fatigue and has had his CPAP checked and states it is optimally set     8. Hyperlipidemia  Improved following increase of his Lipitor to 20mg daily        9. 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      10. Lower extremity venous disease with ulcerations  -venous ultrasound shows incompetent veins  -he has been seen by Dr Conde  Continue diuretics and support hose/torsemide        It has been a pleasure seeing Aiden Almaraz in follow up. I will call him with his labs and we will plan to see him in 6 months with repeat echo.    Micaela Mendoza, MSN, APRN-BC, CNP  Cardiology    Orders Placed This Encounter   Procedures     Basic metabolic panel     Basic metabolic panel     Lipid Profile     ALT     Follow-Up with Cardiologist     Echocardiogram Complete     Orders Placed This Encounter   Medications     torsemide (DEMADEX) 20 MG tablet     Sig: Take 1 tablet (20 mg) by mouth daily     Dispense:  90 tablet     Refill:  3     potassium chloride ER (KLOR-CON M) 10 MEQ CR tablet     Sig: Take 1 tablet (10 mEq) by mouth 2 times daily     Dispense:  180 tablet     Refill:  3     Medications Discontinued During This Encounter   Medication Reason     torsemide (DEMADEX) 10 MG tablet Reorder     potassium chloride ER (KLOR-CON M) 10 MEQ CR tablet Reorder         Encounter Diagnoses   Name Primary?     Acute cor pulmonale  (H)      Hypoxia      Mixed hyperlipidemia Yes       CURRENT MEDICATIONS:  Current Outpatient Medications   Medication Sig Dispense Refill     acetaminophen (TYLENOL) 500 MG tablet Take 500-1,000 mg by mouth every 6 hours as needed for mild pain       albuterol (PROAIR HFA, PROVENTIL HFA, VENTOLIN HFA) 108 (90 BASE) MCG/ACT inhaler Inhale 2 puffs into the lungs every 4 hours as needed for shortness of breath / dyspnea or wheezing       aspirin 81 MG tablet Take 1 tablet (81 mg) by mouth daily 30 tablet      atorvastatin (LIPITOR) 20 MG tablet Take 1 tablet (20 mg) by mouth daily 90 tablet 3     carvedilol (COREG) 6.25 MG tablet Take 1 tablet (6.25 mg) by mouth 2 times daily (with meals) 180 tablet 3     celecoxib (CELEBREX) 200 MG capsule Take 200 mg by mouth daily       cetirizine (ZYRTEC) 10 MG tablet Take 10 mg by mouth daily       colestipol (COLESTID) 1 G tablet Take 2 g by mouth 2 times daily Takes as needed for loose stools       Fluticasone Propionate (FLONASE NA) Spray 1 spray in nostril as needed       lisinopril (ZESTRIL) 5 MG tablet Take 1 tablet (5 mg) by mouth daily 90 tablet 3     meclizine 25 MG CHEW Take 25 mg by mouth every 6 hours as needed for dizziness       omeprazole (PRILOSEC) 20 MG DR capsule        potassium chloride ER (KLOR-CON M) 10 MEQ CR tablet Take 1 tablet (10 mEq) by mouth 2 times daily 180 tablet 3     sertraline (ZOLOFT) 100 MG tablet Take 100 mg by mouth daily       sertraline (ZOLOFT) 50 MG tablet Take 50 mg by mouth daily       tamsulosin (FLOMAX) 0.4 MG capsule Take 0.4 mg by mouth daily       torsemide (DEMADEX) 20 MG tablet Take 1 tablet (20 mg) by mouth daily 90 tablet 3     vitamin D3 (CHOLECALCIFEROL) 75486 UNITS capsule Take 1,000 Units by mouth 2 times daily          ALLERGIES     Allergies   Allergen Reactions     Seasonal Allergies        PAST MEDICAL HISTORY:  Past Medical History:   Diagnosis Date     Corey's esophagus      CAD (coronary artery disease) 02/2016     mild, nonobstructive per cath 2016     Concussion 2016    fall went to ER--now with dizzy and memory issue     Degenerative disc disease, lumbar      Depression with anxiety      Diastolic heart failure (H)      Gastroesophageal reflux disease 1999    lap nissen at [facility] june 1999     HTN (hypertension)      Hyperlipidemia      IBS (irritable bowel syndrome)     on colestid for diarrhea not cholesterol     Impaired glucose tolerance      Lichen simplex chronicus      Metabolic syndrome      Mild ascending aorta dilation (H)     40mm     Osteomyelitis of right leg (H) 1979     Restrictive lung disease     Dr No, mild restriction PFT     Sleep apnea     cpap     Venous disease     lower extremity       PAST SURGICAL HISTORY:  Past Surgical History:   Procedure Laterality Date     CARPAL TUNNEL RELEASE RT/LT Bilateral      CHOLECYSTECTOMY       CV RIGHT HEART CATH Right 7/9/2020    Procedure: Right Heart Cath WITH FULL OXIMETRY;  Surgeon: Boaz Bustillo MD;  Location:  HEART CARDIAC CATH LAB     KNEE SURGERY      arthroscopic (pt doesn't recall which knee)     NISSEN FUNDOPLICATION       ROTATOR CUFF REPAIR RT/LT Right        FAMILY HISTORY:  Family History   Problem Relation Age of Onset     Other Cancer Mother      Hypertension Mother      Colon Cancer Father      Asthma Father        SOCIAL HISTORY:  Social History     Socioeconomic History     Marital status:      Spouse name: None     Number of children: None     Years of education: None     Highest education level: None   Occupational History     Occupation:      Comment: service food machines   Social Needs     Financial resource strain: None     Food insecurity     Worry: None     Inability: None     Transportation needs     Medical: None     Non-medical: None   Tobacco Use     Smoking status: Never Smoker     Smokeless tobacco: Never Used   Substance and Sexual Activity     Alcohol use: Yes     Alcohol/week:  "0.0 standard drinks     Comment: occ     Drug use: None     Sexual activity: None   Lifestyle     Physical activity     Days per week: None     Minutes per session: None     Stress: None   Relationships     Social connections     Talks on phone: None     Gets together: None     Attends [employer] service: None     Active member of club or organization: None     Attends meetings of clubs or organizations: None     Relationship status: None     Intimate partner violence     Fear of current or ex partner: None     Emotionally abused: None     Physically abused: None     Forced sexual activity: None   Other Topics Concern     Parent/sibling w/ CABG, MI or angioplasty before 65F 55M? Not Asked      Service Not Asked     Blood Transfusions Not Asked     Caffeine Concern No     Comment: occ     Occupational Exposure Not Asked     Hobby Hazards Not Asked     Sleep Concern Not Asked     Stress Concern Not Asked     Weight Concern Not Asked     Special Diet No     Back Care Not Asked     Exercise No     Bike Helmet Not Asked     Seat Belt Not Asked     Self-Exams Not Asked   Social History Narrative     None       Review of Systems:  Skin:  Negative       Eyes:  Positive for glasses    ENT:  Positive for sinus trouble allergies  Respiratory:  Positive for sleep apnea;CPAP;dyspnea on exertion;shortness of breath     Cardiovascular:    edema;Positive for;lightheadedness;dizziness    Gastroenterology: Positive for   gall bladder was removed  Genitourinary:  Positive for prostate problem    Musculoskeletal:  Positive for arthritis;neck pain;back pain    Neurologic:  Negative      Psychiatric:  Negative      Heme/Lymph/Imm:  Positive for allergies    Endocrine:  Negative        Physical Exam:  Vitals: /72 (BP Location: Right arm, Patient Position: Sitting, Cuff Size: Adult Large)   Pulse 68   Ht 1.778 m (5' 10\")   Wt 127.3 kg (280 lb 11.2 oz)   SpO2 97%   BMI 40.28 kg/m      Constitutional:  cooperative, alert " and oriented, well developed, well nourished, in no acute distress        Skin:  warm and dry to the touch venous stasis changes   Weeping skin tears bilateral LE    Head:  normocephalic, no masses or lesions        Eyes:  pupils equal and round, conjunctivae and lids unremarkable, sclera white, no xanthalasma, EOMS intact, no nystagmus        Lymph:      ENT:  no pallor or cyanosis        Neck:           Respiratory:  normal symmetry         Cardiac: normal S1 and S2                pulses full and equal, no bruits auscultated                                   RFA cath site is normal    GI:  abdomen soft, non-tender, BS normoactive, no mass, no HSM, no bruits obese difficult exam with body habitus    Extremities and Muscular Skeletal:  no deformities, clubbing, cyanosis, erythema observed   trace trace;telangiectasia;varicose vein telangiectasia;varicose vein;trace healed venous ulcerations; 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    Neurological:  no gross motor deficits        Psych:  Alert and Oriented x 3      Recent Lab Results:  LIPID RESULTS:  Lab Results   Component Value Date    CHOL 120 11/10/2020    HDL 33 (L) 11/10/2020    LDL 46 11/10/2020    TRIG 206 (H) 11/10/2020       LIVER ENZYME RESULTS:  Lab Results   Component Value Date    ALT 33 11/10/2020       CBC RESULTS:  Lab Results   Component Value Date    WBC 6.9 07/09/2020    RBC 4.58 07/09/2020    HGB 14.2 07/09/2020    HCT 42.9 07/09/2020    MCV 94 07/09/2020    MCH 31.0 07/09/2020    MCHC 33.1 07/09/2020    RDW 13.7 07/09/2020     (L) 07/09/2020       BMP RESULTS:  Lab Results   Component Value Date     10/08/2020    POTASSIUM 3.8 10/08/2020    CHLORIDE 108 10/08/2020    CO2 25 10/08/2020    ANIONGAP 6 10/08/2020     (H) 10/08/2020    BUN 25 10/08/2020    CR 1.10 10/08/2020    GFRESTIMATED 70 10/08/2020    GFRESTBLACK 81 10/08/2020    MARGA 8.3 (L) 10/08/2020        A1C RESULTS:  Lab Results   Component  Value Date    A1C 5.6 01/26/2016       INR RESULTS:  Lab Results   Component Value Date    INR 1.09 07/09/2020    INR 1.09 02/01/2016           CC  No referring provider defined for this encounter.

## 2020-12-04 NOTE — LETTER
12/4/2020    Calvin Desai  [facility] [address] [address]    RE: Aiden Almaraz       Dear Colleague,    I had the pleasure of seeing Aiden Almaraz in the [facility].    History of Present Illness:     Aiden Almaraz is a 65-year-old gentleman who is returning for follow up on sob and edema.     We saw him in 2016 for atypical chest pain.  Nuclear stress testing was abnormal likely due to body habitus.  A left heart cath was done showing a 35% LAD lesion with a normal ejection fraction.  He had mild aortic root enlargement at that point.     He has been seen by Dr. No from pulmonary and also has gone to [facility].  He wears a CPAP for his sleep apnea.  He has had a concussion in the past which has left him with some memory deficits and dizziness.     Recently, he was complaining of chest discomfort, palpitations, fatigue, and shortness of breath.  He reported to Dr. Mckeon that his resting O2 sat at his physician's office was 86%.  With deep breathing improved to 92%.  Previous pulmonary function test showed mild restrictive lung disease.  He reported to Dr. Mckeon that the lung clinic at [place] told him his lungs looked fine with no concerning issues. I reviewed the pulmonary records from [facility]. His blood tests were unremarkable.  A sniff test was normal.  Pulmonary function testing demonstrated normal spirometry and DLCO. TSH was borderline high but T4 so far was normal. He has sleep apnea and wears CPAP.     Due to palpitations and the above complaints he underwent an MRI stress test which was unremarkable for ischemia.  Echocardiogram with bubble study was ordered, but difficult because of body habitus.  There was perhaps some indication of basal inferolateral wall hypokinesis.  They reported the right ventricle as moderately dilated with decrease in function.  The aorta measured 40 mm at the base and 42 mm in the ascending portion.   The bubble study was suboptimal.     Based on this Dr. Mckeon sent the patient for transesophageal echocardiogram to rule out anomalous pulmonary venous return shunt and a right heart cath was done to assess for pulmonary hypertension.      His transesophageal echocardiogram notes LV function at 60%.  The RV is moderately dilated and reduced in function. There is no atrial shunt on bubble study and the ascending aorta is mildly dilated at 3.8 cm.  All 4 pulmonary veins appear dilated with normal velocity and flow.     Right heart cath:  Pulmonary artery systolic pressures 43/23 with a mean of 29.  Right atrial pressure is 16.  Pulmonary wedge pressure was between 20 and 23 consistent with elevated filling pressures consistent likely with diastolic dysfunction.  Diuresis was recommended so I switched his lasix to torsemide.      He added salt to his food which we talked about and he has effectively reduce this. He is now surprised how salty foods do taste if he eat high sodium choices.  He has started daily weights.  Since adding torsemide, he has dropped on home scale and here he is down from a peak weight of 287 to 280.           BMP on torsemide/Lisinopril showed creatinine elevation from 1.0 to 1.44; bun was up too but he was without hydration before that draw. I recheck this with hydration and creatinine was down to 1.10. He has gone back to 20mg of torsemide per day to keep his edema at bay so I will check a BMP again today.       Lipids on Lipitor 20mg daily: LDL 46 HDL 33   He takes colestid prn for loose stools        Exam: trace edema now on diuretics with venous stasis changes and healed ulceration left anterior calf. Ultrasound shows some venous incompetence. He saw Dr. Conde who felt his venous issues were likely from deep veins with reflux, body habitus; he suggested  20mg Torsemide for better control of his edema. He is back on 20mg daily and is doing well on this dose; edema is better  again    Lungs clear; abdomen obese  His venous stasis ulcers have healed with reduction in edema.          Impression/Plan:     1. CAD with lad 35% and RCA 10% on angiogram in 2016  Recent CMR stress test negative for ischemia  -no angina     2. Preserved LVEF     3. Hypoxia with diastolic heart failure/pulmonary hypertension and elevated wedge  -RV dysfunction  -continue torsemide 20mg/potassium 10meq twice daily  -daily weights  -he has stopped adding salt to foods; continue a low sodium diet  -elevate legs/support hose  - Lisinopril 5mg daily  -weight stable; edema better  -check BMP today        4. HTN  Controlled      5. No intracardiac shunt  Negative bubble study     6. Dilated aorta-mild 4.2cm  Continue to monitor by echo  Continue beta-blockers/ace  Echo in 6 months     7. Sleep apnea  Continue CPAP  -he has ongoing fatigue and has had his CPAP checked and states it is optimally set     8. Hyperlipidemia  Improved following increase of his Lipitor to 20mg daily        9. 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      10. Lower extremity venous disease with ulcerations  -venous ultrasound shows incompetent veins  -he has been seen by Dr Conde  Continue diuretics and support hose/torsemide        It has been a pleasure seeing Aiden Almaraz in follow up. I will call him with his labs and we will plan to see him in 6 months with repeat echo.    Micaela Mendoza, MSN, APRN-BC, CNP  Cardiology    Orders Placed This Encounter   Procedures     Basic metabolic panel     Basic metabolic panel     Lipid Profile     ALT     Follow-Up with Cardiologist     Echocardiogram Complete     Orders Placed This Encounter   Medications     torsemide (DEMADEX) 20 MG tablet     Sig: Take 1 tablet (20 mg) by mouth daily     Dispense:  90 tablet     Refill:  3     potassium chloride ER (KLOR-CON M) 10 MEQ CR tablet     Sig: Take 1 tablet (10 mEq) by mouth 2 times daily     Dispense:  180 tablet     Refill:  3     Medications  Discontinued During This Encounter   Medication Reason     torsemide (DEMADEX) 10 MG tablet Reorder     potassium chloride ER (KLOR-CON M) 10 MEQ CR tablet Reorder         Encounter Diagnoses   Name Primary?     Acute cor pulmonale (H)      Hypoxia      Mixed hyperlipidemia Yes       CURRENT MEDICATIONS:  Current Outpatient Medications   Medication Sig Dispense Refill     acetaminophen (TYLENOL) 500 MG tablet Take 500-1,000 mg by mouth every 6 hours as needed for mild pain       albuterol (PROAIR HFA, PROVENTIL HFA, VENTOLIN HFA) 108 (90 BASE) MCG/ACT inhaler Inhale 2 puffs into the lungs every 4 hours as needed for shortness of breath / dyspnea or wheezing       aspirin 81 MG tablet Take 1 tablet (81 mg) by mouth daily 30 tablet      atorvastatin (LIPITOR) 20 MG tablet Take 1 tablet (20 mg) by mouth daily 90 tablet 3     carvedilol (COREG) 6.25 MG tablet Take 1 tablet (6.25 mg) by mouth 2 times daily (with meals) 180 tablet 3     celecoxib (CELEBREX) 200 MG capsule Take 200 mg by mouth daily       cetirizine (ZYRTEC) 10 MG tablet Take 10 mg by mouth daily       colestipol (COLESTID) 1 G tablet Take 2 g by mouth 2 times daily Takes as needed for loose stools       Fluticasone Propionate (FLONASE NA) Spray 1 spray in nostril as needed       lisinopril (ZESTRIL) 5 MG tablet Take 1 tablet (5 mg) by mouth daily 90 tablet 3     meclizine 25 MG CHEW Take 25 mg by mouth every 6 hours as needed for dizziness       omeprazole (PRILOSEC) 20 MG DR capsule        potassium chloride ER (KLOR-CON M) 10 MEQ CR tablet Take 1 tablet (10 mEq) by mouth 2 times daily 180 tablet 3     sertraline (ZOLOFT) 100 MG tablet Take 100 mg by mouth daily       sertraline (ZOLOFT) 50 MG tablet Take 50 mg by mouth daily       tamsulosin (FLOMAX) 0.4 MG capsule Take 0.4 mg by mouth daily       torsemide (DEMADEX) 20 MG tablet Take 1 tablet (20 mg) by mouth daily 90 tablet 3     vitamin D3 (CHOLECALCIFEROL) 87787 UNITS capsule Take 1,000 Units by  mouth 2 times daily          ALLERGIES     Allergies   Allergen Reactions     Seasonal Allergies        PAST MEDICAL HISTORY:  Past Medical History:   Diagnosis Date     Corey's esophagus      CAD (coronary artery disease) 02/2016    mild, nonobstructive per cath 2016     Concussion 2016    fall went to ER--now with dizzy and memory issue     Degenerative disc disease, lumbar      Depression with anxiety      Diastolic heart failure (H)      Gastroesophageal reflux disease 1999    lap nissen at [facility] june 1999     HTN (hypertension)      Hyperlipidemia      IBS (irritable bowel syndrome)     on colestid for diarrhea not cholesterol     Impaired glucose tolerance      Lichen simplex chronicus      Metabolic syndrome      Mild ascending aorta dilation (H)     40mm     Osteomyelitis of right leg (H) 1979     Restrictive lung disease     Dr No, mild restriction PFT     Sleep apnea     cpap     Venous disease     lower extremity       PAST SURGICAL HISTORY:  Past Surgical History:   Procedure Laterality Date     CARPAL TUNNEL RELEASE RT/LT Bilateral      CHOLECYSTECTOMY       CV RIGHT HEART CATH Right 7/9/2020    Procedure: Right Heart Cath WITH FULL OXIMETRY;  Surgeon: Boaz Bustillo MD;  Location:  HEART CARDIAC CATH LAB     KNEE SURGERY      arthroscopic (pt doesn't recall which knee)     NISSEN FUNDOPLICATION       ROTATOR CUFF REPAIR RT/LT Right        FAMILY HISTORY:  Family History   Problem Relation Age of Onset     Other Cancer Mother      Hypertension Mother      Colon Cancer Father      Asthma Father        SOCIAL HISTORY:  Social History     Socioeconomic History     Marital status:      Spouse name: None     Number of children: None     Years of education: None     Highest education level: None   Occupational History     Occupation:      Comment: service food machines   Social Needs     Financial resource strain: None     Food insecurity     Worry: None      Inability: None     Transportation needs     Medical: None     Non-medical: None   Tobacco Use     Smoking status: Never Smoker     Smokeless tobacco: Never Used   Substance and Sexual Activity     Alcohol use: Yes     Alcohol/week: 0.0 standard drinks     Comment: occ     Drug use: None     Sexual activity: None   Lifestyle     Physical activity     Days per week: None     Minutes per session: None     Stress: None   Relationships     Social connections     Talks on phone: None     Gets together: None     Attends Orthodoxy service: None     Active member of club or organization: None     Attends meetings of clubs or organizations: None     Relationship status: None     Intimate partner violence     Fear of current or ex partner: None     Emotionally abused: None     Physically abused: None     Forced sexual activity: None   Other Topics Concern     Parent/sibling w/ CABG, MI or angioplasty before 65F 55M? Not Asked      Service Not Asked     Blood Transfusions Not Asked     Caffeine Concern No     Comment: occ     Occupational Exposure Not Asked     Hobby Hazards Not Asked     Sleep Concern Not Asked     Stress Concern Not Asked     Weight Concern Not Asked     Special Diet No     Back Care Not Asked     Exercise No     Bike Helmet Not Asked     Seat Belt Not Asked     Self-Exams Not Asked   Social History Narrative     None       Review of Systems:  Skin:  Negative       Eyes:  Positive for glasses    ENT:  Positive for sinus trouble allergies  Respiratory:  Positive for sleep apnea;CPAP;dyspnea on exertion;shortness of breath     Cardiovascular:    edema;Positive for;lightheadedness;dizziness    Gastroenterology: Positive for   gall bladder was removed  Genitourinary:  Positive for prostate problem    Musculoskeletal:  Positive for arthritis;neck pain;back pain    Neurologic:  Negative      Psychiatric:  Negative      Heme/Lymph/Imm:  Positive for allergies    Endocrine:  Negative        Physical  "Exam:  Vitals: /72 (BP Location: Right arm, Patient Position: Sitting, Cuff Size: Adult Large)   Pulse 68   Ht 1.778 m (5' 10\")   Wt 127.3 kg (280 lb 11.2 oz)   SpO2 97%   BMI 40.28 kg/m      Constitutional:  cooperative, alert and oriented, well developed, well nourished, in no acute distress        Skin:  warm and dry to the touch venous stasis changes   Weeping skin tears bilateral LE    Head:  normocephalic, no masses or lesions        Eyes:  pupils equal and round, conjunctivae and lids unremarkable, sclera white, no xanthalasma, EOMS intact, no nystagmus        Lymph:      ENT:  no pallor or cyanosis        Neck:           Respiratory:  normal symmetry         Cardiac: normal S1 and S2                pulses full and equal, no bruits auscultated                                   RFA cath site is normal    GI:  abdomen soft, non-tender, BS normoactive, no mass, no HSM, no bruits obese difficult exam with body habitus    Extremities and Muscular Skeletal:  no deformities, clubbing, cyanosis, erythema observed   trace trace;telangiectasia;varicose vein telangiectasia;varicose vein;trace healed venous ulcerations; 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    Neurological:  no gross motor deficits        Psych:  Alert and Oriented x 3      Recent Lab Results:  LIPID RESULTS:  Lab Results   Component Value Date    CHOL 120 11/10/2020    HDL 33 (L) 11/10/2020    LDL 46 11/10/2020    TRIG 206 (H) 11/10/2020       LIVER ENZYME RESULTS:  Lab Results   Component Value Date    ALT 33 11/10/2020       CBC RESULTS:  Lab Results   Component Value Date    WBC 6.9 07/09/2020    RBC 4.58 07/09/2020    HGB 14.2 07/09/2020    HCT 42.9 07/09/2020    MCV 94 07/09/2020    MCH 31.0 07/09/2020    MCHC 33.1 07/09/2020    RDW 13.7 07/09/2020     (L) 07/09/2020       BMP RESULTS:  Lab Results   Component Value Date     10/08/2020    POTASSIUM 3.8 10/08/2020    CHLORIDE 108 10/08/2020    " CO2 25 10/08/2020    ANIONGAP 6 10/08/2020     (H) 10/08/2020    BUN 25 10/08/2020    CR 1.10 10/08/2020    GFRESTIMATED 70 10/08/2020    GFRESTBLACK 81 10/08/2020    MARGA 8.3 (L) 10/08/2020        A1C RESULTS:  Lab Results   Component Value Date    A1C 5.6 01/26/2016       INR RESULTS:  Lab Results   Component Value Date    INR 1.09 07/09/2020    INR 1.09 02/01/2016           CC  No referring provider defined for this encounter.                    Thank you for allowing me to participate in the care of your patient.      Sincerely,     CHRISTIANE [facility]    cc:   No referring provider defined for this encounter.

## 2020-12-04 NOTE — LETTER
12/4/2020    Calvin Desai  [facility] [address] [facility] [facility] [address]    RE: Aiden lAmaraz       Dear Colleague,    I had the pleasure of seeing Aiden Almaraz in the [facility].    History of Present Illness:     Aiden Almaraz is a 65-year-old gentleman who is returning for follow up on sob and edema.     We saw him in 2016 for atypical chest pain.  Nuclear stress testing was abnormal likely due to body habitus.  A left heart cath was done showing a 35% LAD lesion with a normal ejection fraction.  He had mild aortic root enlargement at that point.     He has been seen by Dr. No from pulmonary and also has gone to [facility].  He wears a CPAP for his sleep apnea.  He has had a concussion in the past which has left him with some memory deficits and dizziness.     Recently, he was complaining of chest discomfort, palpitations, fatigue, and shortness of breath.  He reported to Dr. Mckeon that his resting O2 sat at his physician's office was 86%.  With deep breathing improved to 92%.  Previous pulmonary function test showed mild restrictive lung disease.  He reported to Dr. Mckeon that the lung clinic at [place] told him his lungs looked fine with no concerning issues. I reviewed the pulmonary records from [facility]. His blood tests were unremarkable.  A sniff test was normal.  Pulmonary function testing demonstrated normal spirometry and DLCO. TSH was borderline high but T4 so far was normal. He has sleep apnea and wears CPAP.     Due to palpitations and the above complaints he underwent an MRI stress test which was unremarkable for ischemia.  Echocardiogram with bubble study was ordered, but difficult because of body habitus.  There was perhaps some indication of basal inferolateral wall hypokinesis.  They reported the right ventricle as moderately dilated with decrease in function.  The aorta measured 40 mm at the base and 42 mm in the ascending portion.   The bubble study was suboptimal.     Based on this Dr. Mckeon sent the patient for transesophageal echocardiogram to rule out anomalous pulmonary venous return shunt and a right heart cath was done to assess for pulmonary hypertension.      His transesophageal echocardiogram notes LV function at 60%.  The RV is moderately dilated and reduced in function. There is no atrial shunt on bubble study and the ascending aorta is mildly dilated at 3.8 cm.  All 4 pulmonary veins appear dilated with normal velocity and flow.     Right heart cath:  Pulmonary artery systolic pressures 43/23 with a mean of 29.  Right atrial pressure is 16.  Pulmonary wedge pressure was between 20 and 23 consistent with elevated filling pressures consistent likely with diastolic dysfunction.  Diuresis was recommended so I switched his lasix to torsemide.      He added salt to his food which we talked about and he has effectively reduce this. He is now surprised how salty foods do taste if he eat high sodium choices.  He has started daily weights.  Since adding torsemide, he has dropped on home scale and here he is down from a peak weight of 287 to 280.           BMP on torsemide/Lisinopril showed creatinine elevation from 1.0 to 1.44; bun was up too but he was without hydration before that draw. I recheck this with hydration and creatinine was down to 1.10. He has gone back to 20mg of torsemide per day to keep his edema at bay so I will check a BMP again today.       Lipids on Lipitor 20mg daily: LDL 46 HDL 33   He takes colestid prn for loose stools        Exam: trace edema now on diuretics with venous stasis changes and healed ulceration left anterior calf. Ultrasound shows some venous incompetence. He saw Dr. Conde who felt his venous issues were likely from deep veins with reflux, body habitus; he suggested  20mg Torsemide for better control of his edema. He is back on 20mg daily and is doing well on this dose; edema is better  again    Lungs clear; abdomen obese  His venous stasis ulcers have healed with reduction in edema.          Impression/Plan:     1. CAD with lad 35% and RCA 10% on angiogram in 2016  Recent CMR stress test negative for ischemia  -no angina     2. Preserved LVEF     3. Hypoxia with diastolic heart failure/pulmonary hypertension and elevated wedge  -RV dysfunction  -continue torsemide 20mg/potassium 10meq twice daily  -daily weights  -he has stopped adding salt to foods; continue a low sodium diet  -elevate legs/support hose  - Lisinopril 5mg daily  -weight stable; edema better  -check BMP today        4. HTN  Controlled      5. No intracardiac shunt  Negative bubble study     6. Dilated aorta-mild 4.2cm  Continue to monitor by echo  Continue beta-blockers/ace  Echo in 6 months     7. Sleep apnea  Continue CPAP  -he has ongoing fatigue and has had his CPAP checked and states it is optimally set     8. Hyperlipidemia  Improved following increase of his Lipitor to 20mg daily        9. 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      10. Lower extremity venous disease with ulcerations  -venous ultrasound shows incompetent veins  -he has been seen by Dr Conde  Continue diuretics and support hose/torsemide        It has been a pleasure seeing Aiden Almaraz in follow up. I will call him with his labs and we will plan to see him in 6 months with repeat echo.    Micaela Mendoza, MSN, APRN-BC, CNP  Cardiology    Orders Placed This Encounter   Procedures     Basic metabolic panel     Basic metabolic panel     Lipid Profile     ALT     Follow-Up with Cardiologist     Echocardiogram Complete     Orders Placed This Encounter   Medications     torsemide (DEMADEX) 20 MG tablet     Sig: Take 1 tablet (20 mg) by mouth daily     Dispense:  90 tablet     Refill:  3     potassium chloride ER (KLOR-CON M) 10 MEQ CR tablet     Sig: Take 1 tablet (10 mEq) by mouth 2 times daily     Dispense:  180 tablet     Refill:  3     Medications  Discontinued During This Encounter   Medication Reason     torsemide (DEMADEX) 10 MG tablet Reorder     potassium chloride ER (KLOR-CON M) 10 MEQ CR tablet Reorder         Encounter Diagnoses   Name Primary?     Acute cor pulmonale (H)      Hypoxia      Mixed hyperlipidemia Yes       CURRENT MEDICATIONS:  Current Outpatient Medications   Medication Sig Dispense Refill     acetaminophen (TYLENOL) 500 MG tablet Take 500-1,000 mg by mouth every 6 hours as needed for mild pain       albuterol (PROAIR HFA, PROVENTIL HFA, VENTOLIN HFA) 108 (90 BASE) MCG/ACT inhaler Inhale 2 puffs into the lungs every 4 hours as needed for shortness of breath / dyspnea or wheezing       aspirin 81 MG tablet Take 1 tablet (81 mg) by mouth daily 30 tablet      atorvastatin (LIPITOR) 20 MG tablet Take 1 tablet (20 mg) by mouth daily 90 tablet 3     carvedilol (COREG) 6.25 MG tablet Take 1 tablet (6.25 mg) by mouth 2 times daily (with meals) 180 tablet 3     celecoxib (CELEBREX) 200 MG capsule Take 200 mg by mouth daily       cetirizine (ZYRTEC) 10 MG tablet Take 10 mg by mouth daily       colestipol (COLESTID) 1 G tablet Take 2 g by mouth 2 times daily Takes as needed for loose stools       Fluticasone Propionate (FLONASE NA) Spray 1 spray in nostril as needed       lisinopril (ZESTRIL) 5 MG tablet Take 1 tablet (5 mg) by mouth daily 90 tablet 3     meclizine 25 MG CHEW Take 25 mg by mouth every 6 hours as needed for dizziness       omeprazole (PRILOSEC) 20 MG DR capsule        potassium chloride ER (KLOR-CON M) 10 MEQ CR tablet Take 1 tablet (10 mEq) by mouth 2 times daily 180 tablet 3     sertraline (ZOLOFT) 100 MG tablet Take 100 mg by mouth daily       sertraline (ZOLOFT) 50 MG tablet Take 50 mg by mouth daily       tamsulosin (FLOMAX) 0.4 MG capsule Take 0.4 mg by mouth daily       torsemide (DEMADEX) 20 MG tablet Take 1 tablet (20 mg) by mouth daily 90 tablet 3     vitamin D3 (CHOLECALCIFEROL) 01121 UNITS capsule Take 1,000 Units by  mouth 2 times daily          ALLERGIES     Allergies   Allergen Reactions     Seasonal Allergies        PAST MEDICAL HISTORY:  Past Medical History:   Diagnosis Date     Corey's esophagus      CAD (coronary artery disease) 02/2016    mild, nonobstructive per cath 2016     Concussion 2016    fall went to ER--now with dizzy and memory issue     Degenerative disc disease, lumbar      Depression with anxiety      Diastolic heart failure (H)      Gastroesophageal reflux disease 1999    lap nissen at [facility] june 1999     HTN (hypertension)      Hyperlipidemia      IBS (irritable bowel syndrome)     on colestid for diarrhea not cholesterol     Impaired glucose tolerance      Lichen simplex chronicus      Metabolic syndrome      Mild ascending aorta dilation (H)     40mm     Osteomyelitis of right leg (H) 1979     Restrictive lung disease     Dr No, mild restriction PFT     Sleep apnea     cpap     Venous disease     lower extremity       PAST SURGICAL HISTORY:  Past Surgical History:   Procedure Laterality Date     CARPAL TUNNEL RELEASE RT/LT Bilateral      CHOLECYSTECTOMY       CV RIGHT HEART CATH Right 7/9/2020    Procedure: Right Heart Cath WITH FULL OXIMETRY;  Surgeon: Boaz Bustillo MD;  Location:  HEART CARDIAC CATH LAB     KNEE SURGERY      arthroscopic (pt doesn't recall which knee)     NISSEN FUNDOPLICATION       ROTATOR CUFF REPAIR RT/LT Right        FAMILY HISTORY:  Family History   Problem Relation Age of Onset     Other Cancer Mother      Hypertension Mother      Colon Cancer Father      Asthma Father        SOCIAL HISTORY:  Social History     Socioeconomic History     Marital status:      Spouse name: None     Number of children: None     Years of education: None     Highest education level: None   Occupational History     Occupation:      Comment: service food machines   Social Needs     Financial resource strain: None     Food insecurity     Worry: None      Inability: None     Transportation needs     Medical: None     Non-medical: None   Tobacco Use     Smoking status: Never Smoker     Smokeless tobacco: Never Used   Substance and Sexual Activity     Alcohol use: Yes     Alcohol/week: 0.0 standard drinks     Comment: occ     Drug use: None     Sexual activity: None   Lifestyle     Physical activity     Days per week: None     Minutes per session: None     Stress: None   Relationships     Social connections     Talks on phone: None     Gets together: None     Attends Jewish service: None     Active member of club or organization: None     Attends meetings of clubs or organizations: None     Relationship status: None     Intimate partner violence     Fear of current or ex partner: None     Emotionally abused: None     Physically abused: None     Forced sexual activity: None   Other Topics Concern     Parent/sibling w/ CABG, MI or angioplasty before 65F 55M? Not Asked      Service Not Asked     Blood Transfusions Not Asked     Caffeine Concern No     Comment: occ     Occupational Exposure Not Asked     Hobby Hazards Not Asked     Sleep Concern Not Asked     Stress Concern Not Asked     Weight Concern Not Asked     Special Diet No     Back Care Not Asked     Exercise No     Bike Helmet Not Asked     Seat Belt Not Asked     Self-Exams Not Asked   Social History Narrative     None       Review of Systems:  Skin:  Negative       Eyes:  Positive for glasses    ENT:  Positive for sinus trouble allergies  Respiratory:  Positive for sleep apnea;CPAP;dyspnea on exertion;shortness of breath     Cardiovascular:    edema;Positive for;lightheadedness;dizziness    Gastroenterology: Positive for   gall bladder was removed  Genitourinary:  Positive for prostate problem    Musculoskeletal:  Positive for arthritis;neck pain;back pain    Neurologic:  Negative      Psychiatric:  Negative      Heme/Lymph/Imm:  Positive for allergies    Endocrine:  Negative        Physical  "Exam:  Vitals: /72 (BP Location: Right arm, Patient Position: Sitting, Cuff Size: Adult Large)   Pulse 68   Ht 1.778 m (5' 10\")   Wt 127.3 kg (280 lb 11.2 oz)   SpO2 97%   BMI 40.28 kg/m      Constitutional:  cooperative, alert and oriented, well developed, well nourished, in no acute distress        Skin:  warm and dry to the touch venous stasis changes   Weeping skin tears bilateral LE    Head:  normocephalic, no masses or lesions        Eyes:  pupils equal and round, conjunctivae and lids unremarkable, sclera white, no xanthalasma, EOMS intact, no nystagmus        Lymph:      ENT:  no pallor or cyanosis        Neck:           Respiratory:  normal symmetry         Cardiac: normal S1 and S2                pulses full and equal, no bruits auscultated                                   RFA cath site is normal    GI:  abdomen soft, non-tender, BS normoactive, no mass, no HSM, no bruits obese difficult exam with body habitus    Extremities and Muscular Skeletal:  no deformities, clubbing, cyanosis, erythema observed   trace trace;telangiectasia;varicose vein telangiectasia;varicose vein;trace healed venous ulcerations; 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    Neurological:  no gross motor deficits        Psych:  Alert and Oriented x 3      Recent Lab Results:  LIPID RESULTS:  Lab Results   Component Value Date    CHOL 120 11/10/2020    HDL 33 (L) 11/10/2020    LDL 46 11/10/2020    TRIG 206 (H) 11/10/2020       LIVER ENZYME RESULTS:  Lab Results   Component Value Date    ALT 33 11/10/2020       CBC RESULTS:  Lab Results   Component Value Date    WBC 6.9 07/09/2020    RBC 4.58 07/09/2020    HGB 14.2 07/09/2020    HCT 42.9 07/09/2020    MCV 94 07/09/2020    MCH 31.0 07/09/2020    MCHC 33.1 07/09/2020    RDW 13.7 07/09/2020     (L) 07/09/2020       BMP RESULTS:  Lab Results   Component Value Date     10/08/2020    POTASSIUM 3.8 10/08/2020    CHLORIDE 108 10/08/2020    " CO2 25 10/08/2020    ANIONGAP 6 10/08/2020     (H) 10/08/2020    BUN 25 10/08/2020    CR 1.10 10/08/2020    GFRESTIMATED 70 10/08/2020    GFRESTBLACK 81 10/08/2020    MARGA 8.3 (L) 10/08/2020        A1C RESULTS:  Lab Results   Component Value Date    A1C 5.6 01/26/2016       INR RESULTS:  Lab Results   Component Value Date    INR 1.09 07/09/2020    INR 1.09 02/01/2016           Thank you for allowing me to participate in the care of your patient.    Sincerely,     CHRISTIANE [facility]

## 2020-12-04 NOTE — PATIENT INSTRUCTIONS
Continue Torsemide 20mg daily and two potassium pills    Check labs today    See us in 6 months with echo and labs

## 2020-12-04 NOTE — PROGRESS NOTES
Component      Latest Ref Rng & Units 9/15/2020 10/8/2020 12/4/2020   Sodium      133 - 144 mmol/L 137 139 138   Potassium      3.4 - 5.3 mmol/L 3.8 3.8 3.9   Chloride      94 - 109 mmol/L 106 108 105   Carbon Dioxide      20 - 32 mmol/L 26 25 31   Anion Gap      3 - 14 mmol/L 5 6 2 (L)   Glucose      70 - 99 mg/dL 149 (H) 140 (H) 142 (H)   Urea Nitrogen      7 - 30 mg/dL 38 (H) 25 35 (H)   Creatinine      0.66 - 1.25 mg/dL 1.44 (H) 1.10 1.30 (H)   GFR Estimate      >60 mL/min/1.73:m2 50 (L) 70 57 (L)   GFR Estimate If Black      >60 mL/min/1.73:m2 58 (L) 81 66   Calcium      8.5 - 10.1 mg/dL 8.7 8.3 (L) 9.2     Called and spoke with pt. Discussed that PATIENCE Paris reviewed labs done today and kidney function is up some on higher dose of torsemide, but she does not recommend any changes to torsemide dose at this time. Also discussed that his blood sugars have been consistently elevated on the last few lab checks and PATIENCE Paris recommends he follow up with his PCP for further investigation regarding possible diabetes. Discussed that attempted to add HgbA1c to his labs drawn today; however, unfortunately that lab requires a different color lab tube top that was not drawn today. Offered to call pt's PCP to discuss recent lab results and PATIENCE Paris's recommendation for PCP follow up and he agrees with this plan and will wait to hear from his PCP clinic.     Called and spoke with Keke at [facility] in [place] and requesting that pt's PCP, Dr. Desai, be notified of pt's elevated fasting blood sugars and reach out to pt to discuss further investigation of diabetes. Keke confirms the message will be sent to Dr. Desai's nurse for reivew. Left callback number for any questions/concerns.     ISABELL Guzmán 12:43 PM 12/4/2020

## 2021-03-04 ENCOUNTER — TRANSFERRED RECORDS (OUTPATIENT)
Dept: HEALTH INFORMATION MANAGEMENT | Facility: CLINIC | Age: 66
End: 2021-03-04

## 2021-03-22 ENCOUNTER — TRANSFERRED RECORDS (OUTPATIENT)
Dept: HEALTH INFORMATION MANAGEMENT | Facility: CLINIC | Age: 66
End: 2021-03-22

## 2021-03-27 ENCOUNTER — HEALTH MAINTENANCE LETTER (OUTPATIENT)
Age: 66
End: 2021-03-27

## 2021-04-07 ENCOUNTER — MEDICAL CORRESPONDENCE (OUTPATIENT)
Dept: HEALTH INFORMATION MANAGEMENT | Facility: CLINIC | Age: 66
End: 2021-04-07

## 2021-06-14 ENCOUNTER — HOSPITAL ENCOUNTER (OUTPATIENT)
Dept: CARDIOLOGY | Facility: CLINIC | Age: 66
Discharge: HOME | End: 2021-06-14
Attending: NURSE PRACTITIONER | Admitting: NURSE PRACTITIONER
Payer: COMMERCIAL

## 2021-06-14 DIAGNOSIS — I26.09 ACUTE COR PULMONALE (H): ICD-10-CM

## 2021-06-14 PROCEDURE — 255N000002 HC RX 255 OP 636: Performed by: NURSE PRACTITIONER

## 2021-06-14 PROCEDURE — 999N000208 ECHOCARDIOGRAM COMPLETE

## 2021-06-14 PROCEDURE — 93306 TTE W/DOPPLER COMPLETE: CPT | Mod: 26 | Performed by: INTERNAL MEDICINE

## 2021-06-14 RX ADMIN — HUMAN ALBUMIN MICROSPHERES AND PERFLUTREN 3 ML: 10; .22 INJECTION, SOLUTION INTRAVENOUS at 10:35

## 2021-06-20 NOTE — LETTER
Letter by Ning Lux RN at      Author: Ning Lux RN Service: -- Author Type: --    Filed:  Encounter Date: 7/12/2020 Status: (Other)       7/12/2020        Aiden Almaraz  [id number] [place] [id number]    This letter provides a written record that you were tested for COVID-19 on 7/7/20.     Your result was negative. This means that we didnt find the virus that causes COVID-19 in your sample. A test may show negative when you do actually have the virus. This can happen when the virus is in the early stages of infection, before you feel illness symptoms.    If you have symptoms   Stay home and away from others (self-isolate) until you meet ALL of the guidelines below:    Youve had no fever--and no medicine that reduces fever--for 3 full days (72 hours). And ?    Your other symptoms have gotten better. For example, your cough or breathing has improved. And?    At least 10 days have passed since your symptoms started.    During this time:    Stay home. Dont go to work, school or anywhere else.     Stay in your own room, including for meals. Use your own bathroom if you can.    Stay away from others in your home. No hugging, kissing or shaking hands. No visitors.    Clean high touch surfaces often (doorknobs, counters, handles, etc.). Use a household cleaning spray or wipes. You can find a full list on the EPA website at www.epa.gov/pesticide-registration/list-n-disinfectants-use-against-sars-cov-2.    Cover your mouth and nose with a mask, tissue or washcloth to avoid spreading germs.    Wash your hands and face often with soap and water.    Going back to work  Check with your employer for any guidelines to follow for going back to work.    Employers: This document serves as formal notice that your employee tested negative for COVID-19, as of the testing date shown above.

## 2021-06-21 ENCOUNTER — OFFICE VISIT (OUTPATIENT)
Dept: CARDIOLOGY | Facility: CLINIC | Age: 66
End: 2021-06-21
Attending: NURSE PRACTITIONER
Payer: COMMERCIAL

## 2021-06-21 VITALS
DIASTOLIC BLOOD PRESSURE: 64 MMHG | SYSTOLIC BLOOD PRESSURE: 98 MMHG | OXYGEN SATURATION: 98 % | HEIGHT: 70 IN | HEART RATE: 55 BPM | BODY MASS INDEX: 39.96 KG/M2 | WEIGHT: 279.1 LBS

## 2021-06-21 DIAGNOSIS — I10 BENIGN ESSENTIAL HYPERTENSION: ICD-10-CM

## 2021-06-21 DIAGNOSIS — E66.01 MORBID OBESITY (H): ICD-10-CM

## 2021-06-21 DIAGNOSIS — I26.09 ACUTE COR PULMONALE (H): ICD-10-CM

## 2021-06-21 DIAGNOSIS — E78.2 MIXED HYPERLIPIDEMIA: ICD-10-CM

## 2021-06-21 LAB
ALT SERPL W P-5'-P-CCNC: 34 U/L (ref 0–70)
ANION GAP SERPL CALCULATED.3IONS-SCNC: 5 MMOL/L (ref 3–14)
BUN SERPL-MCNC: 32 MG/DL (ref 7–30)
CALCIUM SERPL-MCNC: 8.8 MG/DL (ref 8.5–10.1)
CHLORIDE SERPL-SCNC: 107 MMOL/L (ref 94–109)
CHOLEST SERPL-MCNC: 106 MG/DL
CO2 SERPL-SCNC: 24 MMOL/L (ref 20–32)
CREAT SERPL-MCNC: 1.3 MG/DL (ref 0.66–1.25)
GFR SERPL CREATININE-BSD FRML MDRD: 57 ML/MIN/{1.73_M2}
GLUCOSE SERPL-MCNC: 131 MG/DL (ref 70–99)
HDLC SERPL-MCNC: 35 MG/DL
LDLC SERPL CALC-MCNC: 38 MG/DL
NONHDLC SERPL-MCNC: 71 MG/DL
POTASSIUM SERPL-SCNC: 4.2 MMOL/L (ref 3.4–5.3)
SODIUM SERPL-SCNC: 136 MMOL/L (ref 133–144)
TRIGL SERPL-MCNC: 164 MG/DL

## 2021-06-21 PROCEDURE — 84460 ALANINE AMINO (ALT) (SGPT): CPT | Performed by: NURSE PRACTITIONER

## 2021-06-21 PROCEDURE — 36415 COLL VENOUS BLD VENIPUNCTURE: CPT | Performed by: NURSE PRACTITIONER

## 2021-06-21 PROCEDURE — 80048 BASIC METABOLIC PNL TOTAL CA: CPT | Performed by: NURSE PRACTITIONER

## 2021-06-21 PROCEDURE — 99215 OFFICE O/P EST HI 40 MIN: CPT | Performed by: INTERNAL MEDICINE

## 2021-06-21 PROCEDURE — 80061 LIPID PANEL: CPT | Performed by: NURSE PRACTITIONER

## 2021-06-21 RX ORDER — CARVEDILOL 6.25 MG/1
3.12 TABLET ORAL 2 TIMES DAILY WITH MEALS
Qty: 180 TABLET | Refills: 3 | Status: ON HOLD | COMMUNITY
Start: 2021-06-21 | End: 2021-07-29

## 2021-06-21 RX ORDER — SPIRONOLACTONE 25 MG/1
25 TABLET ORAL DAILY
COMMUNITY
Start: 2021-03-29 | End: 2022-06-29

## 2021-06-21 ASSESSMENT — MIFFLIN-ST. JEOR: SCORE: 2052.24

## 2021-06-21 NOTE — PROGRESS NOTES
HPI and Plan:   See dictation    No orders of the defined types were placed in this encounter.    Orders Placed This Encounter   Medications     spironolactone (ALDACTONE) 25 MG tablet     Sig: Daily     carvedilol (COREG) 6.25 MG tablet     Sig: Take 0.5 tablets (3.125 mg) by mouth 2 times daily (with meals)     Dispense:  180 tablet     Refill:  3     Medications Discontinued During This Encounter   Medication Reason     lisinopril (ZESTRIL) 5 MG tablet      carvedilol (COREG) 6.25 MG tablet Reorder         Encounter Diagnoses   Name Primary?     Acute cor pulmonale (H)      Benign essential hypertension      Morbid obesity (H)        CURRENT MEDICATIONS:  Current Outpatient Medications   Medication Sig Dispense Refill     acetaminophen (TYLENOL) 500 MG tablet Take 500-1,000 mg by mouth every 6 hours as needed for mild pain       albuterol (PROAIR HFA, PROVENTIL HFA, VENTOLIN HFA) 108 (90 BASE) MCG/ACT inhaler Inhale 2 puffs into the lungs every 4 hours as needed for shortness of breath / dyspnea or wheezing       aspirin 81 MG tablet Take 1 tablet (81 mg) by mouth daily 30 tablet      atorvastatin (LIPITOR) 20 MG tablet Take 1 tablet (20 mg) by mouth daily 90 tablet 3     carvedilol (COREG) 6.25 MG tablet Take 0.5 tablets (3.125 mg) by mouth 2 times daily (with meals) 180 tablet 3     celecoxib (CELEBREX) 200 MG capsule Take 200 mg by mouth daily       cetirizine (ZYRTEC) 10 MG tablet Take 10 mg by mouth daily       colestipol (COLESTID) 1 G tablet Take 2 g by mouth 2 times daily Takes as needed for loose stools       Fluticasone Propionate (FLONASE NA) Spray 1 spray in nostril as needed       omeprazole (PRILOSEC) 20 MG DR capsule        potassium chloride ER (KLOR-CON M) 10 MEQ CR tablet Take 1 tablet (10 mEq) by mouth 2 times daily (Patient taking differently: Take 10 mEq by mouth 2 times daily Taking one pill) 180 tablet 3     sertraline (ZOLOFT) 100 MG tablet Take 100 mg by mouth daily       sertraline  (ZOLOFT) 50 MG tablet Take 50 mg by mouth daily       spironolactone (ALDACTONE) 25 MG tablet Daily       tamsulosin (FLOMAX) 0.4 MG capsule Take 0.8 mg by mouth daily        torsemide (DEMADEX) 20 MG tablet Take 1 tablet (20 mg) by mouth daily 90 tablet 3     vitamin D3 (CHOLECALCIFEROL) 24826 UNITS capsule Take 1,000 Units by mouth 2 times daily        meclizine 25 MG CHEW Take 25 mg by mouth every 6 hours as needed for dizziness         ALLERGIES     Allergies   Allergen Reactions     Seasonal Allergies        PAST MEDICAL HISTORY:  Past Medical History:   Diagnosis Date     Corey's esophagus      CAD (coronary artery disease) 02/2016    mild, nonobstructive per cath 2016     Concussion 2016    fall went to ER--now with dizzy and memory issue     Degenerative disc disease, lumbar      Depression with anxiety      Diastolic heart failure (H)      Gastroesophageal reflux disease 1999    lap nissen at [facility] june 1999     HTN (hypertension)      Hyperlipidemia      IBS (irritable bowel syndrome)     on colestid for diarrhea not cholesterol     Impaired glucose tolerance      Lichen simplex chronicus      Metabolic syndrome      Mild ascending aorta dilation (H)     40mm     Osteomyelitis of right leg (H) 1979     Restrictive lung disease     Dr No, mild restriction PFT     Sleep apnea     cpap     Venous disease     lower extremity       PAST SURGICAL HISTORY:  Past Surgical History:   Procedure Laterality Date     CARPAL TUNNEL RELEASE RT/LT Bilateral      CHOLECYSTECTOMY       CV RIGHT HEART CATH MEASUREMENTS RECORDED Right 7/9/2020    Procedure: Right Heart Cath WITH FULL OXIMETRY;  Surgeon: Boaz Bustillo MD;  Location:  HEART CARDIAC CATH LAB     KNEE SURGERY      arthroscopic (pt doesn't recall which knee)     NISSEN FUNDOPLICATION       ROTATOR CUFF REPAIR RT/LT Right        FAMILY HISTORY:  Family History   Problem Relation Age of Onset     Other Cancer Mother      Hypertension  Mother      Colon Cancer Father      Asthma Father        SOCIAL HISTORY:  Social History     Socioeconomic History     Marital status:      Spouse name: None     Number of children: None     Years of education: None     Highest education level: None   Occupational History     Occupation:      Comment: service food machines   Social Needs     Financial resource strain: None     Food insecurity     Worry: None     Inability: None     Transportation needs     Medical: None     Non-medical: None   Tobacco Use     Smoking status: Never Smoker     Smokeless tobacco: Never Used   Substance and Sexual Activity     Alcohol use: Yes     Alcohol/week: 0.0 standard drinks     Comment: occ     Drug use: None     Sexual activity: None   Lifestyle     Physical activity     Days per week: None     Minutes per session: None     Stress: None   Relationships     Social connections     Talks on phone: None     Gets together: None     Attends [employer] service: None     Active member of club or organization: None     Attends meetings of clubs or organizations: None     Relationship status: None     Intimate partner violence     Fear of current or ex partner: None     Emotionally abused: None     Physically abused: None     Forced sexual activity: None   Other Topics Concern     Parent/sibling w/ CABG, MI or angioplasty before 65F 55M? Not Asked      Service Not Asked     Blood Transfusions Not Asked     Caffeine Concern No     Comment: occ     Occupational Exposure Not Asked     Hobby Hazards Not Asked     Sleep Concern Not Asked     Stress Concern Not Asked     Weight Concern Not Asked     Special Diet No     Back Care Not Asked     Exercise No     Bike Helmet Not Asked     Seat Belt Not Asked     Self-Exams Not Asked   Social History Narrative     None       Review of Systems:  Skin:  Negative     Eyes:  Positive for glasses  ENT:  Positive for sinus trouble  Respiratory:  Positive for sleep  "apnea;CPAP;dyspnea on exertion;shortness of breath  Cardiovascular:    edema;Positive for;lightheadedness;dizziness;fatigue  Gastroenterology: Positive for heartburn  Genitourinary:  Positive for prostate problem  Musculoskeletal:  Positive for arthritis;neck pain;back pain  Neurologic:  Positive for numbness or tingling of feet  Psychiatric:  Positive for depression;anxiety  Heme/Lymph/Imm:  Positive for allergies  Endocrine:  Negative      Physical Exam:  Vitals: BP 98/64 (BP Location: Right arm, Patient Position: Sitting, Cuff Size: Adult Large)   Pulse 55   Ht 1.778 m (5' 10\")   Wt 126.6 kg (279 lb 1.6 oz)   SpO2 98%   BMI 40.05 kg/m      Constitutional:           Skin:             Head:           Eyes:           Lymph:      ENT:           Neck:           Respiratory:            Cardiac:                                                           GI:           Extremities and Muscular Skeletal:                 Neurological:           Psych:         Recent Lab Results:  LIPID RESULTS:  Lab Results   Component Value Date    CHOL 106 06/21/2021    HDL 35 (L) 06/21/2021    LDL 38 06/21/2021    TRIG 164 (H) 06/21/2021       LIVER ENZYME RESULTS:  Lab Results   Component Value Date    ALT 34 06/21/2021       CBC RESULTS:  Lab Results   Component Value Date    WBC 6.9 07/09/2020    RBC 4.58 07/09/2020    HGB 14.2 07/09/2020    HCT 42.9 07/09/2020    MCV 94 07/09/2020    MCH 31.0 07/09/2020    MCHC 33.1 07/09/2020    RDW 13.7 07/09/2020     (L) 07/09/2020       BMP RESULTS:  Lab Results   Component Value Date     06/21/2021    POTASSIUM 4.2 06/21/2021    CHLORIDE 107 06/21/2021    CO2 24 06/21/2021    ANIONGAP 5 06/21/2021     (H) 06/21/2021    BUN 32 (H) 06/21/2021    CR 1.30 (H) 06/21/2021    GFRESTIMATED 57 (L) 06/21/2021    GFRESTBLACK 66 06/21/2021    MARGA 8.8 06/21/2021        A1C RESULTS:  Lab Results   Component Value Date    A1C 5.6 01/26/2016       INR RESULTS:  Lab Results   Component " Value Date    INR 1.09 07/09/2020    INR 1.09 02/01/2016           CC  Anni Mendoza, APRN CNP  [id number]  [place] [id number]

## 2021-06-21 NOTE — LETTER
6/21/2021    Calvin Desai  [address] [address]    RE: Aiden Almaraz       Dear Colleague,    I had the pleasure of seeing Aiden Almaraz in the [facility].    HPI and Plan:   See dictation    No orders of the defined types were placed in this encounter.    Orders Placed This Encounter   Medications     spironolactone (ALDACTONE) 25 MG tablet     Sig: Daily     carvedilol (COREG) 6.25 MG tablet     Sig: Take 0.5 tablets (3.125 mg) by mouth 2 times daily (with meals)     Dispense:  180 tablet     Refill:  3     Medications Discontinued During This Encounter   Medication Reason     lisinopril (ZESTRIL) 5 MG tablet      carvedilol (COREG) 6.25 MG tablet Reorder         Encounter Diagnoses   Name Primary?     Acute cor pulmonale (H)      Benign essential hypertension      Morbid obesity (H)        CURRENT MEDICATIONS:  Current Outpatient Medications   Medication Sig Dispense Refill     acetaminophen (TYLENOL) 500 MG tablet Take 500-1,000 mg by mouth every 6 hours as needed for mild pain       albuterol (PROAIR HFA, PROVENTIL HFA, VENTOLIN HFA) 108 (90 BASE) MCG/ACT inhaler Inhale 2 puffs into the lungs every 4 hours as needed for shortness of breath / dyspnea or wheezing       aspirin 81 MG tablet Take 1 tablet (81 mg) by mouth daily 30 tablet      atorvastatin (LIPITOR) 20 MG tablet Take 1 tablet (20 mg) by mouth daily 90 tablet 3     carvedilol (COREG) 6.25 MG tablet Take 0.5 tablets (3.125 mg) by mouth 2 times daily (with meals) 180 tablet 3     celecoxib (CELEBREX) 200 MG capsule Take 200 mg by mouth daily       cetirizine (ZYRTEC) 10 MG tablet Take 10 mg by mouth daily       colestipol (COLESTID) 1 G tablet Take 2 g by mouth 2 times daily Takes as needed for loose stools       Fluticasone Propionate (FLONASE NA) Spray 1 spray in nostril as needed       omeprazole (PRILOSEC) 20 MG DR capsule        potassium  chloride ER (KLOR-CON M) 10 MEQ CR tablet Take 1 tablet (10 mEq) by mouth 2 times daily (Patient taking differently: Take 10 mEq by mouth 2 times daily Taking one pill) 180 tablet 3     sertraline (ZOLOFT) 100 MG tablet Take 100 mg by mouth daily       sertraline (ZOLOFT) 50 MG tablet Take 50 mg by mouth daily       spironolactone (ALDACTONE) 25 MG tablet Daily       tamsulosin (FLOMAX) 0.4 MG capsule Take 0.8 mg by mouth daily        torsemide (DEMADEX) 20 MG tablet Take 1 tablet (20 mg) by mouth daily 90 tablet 3     vitamin D3 (CHOLECALCIFEROL) 16558 UNITS capsule Take 1,000 Units by mouth 2 times daily        meclizine 25 MG CHEW Take 25 mg by mouth every 6 hours as needed for dizziness         ALLERGIES     Allergies   Allergen Reactions     Seasonal Allergies        PAST MEDICAL HISTORY:  Past Medical History:   Diagnosis Date     Corey's esophagus      CAD (coronary artery disease) 02/2016    mild, nonobstructive per cath 2016     Concussion 2016    fall went to ER--now with dizzy and memory issue     Degenerative disc disease, lumbar      Depression with anxiety      Diastolic heart failure (H)      Gastroesophageal reflux disease 1999    lap nissen at [facility] june 1999     HTN (hypertension)      Hyperlipidemia      IBS (irritable bowel syndrome)     on colestid for diarrhea not cholesterol     Impaired glucose tolerance      Lichen simplex chronicus      Metabolic syndrome      Mild ascending aorta dilation (H)     40mm     Osteomyelitis of right leg (H) 1979     Restrictive lung disease     Dr No, mild restriction PFT     Sleep apnea     cpap     Venous disease     lower extremity       PAST SURGICAL HISTORY:  Past Surgical History:   Procedure Laterality Date     CARPAL TUNNEL RELEASE RT/LT Bilateral      CHOLECYSTECTOMY       CV RIGHT HEART CATH MEASUREMENTS RECORDED Right 7/9/2020    Procedure: Right Heart Cath WITH FULL OXIMETRY;  Surgeon: Boaz Bustillo MD;  Location:   HEART CARDIAC CATH LAB     KNEE SURGERY      arthroscopic (pt doesn't recall which knee)     NISSEN FUNDOPLICATION       ROTATOR CUFF REPAIR RT/LT Right        FAMILY HISTORY:  Family History   Problem Relation Age of Onset     Other Cancer Mother      Hypertension Mother      Colon Cancer Father      Asthma Father        SOCIAL HISTORY:  Social History     Socioeconomic History     Marital status:      Spouse name: None     Number of children: None     Years of education: None     Highest education level: None   Occupational History     Occupation:      Comment: service food machines   Social Needs     Financial resource strain: None     Food insecurity     Worry: None     Inability: None     Transportation needs     Medical: None     Non-medical: None   Tobacco Use     Smoking status: Never Smoker     Smokeless tobacco: Never Used   Substance and Sexual Activity     Alcohol use: Yes     Alcohol/week: 0.0 standard drinks     Comment: occ     Drug use: None     Sexual activity: None   Lifestyle     Physical activity     Days per week: None     Minutes per session: None     Stress: None   Relationships     Social connections     Talks on phone: None     Gets together: None     Attends Rastafarian service: None     Active member of club or organization: None     Attends meetings of clubs or organizations: None     Relationship status: None     Intimate partner violence     Fear of current or ex partner: None     Emotionally abused: None     Physically abused: None     Forced sexual activity: None   Other Topics Concern     Parent/sibling w/ CABG, MI or angioplasty before 65F 55M? Not Asked      Service Not Asked     Blood Transfusions Not Asked     Caffeine Concern No     Comment: occ     Occupational Exposure Not Asked     Hobby Hazards Not Asked     Sleep Concern Not Asked     Stress Concern Not Asked     Weight Concern Not Asked     Special Diet No     Back Care Not Asked     Exercise  "No     Bike Helmet Not Asked     Seat Belt Not Asked     Self-Exams Not Asked   Social History Narrative     None       Review of Systems:  Skin:  Negative     Eyes:  Positive for glasses  ENT:  Positive for sinus trouble  Respiratory:  Positive for sleep apnea;CPAP;dyspnea on exertion;shortness of breath  Cardiovascular:    edema;Positive for;lightheadedness;dizziness;fatigue  Gastroenterology: Positive for heartburn  Genitourinary:  Positive for prostate problem  Musculoskeletal:  Positive for arthritis;neck pain;back pain  Neurologic:  Positive for numbness or tingling of feet  Psychiatric:  Positive for depression;anxiety  Heme/Lymph/Imm:  Positive for allergies  Endocrine:  Negative      Physical Exam:  Vitals: BP 98/64 (BP Location: Right arm, Patient Position: Sitting, Cuff Size: Adult Large)   Pulse 55   Ht 1.778 m (5' 10\")   Wt 126.6 kg (279 lb 1.6 oz)   SpO2 98%   BMI 40.05 kg/m      Constitutional:           Skin:             Head:           Eyes:           Lymph:      ENT:           Neck:           Respiratory:            Cardiac:                                                           GI:           Extremities and Muscular Skeletal:                 Neurological:           Psych:         Recent Lab Results:  LIPID RESULTS:  Lab Results   Component Value Date    CHOL 106 06/21/2021    HDL 35 (L) 06/21/2021    LDL 38 06/21/2021    TRIG 164 (H) 06/21/2021       LIVER ENZYME RESULTS:  Lab Results   Component Value Date    ALT 34 06/21/2021       CBC RESULTS:  Lab Results   Component Value Date    WBC 6.9 07/09/2020    RBC 4.58 07/09/2020    HGB 14.2 07/09/2020    HCT 42.9 07/09/2020    MCV 94 07/09/2020    MCH 31.0 07/09/2020    MCHC 33.1 07/09/2020    RDW 13.7 07/09/2020     (L) 07/09/2020       BMP RESULTS:  Lab Results   Component Value Date     06/21/2021    POTASSIUM 4.2 06/21/2021    CHLORIDE 107 06/21/2021    CO2 24 06/21/2021    ANIONGAP 5 06/21/2021     (H) 06/21/2021    " BUN 32 (H) 06/21/2021    CR 1.30 (H) 06/21/2021    GFRESTIMATED 57 (L) 06/21/2021    GFRESTBLACK 66 06/21/2021    MARGA 8.8 06/21/2021        A1C RESULTS:  Lab Results   Component Value Date    A1C 5.6 01/26/2016       INR RESULTS:  Lab Results   Component Value Date    INR 1.09 07/09/2020    INR 1.09 02/01/2016     Thank you for allowing me to participate in the care of your patient.      Sincerely,     Dago Mckeon MD     [facility]    cc:   CHRISTIANE Malin CNP  [address]  [place] [id number]

## 2021-06-22 NOTE — PROGRESS NOTES
Service Date: 06/21/2021    Aiden Almaraz is a delightful 65-year-old gentleman who returns for followup.  He has an ongoing history of blood pressure and fluid overload.  I suspect this is going to end up being diastolic dysfunction of the heart, but I will go through his history. Because of some atypical chest pain in 2016.  He had a left heart catheterization.  He had trivial coronary disease.  No narrowing was greater than 35%.  At that time, the left ventricular size and systolic function was normal.  The LVEDP was minimally elevated at 18 mmHg.  At that time, we had concern about metabolic syndrome, diabetes.  He was started on carvedilol and cholesterol medication and he was encouraged to do diet and exercise.  He did have a hemoglobin A1c, but it was more than 5 years ago and I do not know if he had one since.  I do not think Dr. Desai is in the Epic systems so I cannot find any recent ones. I talked to him a lot today about metabolic syndrome and how that can be associated with arterial stiffness and diastolic dysfunction of the heart.  Regarding his coronary arteries, he had an MRI stress test on 05/2020.  That test was negative for ischemia, showed normal LV size and function.  It did identify mild right ventricular enlargement, mild decreased RV systolic function.  Throughout all this the LV wall thickness was normal.  He has had a series of echocardiograms,  The last one was last week. They again report normal LV size and function, normal wall thickness.  They could not determine LV diastolic dysfunction.  It was indeterminate.  The left atrial size was normal but upper normal.  The right ventricle again is noted to be at least mildly dilated, and although imaging was poor, they thought the RV systolic function was mildly reduced.  They were not able to estimate right ventricular systolic pressure.  The aorta was very mildly dilated and the IVC was not well seen, so they could not estimate CVP.  He did  "have a right heart catheterization on 07/2020, which was abnormal.  It showed his wedge pressure was between 20 and 22, which is elevated.  The RV pressure was 41/14 with EDP of 19.  The PA pressure was 43/23 with a mean of 29 and the RA was quite elevated at 16.  At a minimum, that would suggest pulmonary hypertension of a mild degree due to \"post-capillary\" or left heart problems and primary treatment is diuretic.  He is now on torsemide and spironolactone and we did check his electrolyte panel and it was normal.  His creatinine is mildly elevated at 1.3, but that is not too far out of the realm of what he has had over the past year.  His ankle swelling is definitely improved.  He has been seen by Dr. Susan No from Pulmonary Medicine and also I believe at the [facility] and they all agree that he does not have any significant lung problems.  Dr. No's pulmonary function tests suggested mild restriction.  Obviously, there is a weight issue.  His BMI is 40.05, which is no doubt contributing to his weight and some of his fluid retention and blood sugar.  He does have a history of sleep apnea, on CPAP, which can also contribute to pulmonary hypertension.  He complains about fatigue.  He states his O2 sats 94% at rest.  I asked him to walk in the [place] at home since he has a home oximeter and make sure it stays above 88-90.  I explained again the concept of pre versus post-capillary pulmonary hypertension.  We talked about diastolic dysfunction of the heart and I used analogies of trying to blow into a larger balloon, etc.  In terms of dizziness, his blood pressure sitting was 98/64, but standing it was actually 74 systolic, and after 2 minutes it was 88. I think some of the fatigue might be coming from low blood pressure.  I am going to stop the lisinopril and cut the carvedilol down to 3.125 b.i.d.  I asked him again to check his blood pressure sitting and standing.  I asked him also to check his oxygen " saturation walking just to make sure it is normal.  I am going to order an MRI just to make sure there is no congenital shunts in the heart that we do not know about which would explain right heart enlargement.  For example, the transesophageal echo did not see any ASD.  They identified 4 pulmonary veins, but I want to make sure that there is not anomalous pulmonary venous return or any other defect that might explain why his right heart is enlarged.  Classically this degree of pulmonary hypertension s not enough to cause right heart to be enlarged and the LVEDP only being in the 20 range usually is not enough, so I want to make sure that we are not missing something.  Again, sleep apnea could be a part of the story.  I am not convinced that this is all post-capillary from diastolic dysfunction of the left ventricle and fluid overload.  I still think there might be some lung component despite the fact that the pulmonologist at [facility] found only mild abnormalities.  Again, I want to make sure he is not hypoxic throughout the day and I might send him back to the sleep doctor to make sure he is not hypoxic at night and make sure the sleep apnea is being well treated if we do not find anything more cardiac wise.  We reviewed today his electrolyte panel, his HDL LDL and triglycerides.  Again, the LDL is excellent, HDL is low, triglycerides are high.  This is classic metabolic syndrome.  The blood sugar is 131.  His primary doctor, if not recently done, may want to check a hemoglobin A1c.  I have encouraged diet and exercise.  I think that is going to be the mainstay of therapy.  We reviewed the echocardiogram that was done last week, and again, we will get an MRI and we will ask him to come back in about a month.  We will review the MRI results.  We will review his home oximetry when he goes for a walk and what results he got.  We will review his home orthostatic blood pressure numbers and we will, of course, repeat  them here.  If he is still fatigued and we do not find anything more,   he may want to talk to his internist about Zoloft.  Zoloft can cause somnolence and the dose is on the upper range of normal.    Today's visit was 1 hour.    cc:  Calvin Desai MD  [address] [place]  [address]    Dago Mckeon MD        D: 2021   T: 2021   MT: lamberto    Name:     MATY NGUYỄN  MRN:      [id number]        Account:      [id number]   :      1955           Service Date: 2021       Document: [id number]

## 2021-07-07 ENCOUNTER — HOSPITAL ENCOUNTER (OUTPATIENT)
Dept: CARDIOLOGY | Facility: CLINIC | Age: 66
Discharge: HOME | End: 2021-07-07
Attending: INTERNAL MEDICINE | Admitting: INTERNAL MEDICINE
Payer: COMMERCIAL

## 2021-07-07 DIAGNOSIS — I10 BENIGN ESSENTIAL HYPERTENSION: ICD-10-CM

## 2021-07-07 DIAGNOSIS — I26.09 ACUTE COR PULMONALE (H): ICD-10-CM

## 2021-07-07 DIAGNOSIS — E66.01 MORBID OBESITY (H): ICD-10-CM

## 2021-07-07 PROCEDURE — 75561 CARDIAC MRI FOR MORPH W/DYE: CPT

## 2021-07-07 PROCEDURE — A9585 GADOBUTROL INJECTION: HCPCS | Performed by: INTERNAL MEDICINE

## 2021-07-07 PROCEDURE — 71555 MRI ANGIO CHEST W OR W/O DYE: CPT

## 2021-07-07 PROCEDURE — 75561 CARDIAC MRI FOR MORPH W/DYE: CPT | Mod: 26 | Performed by: INTERNAL MEDICINE

## 2021-07-07 PROCEDURE — 71555 MRI ANGIO CHEST W OR W/O DYE: CPT | Mod: 26 | Performed by: INTERNAL MEDICINE

## 2021-07-07 PROCEDURE — 255N000002 HC RX 255 OP 636: Performed by: INTERNAL MEDICINE

## 2021-07-07 RX ORDER — GADOBUTROL 604.72 MG/ML
25 INJECTION INTRAVENOUS ONCE
Status: COMPLETED | OUTPATIENT
Start: 2021-07-07 | End: 2021-07-07

## 2021-07-07 RX ADMIN — GADOBUTROL 25 ML: 604.72 INJECTION INTRAVENOUS at 12:44

## 2021-07-15 ENCOUNTER — DOCUMENTATION ONLY (OUTPATIENT)
Dept: CARDIOLOGY | Facility: CLINIC | Age: 66
End: 2021-07-15

## 2021-07-16 ENCOUNTER — OFFICE VISIT (OUTPATIENT)
Dept: CARDIOLOGY | Facility: CLINIC | Age: 66
End: 2021-07-16
Attending: INTERNAL MEDICINE
Payer: COMMERCIAL

## 2021-07-16 VITALS
BODY MASS INDEX: 40.86 KG/M2 | SYSTOLIC BLOOD PRESSURE: 122 MMHG | OXYGEN SATURATION: 93 % | WEIGHT: 285.4 LBS | HEIGHT: 70 IN | DIASTOLIC BLOOD PRESSURE: 74 MMHG | HEART RATE: 90 BPM

## 2021-07-16 DIAGNOSIS — R06.09 DYSPNEA ON EXERTION: Primary | ICD-10-CM

## 2021-07-16 DIAGNOSIS — I26.09 ACUTE COR PULMONALE (H): ICD-10-CM

## 2021-07-16 DIAGNOSIS — Z11.59 ENCOUNTER FOR SCREENING FOR OTHER VIRAL DISEASES: ICD-10-CM

## 2021-07-16 DIAGNOSIS — E66.01 MORBID OBESITY (H): ICD-10-CM

## 2021-07-16 DIAGNOSIS — I10 BENIGN ESSENTIAL HYPERTENSION: ICD-10-CM

## 2021-07-16 PROCEDURE — 99215 OFFICE O/P EST HI 40 MIN: CPT | Performed by: NURSE PRACTITIONER

## 2021-07-16 RX ORDER — LOPERAMIDE HCL 2 MG
CAPSULE ORAL
COMMUNITY
Start: 2021-03-22 | End: 2022-02-16

## 2021-07-16 ASSESSMENT — MIFFLIN-ST. JEOR: SCORE: 2080.82

## 2021-07-16 NOTE — PROGRESS NOTES
History of Present Illness:    Aiden Almaraz is a 65 year old male followed here by Dr. Mckeno. He saw us originally for chest discomfort and abnormal nuclear stress test. He went on for left heart cath showing a 35% LAD lesion and enlarged aortic root.    He has been seen by Dr. No from [facility] and also has gone to [facility] and not found to have significant lung disease but previous PFT showed restrictive lung disease but normal spirometry at [place] and normal DLCO in 2020 with normal sniff test.  He wears a CPAP for his sleep apnea and his mask was assessed in  and it was optimally set.  He has had a concussion in the past which has left him with some memory deficits and dizziness.    Due to ongoing fatigue and SOB, he had a LUIS A which ruled out anomalous pulmonary venous return or shunt. RHC was done in 7-2020:    Pulmonary artery systolic pressures 43/23 with a mean of 29.  Right atrial pressure is 16.  Pulmonary wedge pressure was between 20 and 23 consistent with elevated filling pressures consistent likely with diastolic dysfunction.  Diuresis was recommended so I switched his lasix to torsemide.    He is on Lipitor for dyslipidemia and takes colestid for chronically loose stools.Lipids in June: total cholesterol 106 HDL 35 LDL 38     Due to ongoing concerns for fatigue and orthostasis with BP drop to 74 standing his coreg was reduced and Lisinopril was stopped at the last visit. BP has improved. O2 sats at home are mid 90's; here today 94% at rest and 93-94% with hallway walk.    He sent him for CMR to assess for shunt and to assess right heart which is chronically mildly reduced.    CMR:  Normal left  ventricular size and systolic function. Mildly dilated right ventricle with  mildly reduced systolic  function.   No evidence of significant intra-cardiac shunting on phase contrast analysis. Normal pulmonary venous  drainage.   Late gadolinium enhancement imaging demonstrates a small area  of mid-wall enhancement involving the mid  inferoseptal wall at  the RV insertion point. This is a non-specific pattern of enhancement that has been  described in the setting of pulmonary hypertension.    [place] is quite overweight with a  BMI of 40 which may be contributory and certainly diastolic dysfunction could play a role with his ongoing PRETTY; he endorses mild orthopnea. His edema is now mild but he complains of fatigue and ongoing dyspnea.    I have discussed this case in detail with Dr. Mckeon  Exam; lungs diminished but clear    Impression/Plan:     1. CAD with lad 35% and RCA 10% on angiogram in 2016  Recent CMR stress test negative for ischemia  -no angina     2. Preserved LVEF with diastolic dysfucntion     3. Ongoing PRETTY and mild orthopnea with MRI findings as above  diastolic dysfunction/pulmonary hypertension and elevated wedge  Possibly cor pulmonale  -we will plan a RHC at rest and with exercise  If this is unrevealing, he will see Pulmonary  Continue diruetics  Check ABG to see if he has hypoventilation and CO2 retention  Follow up after RHC  He knows to be NPO after midnight the day before the RHC and had transportation home    The risks and benefits of right heart catheterization with or without vasodilator study including risks of (0.1% -0.3%) bleeding, infection, death, arrhythmias, pulmonary artery rupture, etc were discussed with patient and he agrees to proceed with it.       4. HTN  Controlled now with less low numbers after meds reduced     5. No intracardiac shunt  Negative bubble study and negative MRI     6. Dilated aorta-mild 4.2cm-stable dating back to 2015  Continue to monitor by echo  Continue beta-blockers       7. Sleep apnea  Continue CPAP  -he has ongoing fatigue and has had his CPAP checked and it is optimally set per notes in care everywhere as of .     8. Hyperlipidemia  Improved following increase of his Lipitor to 20mg daily        9. 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      10. Lower extremity venous disease with ulcerations  -venous ultrasound shows incompetent veins  -he has been seen by Dr Conde  Continue support hose/torsemide    [facility] record; care everywhere sleep clinic records reviewed and discussed with patient and case discussed with Dr. Mckeon.    60 minutes spent on the date of the encounter doing chart review, review of outside records, review of test results, patient visit, documentation and discussion with other provider(s)     Micaela Mendoza, MSN, APRN-BC, CNP  Cardiology    Orders Placed This Encounter   Procedures     Blood gas arterial     Follow-Up with Cardiac Advanced Practice Provider     Orders Placed This Encounter   Medications     loperamide (IMODIUM) 2 MG capsule     There are no discontinued medications.      Encounter Diagnoses   Name Primary?     Acute cor pulmonale (H)      Benign essential hypertension      Morbid obesity (H)      Dyspnea on exertion Yes       CURRENT MEDICATIONS:  Current Outpatient Medications   Medication Sig Dispense Refill     acetaminophen (TYLENOL) 500 MG tablet Take 500-1,000 mg by mouth every 6 hours as needed for mild pain       albuterol (PROAIR HFA, PROVENTIL HFA, VENTOLIN HFA) 108 (90 BASE) MCG/ACT inhaler Inhale 2 puffs into the lungs every 4 hours as needed for shortness of breath / dyspnea or wheezing       aspirin 81 MG tablet Take 1 tablet (81 mg) by mouth daily 30 tablet      atorvastatin (LIPITOR) 20 MG tablet Take 1 tablet (20 mg) by mouth daily 90 tablet 3     carvedilol (COREG) 6.25 MG tablet Take 0.5 tablets (3.125 mg) by mouth 2 times daily (with meals) 180 tablet 3     celecoxib (CELEBREX) 200 MG capsule Take 200 mg by mouth daily       cetirizine (ZYRTEC) 10 MG tablet Take 10 mg by mouth daily       colestipol (COLESTID) 1 G tablet Take 2 g by mouth 2 times daily Takes as needed for loose stools       Fluticasone Propionate (FLONASE NA) Spray 1 spray in nostril as needed        meclizine 25 MG CHEW Take 25 mg by mouth every 6 hours as needed for dizziness       omeprazole (PRILOSEC) 20 MG DR capsule        potassium chloride ER (KLOR-CON M) 10 MEQ CR tablet Take 1 tablet (10 mEq) by mouth 2 times daily (Patient taking differently: Take 10 mEq by mouth 2 times daily Taking one pill) 180 tablet 3     sertraline (ZOLOFT) 100 MG tablet Take 100 mg by mouth daily       sertraline (ZOLOFT) 50 MG tablet Take 50 mg by mouth daily       spironolactone (ALDACTONE) 25 MG tablet Daily       tamsulosin (FLOMAX) 0.4 MG capsule Take 0.8 mg by mouth daily        torsemide (DEMADEX) 20 MG tablet Take 1 tablet (20 mg) by mouth daily 90 tablet 3     vitamin D3 (CHOLECALCIFEROL) 90784 UNITS capsule Take 1,000 Units by mouth 2 times daily        loperamide (IMODIUM) 2 MG capsule  (Patient not taking: Reported on 7/16/2021)         ALLERGIES     Allergies   Allergen Reactions     Seasonal Allergies        PAST MEDICAL HISTORY:  Past Medical History:   Diagnosis Date     Corey's esophagus      CAD (coronary artery disease) 02/2016    mild, nonobstructive per cath 2016     Concussion 2016    fall went to ER--now with dizzy and memory issue     Degenerative disc disease, lumbar      Depression with anxiety      Diastolic heart failure (H)      Gastroesophageal reflux disease 1999    lap nissen at [facility] june 1999     HTN (hypertension)      Hyperlipidemia      IBS (irritable bowel syndrome)     on colestid for diarrhea not cholesterol     Impaired glucose tolerance      Lichen simplex chronicus      Metabolic syndrome      Mild ascending aorta dilation (H)     40mm     Osteomyelitis of right leg (H) 1979     Restrictive lung disease     Dr No, mild restriction PFT     Sleep apnea     cpap     Venous disease     lower extremity       PAST SURGICAL HISTORY:  Past Surgical History:   Procedure Laterality Date     CARPAL TUNNEL RELEASE RT/LT Bilateral      CHOLECYSTECTOMY       CV RIGHT HEART CATH  MEASUREMENTS RECORDED Right 7/9/2020    Procedure: Right Heart Cath WITH FULL OXIMETRY;  Surgeon: Boaz Bustillo MD;  Location:  HEART CARDIAC CATH LAB     KNEE SURGERY      arthroscopic (pt doesn't recall which knee)     NISSEN FUNDOPLICATION       ROTATOR CUFF REPAIR RT/LT Right        FAMILY HISTORY:  Family History   Problem Relation Age of Onset     Other Cancer Mother      Hypertension Mother      Colon Cancer Father      Asthma Father        SOCIAL HISTORY:  Social History     Socioeconomic History     Marital status:      Spouse name: None     Number of children: None     Years of education: None     Highest education level: None   Occupational History     Occupation:      Comment: service food machines   Tobacco Use     Smoking status: Never Smoker     Smokeless tobacco: Never Used   Substance and Sexual Activity     Alcohol use: Yes     Alcohol/week: 0.0 standard drinks     Comment: occ     Drug use: None     Sexual activity: None   Other Topics Concern     Parent/sibling w/ CABG, MI or angioplasty before 65F 55M? Not Asked      Service Not Asked     Blood Transfusions Not Asked     Caffeine Concern No     Comment: occ     Occupational Exposure Not Asked     Hobby Hazards Not Asked     Sleep Concern Not Asked     Stress Concern Not Asked     Weight Concern Not Asked     Special Diet No     Back Care Not Asked     Exercise No     Bike Helmet Not Asked     Seat Belt Not Asked     Self-Exams Not Asked   Social History Narrative     None     Social Determinants of Health     Financial Resource Strain:      Difficulty of Paying Living Expenses:    Food Insecurity:      Worried About Running Out of Food in the Last Year:      Ran Out of Food in the Last Year:    Transportation Needs:      Lack of Transportation (Medical):      Lack of Transportation (Non-Medical):    Physical Activity:      Days of Exercise per Week:      Minutes of Exercise per Session:    Stress:       "Feeling of Stress :    Social Connections:      Frequency of Communication with Friends and Family:      Frequency of Social Gatherings with Friends and Family:      Attends [employer] Services:      Active Member of Clubs or Organizations:      Attends Club or Organization Meetings:      Marital Status:    Intimate Partner Violence:      Fear of Current or Ex-Partner:      Emotionally Abused:      Physically Abused:      Sexually Abused:        Review of Systems:  Skin:  Negative       Eyes:  Positive for glasses    ENT:  Positive for sinus trouble allergies  Respiratory:  Positive for sleep apnea;CPAP;dyspnea on exertion;shortness of breath     Cardiovascular:    edema;Positive for;lightheadedness;dizziness;fatigue    Gastroenterology: Positive for heartburn gall bladder was removed  Genitourinary:  Positive for prostate problem    Musculoskeletal:  Positive for arthritis;neck pain;back pain    Neurologic:  Positive for numbness or tingling of feet once in a while  Psychiatric:  Positive for depression;anxiety    Heme/Lymph/Imm:  Positive for allergies    Endocrine:  Negative        Physical Exam:  Vitals: /74 (BP Location: Right arm, Patient Position: Sitting, Cuff Size: Adult Regular)   Pulse 90   Ht 1.778 m (5' 10\")   Wt 129.5 kg (285 lb 6.4 oz)   SpO2 93%   BMI 40.95 kg/m      Constitutional:  cooperative, alert and oriented, well developed, well nourished, in no acute distress        Skin:  warm and dry to the touch venous stasis changes   Weeping skin tears bilateral LE    Head:  normocephalic, no masses or lesions        Eyes:  pupils equal and round, conjunctivae and lids unremarkable, sclera white, no xanthalasma, EOMS intact, no nystagmus        Lymph:      ENT:  no pallor or cyanosis        Neck:           Respiratory:  normal symmetry         Cardiac: normal S1 and S2                pulses full and equal, no bruits auscultated                                   RFA cath site is normal    GI:  " abdomen soft, non-tender, BS normoactive, no mass, no HSM, no bruits obese difficult exam with body habitus    Extremities and Muscular Skeletal:  no deformities, clubbing, cyanosis, erythema observed   trace trace;telangiectasia;varicose vein telangiectasia;varicose vein;trace healed venous ulcerations; 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    Neurological:  no gross motor deficits        Psych:  Alert and Oriented x 3      Recent Lab Results:  LIPID RESULTS:  Lab Results   Component Value Date    CHOL 106 06/21/2021    HDL 35 (L) 06/21/2021    LDL 38 06/21/2021    TRIG 164 (H) 06/21/2021       LIVER ENZYME RESULTS:  Lab Results   Component Value Date    ALT 34 06/21/2021       CBC RESULTS:  Lab Results   Component Value Date    WBC 6.9 07/09/2020    RBC 4.58 07/09/2020    HGB 14.2 07/09/2020    HCT 42.9 07/09/2020    MCV 94 07/09/2020    MCH 31.0 07/09/2020    MCHC 33.1 07/09/2020    RDW 13.7 07/09/2020     (L) 07/09/2020       BMP RESULTS:  Lab Results   Component Value Date     06/21/2021    POTASSIUM 4.2 06/21/2021    CHLORIDE 107 06/21/2021    CO2 24 06/21/2021    ANIONGAP 5 06/21/2021     (H) 06/21/2021    BUN 32 (H) 06/21/2021    CR 1.30 (H) 06/21/2021    GFRESTIMATED 57 (L) 06/21/2021    GFRESTBLACK 66 06/21/2021    MARGA 8.8 06/21/2021        A1C RESULTS:  Lab Results   Component Value Date    A1C 5.6 01/26/2016       INR RESULTS:  Lab Results   Component Value Date    INR 1.09 07/09/2020    INR 1.09 02/01/2016           CC  Dago Mckeon MD  [id number]  [place] [id number]

## 2021-07-16 NOTE — PROGRESS NOTES
I see him at [facility] Friday    He has had right heart enarlgement and phtn with diastolic dysfunction    We have done a complete work up but you wanted CMR to look for shunt and venous return  I do not think the forwarded this to you  He may need a sleep MD and need weight loss    Can you look at CMR  He had a RHC in past and wedge was 20-22 PA in 40's    CONCLUSIONS:  Normal left  ventricular size and systolic function. Mildly dilated right ventricle with  mildly reduced systolic  function.   No evidence of significant intra-cardiac shunting on phase contrast analysis. Normal pulmonary venous  drainage.   Late gadolinium enhancement imaging demonstrates a small area of mid-wall enhancement involving the mid  inferoseptal wall at  the RV insertion point. This is a non-specific pattern of enhancement that has been  described in the setting of pulmonary hypertension.    Think I need to send him to sleep clinic  thanks

## 2021-07-16 NOTE — LETTER
7/16/2021    Calvin Desai  [facility] [address] [address] [address]    RE: Aiden Almaraz       Dear Colleague,    I had the pleasure of seeing Aiden Almaraz in the [facility].    History of Present Illness:    Aiden Almaraz is a 65 year old male followed here by Dr. Mckeon. He saw us originally for chest discomfort and abnormal nuclear stress test. He went on for left heart cath showing a 35% LAD lesion and enlarged aortic root.    He has been seen by Dr. No from pulmonary and also has gone to [facility] and not found to have significant lung disease but previous PFT showed restrictive lung disease but normal spirometry at [place] and normal DLCO in 2020 with normal sniff test.  He wears a CPAP for his sleep apnea and his mask was assessed in  and it was optimally set.  He has had a concussion in the past which has left him with some memory deficits and dizziness.    Due to ongoing fatigue and SOB, he had a LUIS A which ruled out anomalous pulmonary venous return or shunt. RHC was done in 7-2020:    Pulmonary artery systolic pressures 43/23 with a mean of 29.  Right atrial pressure is 16.  Pulmonary wedge pressure was between 20 and 23 consistent with elevated filling pressures consistent likely with diastolic dysfunction.  Diuresis was recommended so I switched his lasix to torsemide.    He is on Lipitor for dyslipidemia and takes colestid for chronically loose stools.Lipids in June: total cholesterol 106 HDL 35 LDL 38     Due to ongoing concerns for fatigue and orthostasis with BP drop to 74 standing his coreg was reduced and Lisinopril was stopped at the last visit. BP has improved. O2 sats at home are mid 90's; here today 94% at rest and 93-94% with hallway walk.    He sent him for CMR to assess for shunt and to assess right heart which is chronically mildly reduced.    CMR:  Normal left  ventricular size and  systolic function. Mildly dilated right ventricle with  mildly reduced systolic  function.   No evidence of significant intra-cardiac shunting on phase contrast analysis. Normal pulmonary venous  drainage.   Late gadolinium enhancement imaging demonstrates a small area of mid-wall enhancement involving the mid  inferoseptal wall at  the RV insertion point. This is a non-specific pattern of enhancement that has been  described in the setting of pulmonary hypertension.    [place] is quite overweight with a  BMI of 40 which may be contributory and certainly diastolic dysfunction could play a role with his ongoing PRETTY; he endorses mild orthopnea. His edema is now mild but he complains of fatigue and ongoing dyspnea.    I have discussed this case in detail with Dr. Mckeon  Exam; lungs diminished but clear    Impression/Plan:     1. CAD with lad 35% and RCA 10% on angiogram in 2016  Recent CMR stress test negative for ischemia  -no angina     2. Preserved LVEF with diastolic dysfucntion     3. Ongoing PRETTY and mild orthopnea with MRI findings as above  diastolic dysfunction/pulmonary hypertension and elevated wedge  Possibly cor pulmonale  -we will plan a RHC at rest and with exercise  If this is unrevealing, he will see Pulmonary  Continue diruetics  Check ABG to see if he has hypoventilation and CO2 retention  Follow up after RHC  He knows to be NPO after midnight the day before the RHC and had transportation home    The risks and benefits of right heart catheterization with or without vasodilator study including risks of (0.1% -0.3%) bleeding, infection, death, arrhythmias, pulmonary artery rupture, etc were discussed with patient and he agrees to proceed with it.       4. HTN  Controlled now with less low numbers after meds reduced     5. No intracardiac shunt  Negative bubble study and negative MRI     6. Dilated aorta-mild 4.2cm-stable dating back to 2015  Continue to monitor by echo  Continue beta-blockers       7.  Sleep apnea  Continue CPAP  -he has ongoing fatigue and has had his CPAP checked and it is optimally set per notes in care everywhere as of .     8. Hyperlipidemia  Improved following increase of his Lipitor to 20mg daily        9. 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      10. Lower extremity venous disease with ulcerations  -venous ultrasound shows incompetent veins  -he has been seen by Dr Conde  Continue support hose/torsemide    [facility] record; care everywhere sleep clinic records reviewed and discussed with patient and case discussed with Dr. Mckeon.    60 minutes spent on the date of the encounter doing chart review, review of outside records, review of test results, patient visit, documentation and discussion with other provider(s)     Micaela Mendoza, MSN, APRN-BC, CNP  Cardiology    Orders Placed This Encounter   Procedures     Blood gas arterial     Follow-Up with Cardiac Advanced Practice Provider     Orders Placed This Encounter   Medications     loperamide (IMODIUM) 2 MG capsule     There are no discontinued medications.      Encounter Diagnoses   Name Primary?     Acute cor pulmonale (H)      Benign essential hypertension      Morbid obesity (H)      Dyspnea on exertion Yes       CURRENT MEDICATIONS:  Current Outpatient Medications   Medication Sig Dispense Refill     acetaminophen (TYLENOL) 500 MG tablet Take 500-1,000 mg by mouth every 6 hours as needed for mild pain       albuterol (PROAIR HFA, PROVENTIL HFA, VENTOLIN HFA) 108 (90 BASE) MCG/ACT inhaler Inhale 2 puffs into the lungs every 4 hours as needed for shortness of breath / dyspnea or wheezing       aspirin 81 MG tablet Take 1 tablet (81 mg) by mouth daily 30 tablet      atorvastatin (LIPITOR) 20 MG tablet Take 1 tablet (20 mg) by mouth daily 90 tablet 3     carvedilol (COREG) 6.25 MG tablet Take 0.5 tablets (3.125 mg) by mouth 2 times daily (with meals) 180 tablet 3     celecoxib (CELEBREX) 200 MG capsule Take 200  mg by mouth daily       cetirizine (ZYRTEC) 10 MG tablet Take 10 mg by mouth daily       colestipol (COLESTID) 1 G tablet Take 2 g by mouth 2 times daily Takes as needed for loose stools       Fluticasone Propionate (FLONASE NA) Spray 1 spray in nostril as needed       meclizine 25 MG CHEW Take 25 mg by mouth every 6 hours as needed for dizziness       omeprazole (PRILOSEC) 20 MG DR capsule        potassium chloride ER (KLOR-CON M) 10 MEQ CR tablet Take 1 tablet (10 mEq) by mouth 2 times daily (Patient taking differently: Take 10 mEq by mouth 2 times daily Taking one pill) 180 tablet 3     sertraline (ZOLOFT) 100 MG tablet Take 100 mg by mouth daily       sertraline (ZOLOFT) 50 MG tablet Take 50 mg by mouth daily       spironolactone (ALDACTONE) 25 MG tablet Daily       tamsulosin (FLOMAX) 0.4 MG capsule Take 0.8 mg by mouth daily        torsemide (DEMADEX) 20 MG tablet Take 1 tablet (20 mg) by mouth daily 90 tablet 3     vitamin D3 (CHOLECALCIFEROL) 48489 UNITS capsule Take 1,000 Units by mouth 2 times daily        loperamide (IMODIUM) 2 MG capsule  (Patient not taking: Reported on 7/16/2021)         ALLERGIES     Allergies   Allergen Reactions     Seasonal Allergies        PAST MEDICAL HISTORY:  Past Medical History:   Diagnosis Date     Corey's esophagus      CAD (coronary artery disease) 02/2016    mild, nonobstructive per cath 2016     Concussion 2016    fall went to ER--now with dizzy and memory issue     Degenerative disc disease, lumbar      Depression with anxiety      Diastolic heart failure (H)      Gastroesophageal reflux disease 1999    evangelina nissen at [facility] june 1999     HTN (hypertension)      Hyperlipidemia      IBS (irritable bowel syndrome)     on colestid for diarrhea not cholesterol     Impaired glucose tolerance      Lichen simplex chronicus      Metabolic syndrome      Mild ascending aorta dilation (H)     40mm     Osteomyelitis of right leg (H) 1979     Restrictive lung disease      Dr No, mild restriction PFT     Sleep apnea     cpap     Venous disease     lower extremity       PAST SURGICAL HISTORY:  Past Surgical History:   Procedure Laterality Date     CARPAL TUNNEL RELEASE RT/LT Bilateral      CHOLECYSTECTOMY       CV RIGHT HEART CATH MEASUREMENTS RECORDED Right 7/9/2020    Procedure: Right Heart Cath WITH FULL OXIMETRY;  Surgeon: Boaz Bustillo MD;  Location:  HEART CARDIAC CATH LAB     KNEE SURGERY      arthroscopic (pt doesn't recall which knee)     NISSEN FUNDOPLICATION       ROTATOR CUFF REPAIR RT/LT Right        FAMILY HISTORY:  Family History   Problem Relation Age of Onset     Other Cancer Mother      Hypertension Mother      Colon Cancer Father      Asthma Father        SOCIAL HISTORY:  Social History     Socioeconomic History     Marital status:      Spouse name: None     Number of children: None     Years of education: None     Highest education level: None   Occupational History     Occupation:      Comment: service food machines   Tobacco Use     Smoking status: Never Smoker     Smokeless tobacco: Never Used   Substance and Sexual Activity     Alcohol use: Yes     Alcohol/week: 0.0 standard drinks     Comment: occ     Drug use: None     Sexual activity: None   Other Topics Concern     Parent/sibling w/ CABG, MI or angioplasty before 65F 55M? Not Asked      Service Not Asked     Blood Transfusions Not Asked     Caffeine Concern No     Comment: occ     Occupational Exposure Not Asked     Hobby Hazards Not Asked     Sleep Concern Not Asked     Stress Concern Not Asked     Weight Concern Not Asked     Special Diet No     Back Care Not Asked     Exercise No     Bike Helmet Not Asked     Seat Belt Not Asked     Self-Exams Not Asked   Social History Narrative     None     Social Determinants of Health     Financial Resource Strain:      Difficulty of Paying Living Expenses:    Food Insecurity:      Worried About Running Out of Food in  "the Last Year:      Ran Out of Food in the Last Year:    Transportation Needs:      Lack of Transportation (Medical):      Lack of Transportation (Non-Medical):    Physical Activity:      Days of Exercise per Week:      Minutes of Exercise per Session:    Stress:      Feeling of Stress :    Social Connections:      Frequency of Communication with Friends and Family:      Frequency of Social Gatherings with Friends and Family:      Attends Tenriism Services:      Active Member of Clubs or Organizations:      Attends Club or Organization Meetings:      Marital Status:    Intimate Partner Violence:      Fear of Current or Ex-Partner:      Emotionally Abused:      Physically Abused:      Sexually Abused:        Review of Systems:  Skin:  Negative       Eyes:  Positive for glasses    ENT:  Positive for sinus trouble allergies  Respiratory:  Positive for sleep apnea;CPAP;dyspnea on exertion;shortness of breath     Cardiovascular:    edema;Positive for;lightheadedness;dizziness;fatigue    Gastroenterology: Positive for heartburn gall bladder was removed  Genitourinary:  Positive for prostate problem    Musculoskeletal:  Positive for arthritis;neck pain;back pain    Neurologic:  Positive for numbness or tingling of feet once in a while  Psychiatric:  Positive for depression;anxiety    Heme/Lymph/Imm:  Positive for allergies    Endocrine:  Negative        Physical Exam:  Vitals: /74 (BP Location: Right arm, Patient Position: Sitting, Cuff Size: Adult Regular)   Pulse 90   Ht 1.778 m (5' 10\")   Wt 129.5 kg (285 lb 6.4 oz)   SpO2 93%   BMI 40.95 kg/m      Constitutional:  cooperative, alert and oriented, well developed, well nourished, in no acute distress        Skin:  warm and dry to the touch venous stasis changes   Weeping skin tears bilateral LE    Head:  normocephalic, no masses or lesions        Eyes:  pupils equal and round, conjunctivae and lids unremarkable, sclera white, no xanthalasma, EOMS intact, no " nystagmus        Lymph:      ENT:  no pallor or cyanosis        Neck:           Respiratory:  normal symmetry         Cardiac: normal S1 and S2                pulses full and equal, no bruits auscultated                                   RFA cath site is normal    GI:  abdomen soft, non-tender, BS normoactive, no mass, no HSM, no bruits obese difficult exam with body habitus    Extremities and Muscular Skeletal:  no deformities, clubbing, cyanosis, erythema observed   trace trace;telangiectasia;varicose vein telangiectasia;varicose vein;trace healed venous ulcerations; 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    Neurological:  no gross motor deficits        Psych:  Alert and Oriented x 3      Recent Lab Results:  LIPID RESULTS:  Lab Results   Component Value Date    CHOL 106 06/21/2021    HDL 35 (L) 06/21/2021    LDL 38 06/21/2021    TRIG 164 (H) 06/21/2021       LIVER ENZYME RESULTS:  Lab Results   Component Value Date    ALT 34 06/21/2021       CBC RESULTS:  Lab Results   Component Value Date    WBC 6.9 07/09/2020    RBC 4.58 07/09/2020    HGB 14.2 07/09/2020    HCT 42.9 07/09/2020    MCV 94 07/09/2020    MCH 31.0 07/09/2020    MCHC 33.1 07/09/2020    RDW 13.7 07/09/2020     (L) 07/09/2020       BMP RESULTS:  Lab Results   Component Value Date     06/21/2021    POTASSIUM 4.2 06/21/2021    CHLORIDE 107 06/21/2021    CO2 24 06/21/2021    ANIONGAP 5 06/21/2021     (H) 06/21/2021    BUN 32 (H) 06/21/2021    CR 1.30 (H) 06/21/2021    GFRESTIMATED 57 (L) 06/21/2021    GFRESTBLACK 66 06/21/2021    MARGA 8.8 06/21/2021        A1C RESULTS:  Lab Results   Component Value Date    A1C 5.6 01/26/2016       INR RESULTS:  Lab Results   Component Value Date    INR 1.09 07/09/2020    INR 1.09 02/01/2016           CC  Dago Mckeon MD  [address]  [place] [id number]                    Thank you for allowing me to participate in the care of your patient.      Sincerely,      CHRISTIANE Malin CNP     [facility]  cc:   Dago Mckeon MD  [address]  [place] [phone]

## 2021-07-16 NOTE — PATIENT INSTRUCTIONS
Schedule RHC with exercise    Follow up visit after to review results    Schedule an arterial blood gas

## 2021-07-19 DIAGNOSIS — R06.09 DYSPNEA ON EXERTION: Primary | ICD-10-CM

## 2021-07-26 ENCOUNTER — LAB (OUTPATIENT)
Dept: LAB | Facility: CLINIC | Age: 66
End: 2021-07-26
Payer: COMMERCIAL

## 2021-07-26 DIAGNOSIS — Z11.59 ENCOUNTER FOR SCREENING FOR OTHER VIRAL DISEASES: ICD-10-CM

## 2021-07-26 PROCEDURE — U0005 INFEC AGEN DETEC AMPLI PROBE: HCPCS

## 2021-07-26 PROCEDURE — U0003 INFECTIOUS AGENT DETECTION BY NUCLEIC ACID (DNA OR RNA); SEVERE ACUTE RESPIRATORY SYNDROME CORONAVIRUS 2 (SARS-COV-2) (CORONAVIRUS DISEASE [COVID-19]), AMPLIFIED PROBE TECHNIQUE, MAKING USE OF HIGH THROUGHPUT TECHNOLOGIES AS DESCRIBED BY CMS-2020-01-R: HCPCS

## 2021-07-27 LAB — SARS-COV-2 RNA RESP QL NAA+PROBE: NEGATIVE

## 2021-07-28 DIAGNOSIS — R06.09 DYSPNEA ON EXERTION: Primary | ICD-10-CM

## 2021-07-28 RX ORDER — SODIUM CHLORIDE 9 MG/ML
INJECTION, SOLUTION INTRAVENOUS CONTINUOUS
Status: CANCELLED | OUTPATIENT
Start: 2021-07-28

## 2021-07-28 RX ORDER — LIDOCAINE 40 MG/G
CREAM TOPICAL
Status: CANCELLED | OUTPATIENT
Start: 2021-07-28

## 2021-07-28 RX ORDER — ASPIRIN 81 MG/1
243 TABLET, CHEWABLE ORAL ONCE
Status: CANCELLED | OUTPATIENT
Start: 2021-07-28

## 2021-07-28 RX ORDER — ASPIRIN 325 MG
325 TABLET ORAL ONCE
Status: CANCELLED | OUTPATIENT
Start: 2021-07-28 | End: 2021-07-28

## 2021-07-28 RX ORDER — POTASSIUM CHLORIDE 1500 MG/1
20 TABLET, EXTENDED RELEASE ORAL
Status: CANCELLED | OUTPATIENT
Start: 2021-07-28

## 2021-07-28 NOTE — PROGRESS NOTES
Wellness Screening Tool    Symptom Screening:    Do you have one of the following NEW symptoms:      Fever (subjective or >100.0)?  No    New cough? No    Shortness of breath? No    Chills? No    New loss of taste or smell? No    Generalized body aches? No    New persistent headache? No    New sore throat? No    Nausea, vomiting or diarrhea? No    Within the past 2 weeks, have you been exposed to someone with a known positive illness below?      COVID - 19 (known or suspected) No    Chicken pox?  No    Measles? No    Pertussis? No    Have you had a positive COVID-19 diagnostic test (nasal swab test) in the last 14 days or are you currently   on self-quarantine restrictions (i.e.travel restriction, exposure, etc?) No        Patient notified of visitor restriction: Yes  Patient informed to wear a mask: Yes    Patient's appointment status: Patient will be seen in clinic as scheduled on 7/29/21

## 2021-07-28 NOTE — PROGRESS NOTES
Called Pt he will take 325 mg aspirin or 4 baby aspirin tonight and tomorrow am. Pt will hold diuretic and potassium. Pt has  arrives 630 procedure 830. Will were mask, and one visitor with mask. Has 24 hour care taker after going home. NPO 6 hrs. Had neg covid test 7/26/21, no allergy to contrast and this is right heart cath little to no dye used. Outside possibly Pt can be bumped if emergency comes in door. RAEANN Quevedo RN

## 2021-07-29 ENCOUNTER — HOSPITAL ENCOUNTER (OUTPATIENT)
Facility: CLINIC | Age: 66
Discharge: HOME | End: 2021-07-29
Admitting: INTERNAL MEDICINE
Payer: COMMERCIAL

## 2021-07-29 VITALS
OXYGEN SATURATION: 93 % | HEART RATE: 55 BPM | HEIGHT: 70 IN | WEIGHT: 285.4 LBS | BODY MASS INDEX: 40.86 KG/M2 | DIASTOLIC BLOOD PRESSURE: 63 MMHG | TEMPERATURE: 98 F | RESPIRATION RATE: 16 BRPM | SYSTOLIC BLOOD PRESSURE: 104 MMHG

## 2021-07-29 DIAGNOSIS — G47.33 OBSTRUCTIVE SLEEP APNEA SYNDROME: ICD-10-CM

## 2021-07-29 DIAGNOSIS — I26.09 ACUTE COR PULMONALE (H): Primary | ICD-10-CM

## 2021-07-29 DIAGNOSIS — R06.09 DYSPNEA ON EXERTION: ICD-10-CM

## 2021-07-29 LAB
ANION GAP SERPL CALCULATED.3IONS-SCNC: 3 MMOL/L (ref 3–14)
BUN SERPL-MCNC: 35 MG/DL (ref 7–30)
CALCIUM SERPL-MCNC: 9.1 MG/DL (ref 8.5–10.1)
CHLORIDE BLD-SCNC: 107 MMOL/L (ref 94–109)
CO2 SERPL-SCNC: 28 MMOL/L (ref 20–32)
CREAT SERPL-MCNC: 1.41 MG/DL (ref 0.66–1.25)
ERYTHROCYTE [DISTWIDTH] IN BLOOD BY AUTOMATED COUNT: 13.7 % (ref 10–15)
GFR SERPL CREATININE-BSD FRML MDRD: 52 ML/MIN/1.73M2
GLUCOSE BLD-MCNC: 151 MG/DL (ref 70–99)
HCO3 BLDV-SCNC: 28 MMOL/L (ref 21–28)
HCO3 BLDV-SCNC: 28 MMOL/L (ref 21–28)
HCT VFR BLD AUTO: 42 % (ref 40–53)
HGB BLD-MCNC: 14.3 G/DL (ref 13.3–17.7)
INR PPP: 1.05 (ref 0.85–1.15)
LACTATE BLD-SCNC: 1 MMOL/L
LACTATE BLD-SCNC: 2.8 MMOL/L
MCH RBC QN AUTO: 31.4 PG (ref 26.5–33)
MCHC RBC AUTO-ENTMCNC: 34 G/DL (ref 31.5–36.5)
MCV RBC AUTO: 92 FL (ref 78–100)
PCO2 BLDV: 49 MM HG (ref 40–50)
PCO2 BLDV: 54 MM HG (ref 40–50)
PH BLDV: 7.33 [PH] (ref 7.32–7.43)
PH BLDV: 7.37 [PH] (ref 7.32–7.43)
PLATELET # BLD AUTO: 153 10E3/UL (ref 150–450)
PO2 BLDV: 31 MM HG (ref 25–47)
PO2 BLDV: 34 MM HG (ref 25–47)
POTASSIUM BLD-SCNC: 4 MMOL/L (ref 3.4–5.3)
RBC # BLD AUTO: 4.56 10E6/UL (ref 4.4–5.9)
SAO2 % BLDV: 53 % (ref 94–100)
SAO2 % BLDV: 63 % (ref 94–100)
SODIUM SERPL-SCNC: 138 MMOL/L (ref 133–144)
WBC # BLD AUTO: 8.4 10E3/UL (ref 4–11)

## 2021-07-29 PROCEDURE — 93464 EXERCISE W/HEMODYNAMIC MEAS: CPT | Mod: 26 | Performed by: INTERNAL MEDICINE

## 2021-07-29 PROCEDURE — 85027 COMPLETE CBC AUTOMATED: CPT | Performed by: INTERNAL MEDICINE

## 2021-07-29 PROCEDURE — 999N000071 HC STATISTIC HEART CATH LAB OR EP LAB

## 2021-07-29 PROCEDURE — 93005 ELECTROCARDIOGRAM TRACING: CPT

## 2021-07-29 PROCEDURE — 93464 EXERCISE W/HEMODYNAMIC MEAS: CPT | Performed by: INTERNAL MEDICINE

## 2021-07-29 PROCEDURE — 99153 MOD SED SAME PHYS/QHP EA: CPT | Performed by: INTERNAL MEDICINE

## 2021-07-29 PROCEDURE — 93451 RIGHT HEART CATH: CPT | Mod: 26 | Performed by: INTERNAL MEDICINE

## 2021-07-29 PROCEDURE — 99152 MOD SED SAME PHYS/QHP 5/>YRS: CPT | Mod: 59 | Performed by: INTERNAL MEDICINE

## 2021-07-29 PROCEDURE — 99152 MOD SED SAME PHYS/QHP 5/>YRS: CPT | Performed by: INTERNAL MEDICINE

## 2021-07-29 PROCEDURE — 82803 BLOOD GASES ANY COMBINATION: CPT | Mod: 91

## 2021-07-29 PROCEDURE — 93451 RIGHT HEART CATH: CPT | Performed by: INTERNAL MEDICINE

## 2021-07-29 PROCEDURE — 85610 PROTHROMBIN TIME: CPT | Performed by: INTERNAL MEDICINE

## 2021-07-29 PROCEDURE — 999N000184 HC STATISTIC TELEMETRY

## 2021-07-29 PROCEDURE — 250N000011 HC RX IP 250 OP 636: Performed by: INTERNAL MEDICINE

## 2021-07-29 PROCEDURE — 272N000001 HC OR GENERAL SUPPLY STERILE: Performed by: INTERNAL MEDICINE

## 2021-07-29 PROCEDURE — 80048 BASIC METABOLIC PNL TOTAL CA: CPT | Performed by: INTERNAL MEDICINE

## 2021-07-29 PROCEDURE — 36415 COLL VENOUS BLD VENIPUNCTURE: CPT | Performed by: INTERNAL MEDICINE

## 2021-07-29 PROCEDURE — 999N000054 HC STATISTIC EKG NON-CHARGEABLE

## 2021-07-29 PROCEDURE — 258N000003 HC RX IP 258 OP 636: Performed by: INTERNAL MEDICINE

## 2021-07-29 PROCEDURE — C1769 GUIDE WIRE: HCPCS | Performed by: INTERNAL MEDICINE

## 2021-07-29 RX ORDER — TAMSULOSIN HYDROCHLORIDE 0.4 MG/1
0.4 CAPSULE ORAL DAILY
Qty: 30 CAPSULE | Refills: 3 | Status: SHIPPED | OUTPATIENT
Start: 2021-07-29 | End: 2021-10-05

## 2021-07-29 RX ORDER — OXYCODONE HYDROCHLORIDE 5 MG/1
5 TABLET ORAL EVERY 4 HOURS PRN
Status: DISCONTINUED | OUTPATIENT
Start: 2021-07-29 | End: 2021-07-29 | Stop reason: HOSPADM

## 2021-07-29 RX ORDER — SODIUM CHLORIDE 9 MG/ML
INJECTION, SOLUTION INTRAVENOUS CONTINUOUS
Status: DISCONTINUED | OUTPATIENT
Start: 2021-07-29 | End: 2021-07-29 | Stop reason: HOSPADM

## 2021-07-29 RX ORDER — ATROPINE SULFATE 0.1 MG/ML
0.5 INJECTION INTRAVENOUS
Status: DISCONTINUED | OUTPATIENT
Start: 2021-07-29 | End: 2021-07-29 | Stop reason: HOSPADM

## 2021-07-29 RX ORDER — NALOXONE HYDROCHLORIDE 0.4 MG/ML
0.2 INJECTION, SOLUTION INTRAMUSCULAR; INTRAVENOUS; SUBCUTANEOUS
Status: DISCONTINUED | OUTPATIENT
Start: 2021-07-29 | End: 2021-07-29 | Stop reason: HOSPADM

## 2021-07-29 RX ORDER — LISINOPRIL 5 MG/1
5 TABLET ORAL DAILY
Qty: 30 TABLET | Refills: 3 | Status: SHIPPED | OUTPATIENT
Start: 2021-07-29 | End: 2022-06-29

## 2021-07-29 RX ORDER — POTASSIUM CHLORIDE 1500 MG/1
20 TABLET, EXTENDED RELEASE ORAL
Status: DISCONTINUED | OUTPATIENT
Start: 2021-07-29 | End: 2021-07-29 | Stop reason: HOSPADM

## 2021-07-29 RX ORDER — FENTANYL CITRATE 50 UG/ML
25 INJECTION, SOLUTION INTRAMUSCULAR; INTRAVENOUS
Status: DISCONTINUED | OUTPATIENT
Start: 2021-07-29 | End: 2021-07-29 | Stop reason: HOSPADM

## 2021-07-29 RX ORDER — NALOXONE HYDROCHLORIDE 0.4 MG/ML
0.4 INJECTION, SOLUTION INTRAMUSCULAR; INTRAVENOUS; SUBCUTANEOUS
Status: DISCONTINUED | OUTPATIENT
Start: 2021-07-29 | End: 2021-07-29 | Stop reason: HOSPADM

## 2021-07-29 RX ORDER — LIDOCAINE 40 MG/G
CREAM TOPICAL
Status: DISCONTINUED | OUTPATIENT
Start: 2021-07-29 | End: 2021-07-29 | Stop reason: HOSPADM

## 2021-07-29 RX ORDER — ASPIRIN 81 MG/1
243 TABLET, CHEWABLE ORAL ONCE
Status: DISCONTINUED | OUTPATIENT
Start: 2021-07-29 | End: 2021-07-29 | Stop reason: HOSPADM

## 2021-07-29 RX ORDER — FENTANYL CITRATE 50 UG/ML
INJECTION, SOLUTION INTRAMUSCULAR; INTRAVENOUS
Status: DISCONTINUED | OUTPATIENT
Start: 2021-07-29 | End: 2021-07-29 | Stop reason: HOSPADM

## 2021-07-29 RX ORDER — OXYCODONE HYDROCHLORIDE 5 MG/1
10 TABLET ORAL EVERY 4 HOURS PRN
Status: DISCONTINUED | OUTPATIENT
Start: 2021-07-29 | End: 2021-07-29 | Stop reason: HOSPADM

## 2021-07-29 RX ORDER — ASPIRIN 325 MG
325 TABLET ORAL ONCE
Status: DISCONTINUED | OUTPATIENT
Start: 2021-07-29 | End: 2021-07-29 | Stop reason: HOSPADM

## 2021-07-29 RX ORDER — ACETAMINOPHEN 325 MG/1
650 TABLET ORAL EVERY 4 HOURS PRN
Status: DISCONTINUED | OUTPATIENT
Start: 2021-07-29 | End: 2021-07-29 | Stop reason: HOSPADM

## 2021-07-29 RX ORDER — FLUMAZENIL 0.1 MG/ML
0.2 INJECTION, SOLUTION INTRAVENOUS
Status: DISCONTINUED | OUTPATIENT
Start: 2021-07-29 | End: 2021-07-29 | Stop reason: HOSPADM

## 2021-07-29 RX ORDER — HYDRALAZINE HYDROCHLORIDE 10 MG/1
10 TABLET, FILM COATED ORAL 3 TIMES DAILY
Qty: 90 TABLET | Refills: 3 | Status: SHIPPED | OUTPATIENT
Start: 2021-07-29 | End: 2021-08-27

## 2021-07-29 RX ADMIN — SODIUM CHLORIDE: 9 INJECTION, SOLUTION INTRAVENOUS at 07:20

## 2021-07-29 ASSESSMENT — MIFFLIN-ST. JEOR: SCORE: 2080.82

## 2021-07-29 NOTE — DISCHARGE INSTRUCTIONS
Cardiac Angiogram Discharge Instructions - Femoral Vein    After you go home:      Have an adult stay with you until tomorrow.    Drink extra fluids for 2 days.    You may resume your normal diet.    No smoking       For 24 hours - due to the sedation you received:    Relax and take it easy.    Do NOT make any important or legal decisions.    Do NOT drive or operate machines at home or at work.    Do NOT drink alcohol.    Care of Groin Puncture Site:      For the first 24 hrs - check the puncture site every 1-2 hours while awake.    For 2 days, when you cough, sneeze, laugh or move your bowels, hold your hand over the puncture site and press firmly.    Remove the bandaid after 24 hours. If there is minor oozing, apply another bandaid and remove it after 12 hours.    It is normal to have a small bruise or pea size lump at the site.    You may shower tomorrow. Do NOT take a bath, or use a hot tub or pool for at least 3 days. Do NOT scrub the site. Do not use lotion or powder near the puncture site.    Activity:            For 2 days:    No stooping or squatting    Do NOT do any heavy activity such as exercise, lifting, or straining.     No housework, yard work or any activity that make you sweat    Do NOT lift more than 10 pounds    Bleeding:      If you start bleeding from the site in your groin, lie down flat and press firmly on/above the site for 10 minutes.     Once bleeding stops, lay flat for 2 hours.     Call [facility] as soon as you can.       Call 911 right away if you have heavy bleeding or bleeding that does not stop.      Medicines:      Take your medications, including blood thinners, unless your provider tells you not to.      If you have stopped any medicines, check with your provider about when to restart them.    Follow Up Appointments:      Follow up with [facility] Nurse Practitioner at [facility] of patient preference in 7-10 days.    Call the clinic if:      You have increased pain or a  large or growing hard lump around the site.    The site is red, swollen, hot or tender.    Blood or fluid is draining from the site.    You have chills or a fever greater than 101 F (38 C).    Your leg feels numb, cool or changes color.    You have hives, a rash or unusual itching.    New pain in the back or belly that you cannot control with Tylenol.    Any questions or concerns.          [facility]:    [phone] UMP (7 days a week)

## 2021-07-29 NOTE — PRE-PROCEDURE
GENERAL PRE-PROCEDURE:   Procedure:  Right heart cath and exercise study  Date/Time:  7/29/2021 8:29 AM    Written consent obtained?: Yes    Risks and benefits: Risks, benefits and alternatives were discussed    Consent given by:  Patient  Patient states understanding of procedure being performed: Yes    Patient's understanding of procedure matches consent: Yes    Procedure consent matches procedure scheduled: Yes    Expected level of sedation:  Minimal  Appropriately NPO:  Yes  Mallampati  :  Grade 2- soft palate, base of uvula, tonsillar pillars, and portion of posterior pharyngeal wall visible  Lungs:  Lungs clear with good breath sounds bilaterally  Heart:  Normal heart sounds and rate  History & Physical reviewed:  History and physical reviewed and no updates needed  Statement of review:  I have reviewed the lab findings, diagnostic data, medications, and the plan for sedation  risk benefit of exercise rhc discussed with pt  He wishes to proceed  Noted inc constanza and glc

## 2021-07-29 NOTE — PROGRESS NOTES
Care Suites Discharge Nursing Note    Patient Information  Name: Aiden Almaraz  Age: 65 year old    Discharge Education:  Discharge instructions reviewed: Yes  Additional education/resources provided: AVS  Patient/patient representative verbalizes understanding: Yes  Patient discharging on new medications: Yes  Medication education completed: Yes    Discharge Plans:   Discharge location: home  Discharge ride contacted: Yes  Approximate discharge time: 1230    Discharge Criteria:  Discharge criteria met and vital signs stable: Yes    Patient Belongs:  Patient belongings returned to patient: Yes    Erum Brink RN

## 2021-07-29 NOTE — PROGRESS NOTES
Late Entry.  Care Suites Post Procedure Note    Patient Information  Name: Aiden Almaraz  Age: 65 year old    Post Procedure  Time patient returned to Care Suites: 1000  Concerns/abnormal assessment: none  If abnormal assessment, provider notified: N/A  Plan/Other: Right groin CDI, soft no bleeding, bruising, hematoma. VSS. 2+ pulses. Sleepy but awakens to voice, denies pain, wife at bedside.    Adrienne Perera RN

## 2021-07-29 NOTE — PROGRESS NOTES
PATIENT/VISITOR WELLNESS SCREENING    Step 1 Patient Screening    1. In the last month, have you been in contact with someone who was confirmed or suspected to have Coronavirus/COVID-19? No    2. Do you have the following symptoms?  Fever/Chills? No   Cough? No   Shortness of breath? No   New loss of taste or smell? No  Sore throat? No  Muscle or body aches? No  Headaches? No  Fatigue? No  Vomiting or diarrhea? No    Step 2 Visitor Screening    1. Name of Visitor (1 visitor per patient): Leeanne    2. In the last month, have you been in contact with someone who was confirmed or suspected to have Coronavirus/COVID-19? No    3. Do you have the following symptoms?  Fever/Chills? No   Cough? No   Shortness of breath? No   Skin rash? No   Loss of taste or smell? No  Sore throat? No  Runny or stuffy nose? No  Muscle or body aches? No  Headaches? No  Fatigue? No  Vomiting or diarrhea? No    If the visitor has positive symptoms, notify supervisor/manger  Per policy, the visitor will need to leave the facility     Step 3 Refer to logic grid below for actions    NO SYMPTOM(S)    ACTIONS:  1. Standard rooming process  2. Provider to assess per normal protocol  3. Implement precautions as needed and per guidelines     POSITIVE SYMPTOM(S)  If positive for ANY of the following symptoms: fever, cough, shortness of breath, rash    ACTION:  1. Continue to have the patient wear a mask   2. Room patient as soon as possible  3. Don appropriate PPE when entering room  4. Provider evaluation  Care Suites Admission Nursing Note    Patient Information  Name: Aiden Almaraz  Age: 65 year old  Reason for admission: Right heart cath  Care Suites arrival time: 0640    Visitor Information  Name: Leeanne  Informed of visitor restrictions: Yes  1 visitor allowed per patient   Visitor must screen negative for COVID symptoms   Visitor must wear a mask  Waiting rooms closed to visitors    Patient Admission/Assessment   Pre-procedure assessment  complete: Yes  If abnormal assessment/labs, provider notified: N/A  NPO: Yes  Medications held per instructions/orders: Yes  Consent: deferred  If applicable, pregnancy test status: deferred  Patient oriented to room: Yes  Education/questions answered: Yes  Plan/other: Discussed plan with pt and wife, questions answered.     Discharge Planning  Discharge name/phone number: Wife waiting in CS 1#.  Overnight post sedation caregiver: Leeanne  Discharge location: home    Adrienne Perera RN

## 2021-07-30 LAB
HCO3 BLDV-SCNC: 28 MMOL/L (ref 21–28)
LACTATE BLD-SCNC: 1.9 MMOL/L
PCO2 BLDV: 53 MM HG (ref 40–50)
PH BLDV: 7.34 [PH] (ref 7.32–7.43)
PO2 BLDV: 35 MM HG (ref 25–47)
SAO2 % BLDV: 63 % (ref 94–100)

## 2021-08-02 LAB
ATRIAL RATE - MUSE: 58 BPM
DIASTOLIC BLOOD PRESSURE - MUSE: NORMAL MMHG
INTERPRETATION ECG - MUSE: NORMAL
P AXIS - MUSE: 17 DEGREES
PR INTERVAL - MUSE: 142 MS
QRS DURATION - MUSE: 114 MS
QT - MUSE: 454 MS
QTC - MUSE: 445 MS
R AXIS - MUSE: 16 DEGREES
SYSTOLIC BLOOD PRESSURE - MUSE: NORMAL MMHG
T AXIS - MUSE: 23 DEGREES
VENTRICULAR RATE- MUSE: 58 BPM

## 2021-08-05 ENCOUNTER — HOSPITAL ENCOUNTER (OUTPATIENT)
Dept: RESPIRATORY THERAPY | Facility: CLINIC | Age: 66
Discharge: HOME | End: 2021-08-05
Attending: FAMILY MEDICINE | Admitting: FAMILY MEDICINE
Payer: MEDICARE

## 2021-08-05 DIAGNOSIS — I26.09 ACUTE COR PULMONALE (H): ICD-10-CM

## 2021-08-05 LAB
BASE EXCESS BLDA CALC-SCNC: -1 MMOL/L (ref -9–1.8)
HCO3 BLD-SCNC: 23 MMOL/L (ref 21–28)
O2/TOTAL GAS SETTING VFR VENT: 21 %
PCO2 BLD: 37 MM HG (ref 35–45)
PH BLD: 7.41 [PH] (ref 7.35–7.45)
PO2 BLD: 77 MM HG (ref 80–105)

## 2021-08-05 PROCEDURE — 36600 WITHDRAWAL OF ARTERIAL BLOOD: CPT

## 2021-08-05 PROCEDURE — 82803 BLOOD GASES ANY COMBINATION: CPT

## 2021-08-26 NOTE — PROGRESS NOTES
History of Present Illness:     Aiden Almaraz is a 65 year old male followed here by Dr. Mckeon. He returns today to follow up after a right heart cath for persistent shortness of breath and the med changes including discontinuation of Coreg, adding hydralazine, adding lisinopril and reducing flomax.    He saw us originally for chest discomfort and abnormal nuclear stress test. He went on for left heart cath showing a 35% LAD lesion and enlarged aortic root.     He has been seen by Dr. No from pulmonary and also has gone to [facility] and not found to have significant lung disease but previous PFT showed restrictive lung disease but normal spirometry at [place] and normal DLCO in 2020 with normal sniff test.  He wears a CPAP for his sleep apnea and his mask was assessed in  and it was optimally set.  He has had a concussion in the past which has left him with some memory deficits and dizziness. He states his sleep mask is followed by a sleep clinic in [place].     Due to ongoing fatigue and SOB, he had a LUIS A which ruled out anomalous pulmonary venous return or shunt. RHC was done in 7-2020:     Pulmonary artery systolic pressures 43/23 with a mean of 29.  Right atrial pressure is 16.  Pulmonary wedge pressure was between 20 and 23 consistent with elevated filling pressures consistent likely with diastolic dysfunction.  Diuresis was recommended so I switched his lasix to torsemide.     He is on Lipitor for dyslipidemia and takes colestid for chronically loose stools.Lipids in June: total cholesterol 106 HDL 35 LDL 38        He sent him for CMR to assess for shunt and to assess right heart which is chronically mildly reduced.     CMR:  Normal left  ventricular size and systolic function. Mildly dilated right ventricle with  mildly reduced systolic  function.   No evidence of significant intra-cardiac shunting on phase contrast analysis. Normal pulmonary venous  drainage.   Late gadolinium  "enhancement imaging demonstrates a small area of mid-wall enhancement involving the mid  inferoseptal wall at  the RV insertion point. This is a non-specific pattern of enhancement that has been  described in the setting of pulmonary hypertension.      He had a repeat right heart catheterization with exercise done the end of JulyRight sided filling pressures are mildly elevated.    Normal PA pressures.    Left sided filling pressures are mildly elevated.    Left ventricular filling pressures are mildly elevated.    Reduced cardiac output level.     RHC with exercise  (arm exercise lifting weights for 3 minutes per cycle)                     Baseline                            Low exercise      High exercise  HR         52                        58                        59  Ao         113/66                109/64                151/119  CO/CI    4.50/1.86            4.47/1.84            3.30/1.36  SV         86                        77                        56  PCW      17                        18                        20  PA         34/18/22            41/21/29            45/22/31  RV                                                                                             Post exercise 35/6/13  RA         13                        16                        11  SVR       15.76                   14.33                   36.40  PVR       1.11                     2.46                     3.34     INTERPRETATION\"  Difficult parameters. At rest the LV filling pressure is only mildly increased and the RV filling moderately increased. The CO is notably reduced and there appears to be at rest no significant pulm HTN.  With exercise there is little increase in HR (pt on BB for test) and the CO and Stroke Volume drop with exercise with mild increase in PVR but notable increase in SVR. Increase in systemic-SBP and mild increase in PA and PCW with exercise.     This may suggest \"ventriculo-arterial (V-A)\" coupling issue " "with \"stiff-non compliant\" systemic vasculature     REC-trial off beta blocker (to allow increased exercise HR and thus increased cardiac output), add low dose ACE and low dose Hydralazine as vasodilators. I will decrease his BPH/alpha blocker to allow more room for systemic vasodilators while trying to limit his known orthostatic hypotension from prior history.      Will get overnight oximetry to exclude hypoxia despite CPAP.       Camden appears frustrated and upset today about not feeling better. 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 He has been looking at his my chart results and insists that he has a low pO2 and high pCo2. I believe that part of what he is seeing are this blood gas results from PA blood run in the lab  We did an ABG and Pco2 is normal and Po2 is slightly low at 77. I tried multiple times to explain this to him today.    I walked him in the hallway and he does become dyspneic. His O2 sat never dropped below 92%. He feels fatigued and his family is telling him he is more forgetful and tired. He again refers back to his PA Pco2 about this.    His home BP seems to be somewhat fluctuant on the med change made. He can be 115 up to 140. Dr Mckeon left a parameter of holding hydralazine if under 85 and lisinopril if under 100 and he has not needed to do that. He has not had his BP cuff checked for accuracy and would like to take this to PCP which I support.    I ran a BMP today:soidum 140; potassium 4.2 BUN 26 Creatinine 1.23 TSH was done and is normal; fasting glucose is 134 and he states previous A1C at PCP has been normal but I do not have access to those results.    Impression/Plan:      1. CAD with lad 35% and RCA 10% on angiogram in 2016  Recent CMR stress test negative for ischemia  -no angina     2. Preserved LVEF with diastolic dysfucntion     3. Ongoing PRETTY and mild orthopnea with MRI findings as above  diastolic dysfunction/pulmonary " hypertension and elevated wedge  Possibly cor pulmonale  -this is a difficult situation  -he seems to be tolerating his increased vasodilators so now that I have labs back, I will have my nurse call him and attempt to advance hydralazine to 20mg tid; and increase torsemide to 20mg am 10mg pm  Will need BMP in 2-3 weeks and we can check his BP cuff here then for accuracy  -refer back to Pulmonary at [place]  -stay off of Coreg  -check overnight oximetry on CPAP   -continue lower dose Flomax  -the cause of his profound fatigue is not clear to me.  -I have arrange an office visit in 6 weeks     4. HTN  Controlled      5. No intracardiac shunt  Negative bubble study and negative MRI     6. Dilated aorta-mild 4.2cm-stable dating back to 2015  Continue to monitor by echo  Continue beta-blockers        7. Sleep apnea  Continue CPAP  -he has ongoing fatigue and has had his CPAP checked and it is optimally set per notes in care everywhere as of .   -check oximetry as noted    8. Hyperlipidemia  Improved following increase of his Lipitor to 20mg daily        9. 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      10. Lower extremity venous disease with ulcerations  -venous ultrasound shows incompetent veins  -he has been seen by Dr Conde  Continue support hose/torsemide        70 minutes spent on the date of the encounter doing chart review, review of test results, interpretation of tests, patient visit and documentation     All heart cath results, blood gas results reviewed in detail with the patient.      Micaela Mendoza, MSN, APRN-BC, CNP  Cardiology    Orders Placed This Encounter   Procedures     Basic metabolic panel     TSH with free T4 reflex     Follow-Up with Cardiologist     Overnight oximetry study     No orders of the defined types were placed in this encounter.    Medications Discontinued During This Encounter   Medication Reason     albuterol (PROAIR HFA, PROVENTIL HFA, VENTOLIN HFA) 108 (90 BASE)  MCG/ACT inhaler Medication Reconciliation Clean Up     Fluticasone Propionate (FLONASE NA) Medication Reconciliation Clean Up         Encounter Diagnoses   Name Primary?     Acute cor pulmonale (H)      Other fatigue      Dyspnea on exertion Yes       CURRENT MEDICATIONS:  Current Outpatient Medications   Medication Sig Dispense Refill     acetaminophen (TYLENOL) 500 MG tablet Take 500-1,000 mg by mouth every 6 hours as needed for mild pain       aspirin 81 MG tablet Take 1 tablet (81 mg) by mouth daily 30 tablet      atorvastatin (LIPITOR) 20 MG tablet Take 1 tablet (20 mg) by mouth daily 90 tablet 3     celecoxib (CELEBREX) 200 MG capsule Take 200 mg by mouth daily       cetirizine (ZYRTEC) 10 MG tablet Take 10 mg by mouth daily       colestipol (COLESTID) 1 G tablet Take 2 g by mouth 2 times daily Takes as needed for loose stools       hydrALAZINE (APRESOLINE) 10 MG tablet Take 2 tablets (20 mg) by mouth 3 times daily Hold systolic bp less than 85 180 tablet 11     lisinopril (ZESTRIL) 5 MG tablet Take 1 tablet (5 mg) by mouth daily Hold if Systolic Blood Pressure is less than 85. 30 tablet 3     meclizine 25 MG CHEW Take 25 mg by mouth every 6 hours as needed for dizziness       omeprazole (PRILOSEC) 20 MG DR capsule        potassium chloride ER (KLOR-CON M) 10 MEQ CR tablet Take 1 tablet (10 mEq) by mouth 2 times daily (Patient taking differently: Take 10 mEq by mouth 2 times daily Taking one pill) 180 tablet 3     sertraline (ZOLOFT) 100 MG tablet Take 100 mg by mouth daily       sertraline (ZOLOFT) 50 MG tablet Take 50 mg by mouth daily       spironolactone (ALDACTONE) 25 MG tablet Daily       tamsulosin (FLOMAX) 0.4 MG capsule Take 1 capsule (0.4 mg) by mouth daily 30 capsule 3     torsemide (DEMADEX) 20 MG tablet 20mg in am; 10mg at noon 135 tablet 3     vitamin D3 (CHOLECALCIFEROL) 23386 UNITS capsule Take 1,000 Units by mouth 2 times daily        loperamide (IMODIUM) 2 MG capsule  (Patient not taking:  Reported on 7/16/2021)         ALLERGIES     Allergies   Allergen Reactions     Seasonal Allergies        PAST MEDICAL HISTORY:  Past Medical History:   Diagnosis Date     Corey's esophagus      CAD (coronary artery disease) 02/2016    mild, nonobstructive per cath 2016     Concussion 2016    fall went to ER--now with dizzy and memory issue     Degenerative disc disease, lumbar      Depression with anxiety      Diastolic heart failure (H)      Gastroesophageal reflux disease 1999    lap nissen at [facility] june 1999     HTN (hypertension)      Hyperlipidemia      IBS (irritable bowel syndrome)     on colestid for diarrhea not cholesterol     Impaired glucose tolerance      Lichen simplex chronicus      Metabolic syndrome      Mild ascending aorta dilation (H)     40mm     Osteomyelitis of right leg (H) 1979     Restrictive lung disease     Dr No, mild restriction PFT     Sleep apnea     cpap     Venous disease     lower extremity       PAST SURGICAL HISTORY:  Past Surgical History:   Procedure Laterality Date     CARPAL TUNNEL RELEASE RT/LT Bilateral      CHOLECYSTECTOMY       CV RIGHT HEART CATH MEASUREMENTS RECORDED Right 7/9/2020    Procedure: Right Heart Cath WITH FULL OXIMETRY;  Surgeon: Boaz Bustillo MD;  Location:  HEART CARDIAC CATH LAB     CV RIGHT HEART CATH MEASUREMENTS RECORDED N/A 7/29/2021    Procedure: Right Heart Cath rest and exercise with elana in [place];  Surgeon: Dago Mckeon MD;  Location:  HEART CARDIAC CATH LAB     CV RIGHT HEART EXERCISE STRESS STUDY N/A 7/29/2021    Procedure: Right Heart Exercise Stress Study;  Surgeon: Dago Mckeon MD;  Location:  HEART CARDIAC CATH LAB     KNEE SURGERY      arthroscopic (pt doesn't recall which knee)     NISSEN FUNDOPLICATION       ROTATOR CUFF REPAIR RT/LT Right        FAMILY HISTORY:  Family History   Problem Relation Age of Onset     Other Cancer Mother      Hypertension Mother      Colon Cancer Father       Asthma Father        SOCIAL HISTORY:  Social History     Socioeconomic History     Marital status:      Spouse name: None     Number of children: None     Years of education: None     Highest education level: None   Occupational History     Occupation:      Comment: service food machines   Tobacco Use     Smoking status: Never Smoker     Smokeless tobacco: Never Used   Substance and Sexual Activity     Alcohol use: Yes     Alcohol/week: 0.0 standard drinks     Comment: occ     Drug use: None     Sexual activity: None   Other Topics Concern     Parent/sibling w/ CABG, MI or angioplasty before 65F 55M? Not Asked      Service Not Asked     Blood Transfusions Not Asked     Caffeine Concern No     Comment: occ     Occupational Exposure Not Asked     Hobby Hazards Not Asked     Sleep Concern Not Asked     Stress Concern Not Asked     Weight Concern Not Asked     Special Diet No     Back Care Not Asked     Exercise No     Bike Helmet Not Asked     Seat Belt Not Asked     Self-Exams Not Asked   Social History Narrative     None     Social Determinants of Health     Financial Resource Strain:      Difficulty of Paying Living Expenses:    Food Insecurity:      Worried About Running Out of Food in the Last Year:      Ran Out of Food in the Last Year:    Transportation Needs:      Lack of Transportation (Medical):      Lack of Transportation (Non-Medical):    Physical Activity:      Days of Exercise per Week:      Minutes of Exercise per Session:    Stress:      Feeling of Stress :    Social Connections:      Frequency of Communication with Friends and Family:      Frequency of Social Gatherings with Friends and Family:      Attends Anab[employer]ist Services:      Active Member of Clubs or Organizations:      Attends Club or Organization Meetings:      Marital Status:    Intimate Partner Violence:      Fear of Current or Ex-Partner:      Emotionally Abused:      Physically Abused:      Sexually Abused:  "       Review of Systems:  Skin:  Negative       Eyes:  Positive for glasses    ENT:  Positive for sinus trouble allergies  Respiratory:  Positive for sleep apnea;CPAP;dyspnea on exertion;shortness of breath;cough     Cardiovascular:    edema;Positive for;lightheadedness;dizziness;fatigue    Gastroenterology: Positive for heartburn treated  Genitourinary:  Positive for prostate problem    Musculoskeletal:  Positive for arthritis;neck pain;back pain shoulders, hands, knees; cramping in the hands  Neurologic:  Positive for numbness or tingling of feet once in a while  Psychiatric:  Positive for depression;anxiety    Heme/Lymph/Imm:  Positive for allergies    Endocrine:  Negative        Physical Exam:  Vitals: /70   Pulse 72   Ht 1.778 m (5' 10\")   Wt 128.4 kg (283 lb)   SpO2 93%   BMI 40.61 kg/m      Constitutional:  cooperative, alert and oriented, well developed, well nourished, in no acute distress        Skin:  warm and dry to the touch venous stasis changes   Weeping skin tears bilateral LE    Head:  normocephalic, no masses or lesions        Eyes:  pupils equal and round, conjunctivae and lids unremarkable, sclera white, no xanthalasma, EOMS intact, no nystagmus        Lymph:      ENT:  no pallor or cyanosis        Neck:           Respiratory:  normal symmetry         Cardiac: normal S1 and S2                pulses full and equal, no bruits auscultated                                        GI:  abdomen soft, non-tender, BS normoactive, no mass, no HSM, no bruits obese difficult exam with body habitus    Extremities and Muscular Skeletal:  no deformities, clubbing, cyanosis, erythema observed   trace trace;telangiectasia;varicose vein telangiectasia;varicose vein;trace      Neurological:  no gross motor deficits        Psych:  Alert and Oriented x 3      Recent Lab Results:  LIPID RESULTS:  Lab Results   Component Value Date    CHOL 106 06/21/2021    HDL 35 (L) 06/21/2021    LDL 38 06/21/2021    TRIG " 164 (H) 06/21/2021       LIVER ENZYME RESULTS:  Lab Results   Component Value Date    ALT 34 06/21/2021       CBC RESULTS:  Lab Results   Component Value Date    WBC 8.4 07/29/2021    WBC 6.9 07/09/2020    RBC 4.56 07/29/2021    RBC 4.58 07/09/2020    HGB 14.3 07/29/2021    HGB 14.2 07/09/2020    HCT 42.0 07/29/2021    HCT 42.9 07/09/2020    MCV 92 07/29/2021    MCV 94 07/09/2020    MCH 31.4 07/29/2021    MCH 31.0 07/09/2020    MCHC 34.0 07/29/2021    MCHC 33.1 07/09/2020    RDW 13.7 07/29/2021    RDW 13.7 07/09/2020     07/29/2021     (L) 07/09/2020       BMP RESULTS:  Lab Results   Component Value Date     08/27/2021     06/21/2021    POTASSIUM 4.2 08/27/2021    POTASSIUM 4.2 06/21/2021    CHLORIDE 108 08/27/2021    CHLORIDE 107 06/21/2021    CO2 25 08/27/2021    CO2 24 06/21/2021    ANIONGAP 7 08/27/2021    ANIONGAP 5 06/21/2021     (H) 08/27/2021     (H) 06/21/2021    BUN 26 08/27/2021    BUN 32 (H) 06/21/2021    CR 1.23 08/27/2021    CR 1.30 (H) 06/21/2021    GFRESTIMATED 61 08/27/2021    GFRESTIMATED 57 (L) 06/21/2021    GFRESTBLACK 66 06/21/2021    MARGA 8.7 08/27/2021    MARGA 8.8 06/21/2021        A1C RESULTS:  Lab Results   Component Value Date    A1C 5.6 01/26/2016       INR RESULTS:  Lab Results   Component Value Date    INR 1.05 07/29/2021    INR 1.09 07/09/2020    INR 1.09 02/01/2016           CC  Anni Mendoza, APRN CNP  [id number]  [place]  [place] [id number]

## 2021-08-27 ENCOUNTER — OFFICE VISIT (OUTPATIENT)
Dept: CARDIOLOGY | Facility: CLINIC | Age: 66
End: 2021-08-27
Attending: NURSE PRACTITIONER
Payer: MEDICARE

## 2021-08-27 ENCOUNTER — LAB (OUTPATIENT)
Dept: LAB | Facility: CLINIC | Age: 66
End: 2021-08-27
Payer: MEDICARE

## 2021-08-27 ENCOUNTER — DOCUMENTATION ONLY (OUTPATIENT)
Dept: CARDIOLOGY | Facility: CLINIC | Age: 66
End: 2021-08-27

## 2021-08-27 VITALS
WEIGHT: 283 LBS | BODY MASS INDEX: 40.52 KG/M2 | HEIGHT: 70 IN | HEART RATE: 72 BPM | OXYGEN SATURATION: 93 % | DIASTOLIC BLOOD PRESSURE: 70 MMHG | SYSTOLIC BLOOD PRESSURE: 120 MMHG

## 2021-08-27 DIAGNOSIS — R53.83 OTHER FATIGUE: ICD-10-CM

## 2021-08-27 DIAGNOSIS — R06.09 DYSPNEA ON EXERTION: Primary | ICD-10-CM

## 2021-08-27 DIAGNOSIS — I26.09 ACUTE COR PULMONALE (H): ICD-10-CM

## 2021-08-27 DIAGNOSIS — R06.09 DYSPNEA ON EXERTION: ICD-10-CM

## 2021-08-27 LAB
ANION GAP SERPL CALCULATED.3IONS-SCNC: 7 MMOL/L (ref 3–14)
BUN SERPL-MCNC: 26 MG/DL (ref 7–30)
CALCIUM SERPL-MCNC: 8.7 MG/DL (ref 8.5–10.1)
CHLORIDE BLD-SCNC: 108 MMOL/L (ref 94–109)
CO2 SERPL-SCNC: 25 MMOL/L (ref 20–32)
CREAT SERPL-MCNC: 1.23 MG/DL (ref 0.66–1.25)
GFR SERPL CREATININE-BSD FRML MDRD: 61 ML/MIN/1.73M2
GLUCOSE BLD-MCNC: 134 MG/DL (ref 70–99)
POTASSIUM BLD-SCNC: 4.2 MMOL/L (ref 3.4–5.3)
SODIUM SERPL-SCNC: 140 MMOL/L (ref 133–144)
TSH SERPL DL<=0.005 MIU/L-ACNC: 2.95 MU/L (ref 0.4–4)

## 2021-08-27 PROCEDURE — 36415 COLL VENOUS BLD VENIPUNCTURE: CPT | Performed by: NURSE PRACTITIONER

## 2021-08-27 PROCEDURE — 84443 ASSAY THYROID STIM HORMONE: CPT | Performed by: NURSE PRACTITIONER

## 2021-08-27 PROCEDURE — 99215 OFFICE O/P EST HI 40 MIN: CPT | Performed by: NURSE PRACTITIONER

## 2021-08-27 PROCEDURE — 80048 BASIC METABOLIC PNL TOTAL CA: CPT | Performed by: NURSE PRACTITIONER

## 2021-08-27 PROCEDURE — 99417 PROLNG OP E/M EACH 15 MIN: CPT | Performed by: NURSE PRACTITIONER

## 2021-08-27 RX ORDER — TORSEMIDE 20 MG/1
TABLET ORAL
Qty: 135 TABLET | Refills: 3 | Status: SHIPPED | OUTPATIENT
Start: 2021-08-27 | End: 2021-10-05

## 2021-08-27 RX ORDER — HYDRALAZINE HYDROCHLORIDE 10 MG/1
20 TABLET, FILM COATED ORAL 3 TIMES DAILY
Qty: 180 TABLET | Refills: 11 | Status: SHIPPED | OUTPATIENT
Start: 2021-08-27 | End: 2022-02-16

## 2021-08-27 ASSESSMENT — MIFFLIN-ST. JEOR: SCORE: 2069.93

## 2021-08-27 NOTE — LETTER
8/27/2021    Calvin Desai  [facility]  [facility] [address]    RE: Aiden Almaraz       Dear Colleague,    I had the pleasure of seeing Aiden Almaraz in the [facility].      History of Present Illness:     Aiden Almaraz is a 65 year old male followed here by Dr. Mckeon. He returns today to follow up after a right heart cath for persistent shortness of breath and the med changes including discontinuation of Coreg, adding hydralazine, adding lisinopril and reducing flomax.    He saw us originally for chest discomfort and abnormal nuclear stress test. He went on for left heart cath showing a 35% LAD lesion and enlarged aortic root.     He has been seen by Dr. No from pulmonary and also has gone to [facility] and not found to have significant lung disease but previous PFT showed restrictive lung disease but normal spirometry at [facility] and normal DLCO in 2020 with normal sniff test.  He wears a CPAP for his sleep apnea and his mask was assessed in  and it was optimally set.  He has had a concussion in the past which has left him with some memory deficits and dizziness. He states his sleep mask is followed by a sleep clinic in [place].     Due to ongoing fatigue and SOB, he had a LUIS A which ruled out anomalous pulmonary venous return or shunt. RHC was done in 7-2020:     Pulmonary artery systolic pressures 43/23 with a mean of 29.  Right atrial pressure is 16.  Pulmonary wedge pressure was between 20 and 23 consistent with elevated filling pressures consistent likely with diastolic dysfunction.  Diuresis was recommended so I switched his lasix to torsemide.     He is on Lipitor for dyslipidemia and takes colestid for chronically loose stools.Lipids in June: total cholesterol 106 HDL 35 LDL 38        He sent him for CMR to assess for shunt and to assess right heart which is chronically mildly  "reduced.     CMR:  Normal left  ventricular size and systolic function. Mildly dilated right ventricle with  mildly reduced systolic  function.   No evidence of significant intra-cardiac shunting on phase contrast analysis. Normal pulmonary venous  drainage.   Late gadolinium enhancement imaging demonstrates a small area of mid-wall enhancement involving the mid  inferoseptal wall at  the RV insertion point. This is a non-specific pattern of enhancement that has been  described in the setting of pulmonary hypertension.      He had a repeat right heart catheterization with exercise done the end of JulyRight sided filling pressures are mildly elevated.    Normal PA pressures.    Left sided filling pressures are mildly elevated.    Left ventricular filling pressures are mildly elevated.    Reduced cardiac output level.     RHC with exercise  (arm exercise lifting weights for 3 minutes per cycle)                     Baseline                            Low exercise      High exercise  HR         52                        58                        59  Ao         113/66                109/64                151/119  CO/CI    4.50/1.86            4.47/1.84            3.30/1.36  SV         86                        77                        56  PCW      17                        18                        20  PA         34/18/22            41/21/29            45/22/31  RV                                                                                             Post exercise 35/6/13  RA         13                        16                        11  SVR       15.76                   14.33                   36.40  PVR       1.11                     2.46                     3.34     INTERPRETATION\"  Difficult parameters. At rest the LV filling pressure is only mildly increased and the RV filling moderately increased. The CO is notably reduced and there appears to be at rest no significant pulm HTN.  With exercise there is " "little increase in HR (pt on BB for test) and the CO and Stroke Volume drop with exercise with mild increase in PVR but notable increase in SVR. Increase in systemic-SBP and mild increase in PA and PCW with exercise.     This may suggest \"ventriculo-arterial (V-A)\" coupling issue with \"stiff-non compliant\" systemic vasculature     REC-trial off beta blocker (to allow increased exercise HR and thus increased cardiac output), add low dose ACE and low dose Hydralazine as vasodilators. I will decrease his BPH/alpha blocker to allow more room for systemic vasodilators while trying to limit his known orthostatic hypotension from prior history.      Will get overnight oximetry to exclude hypoxia despite CPAP.       Camden appears frustrated and upset today about not feeling better. 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 He has been looking at his my chart results and insists that he has a low pO2 and high pCo2. I believe that part of what he is seeing are this blood gas results from PA blood run in the lab  We did an ABG and Pco2 is normal and Po2 is slightly low at 77. I tried multiple times to explain this to him today.    I walked him in the hallway and he does become dyspneic. His O2 sat never dropped below 92%. He feels fatigued and his family is telling him he is more forgetful and tired. He again refers back to his PA Pco2 about this.    His home BP seems to be somewhat fluctuant on the med change made. He can be 115 up to 140. Dr Mckeon left a parameter of holding hydralazine if under 85 and lisinopril if under 100 and he has not needed to do that. He has not had his BP cuff checked for accuracy and would like to take this to PCP which I support.    I ran a BMP today:soidum 140; potassium 4.2 BUN 26 Creatinine 1.23 TSH was done and is normal; fasting glucose is 134 and he states previous A1C at PCP has been normal but I do not have access to those " results.    Impression/Plan:      1. CAD with lad 35% and RCA 10% on angiogram in 2016  Recent CMR stress test negative for ischemia  -no angina     2. Preserved LVEF with diastolic dysfucntion     3. Ongoing PRETTY and mild orthopnea with MRI findings as above  diastolic dysfunction/pulmonary hypertension and elevated wedge  Possibly cor pulmonale  -this is a difficult situation  -he seems to be tolerating his increased vasodilators so now that I have labs back, I will have my nurse call him and attempt to advance hydralazine to 20mg tid; and increase torsemide to 20mg am 10mg pm  Will need BMP in 2-3 weeks and we can check his BP cuff here then for accuracy  -refer back to Pulmonary at [place]  -stay off of Coreg  -check overnight oximetry on CPAP   -continue lower dose Flomax  -the cause of his profound fatigue is not clear to me.  -I have arrange an office visit in 6 weeks     4. HTN  Controlled      5. No intracardiac shunt  Negative bubble study and negative MRI     6. Dilated aorta-mild 4.2cm-stable dating back to 2015  Continue to monitor by echo  Continue beta-blockers        7. Sleep apnea  Continue CPAP  -he has ongoing fatigue and has had his CPAP checked and it is optimally set per notes in care everywhere as of .   -check oximetry as noted    8. Hyperlipidemia  Improved following increase of his Lipitor to 20mg daily        9. 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      10. Lower extremity venous disease with ulcerations  -venous ultrasound shows incompetent veins  -he has been seen by Dr Conde  Continue support hose/torsemide        70 minutes spent on the date of the encounter doing chart review, review of test results, interpretation of tests, patient visit and documentation     All heart cath results, blood gas results reviewed in detail with the patient.      Micaela Mendoza, MSN, APRN-BC, CNP  Cardiology    Orders Placed This Encounter   Procedures     Basic metabolic panel     TSH  with free T4 reflex     Follow-Up with Cardiologist     Overnight oximetry study     No orders of the defined types were placed in this encounter.    Medications Discontinued During This Encounter   Medication Reason     albuterol (PROAIR HFA, PROVENTIL HFA, VENTOLIN HFA) 108 (90 BASE) MCG/ACT inhaler Medication Reconciliation Clean Up     Fluticasone Propionate (FLONASE NA) Medication Reconciliation Clean Up         Encounter Diagnoses   Name Primary?     Acute cor pulmonale (H)      Other fatigue      Dyspnea on exertion Yes       CURRENT MEDICATIONS:  Current Outpatient Medications   Medication Sig Dispense Refill     acetaminophen (TYLENOL) 500 MG tablet Take 500-1,000 mg by mouth every 6 hours as needed for mild pain       aspirin 81 MG tablet Take 1 tablet (81 mg) by mouth daily 30 tablet      atorvastatin (LIPITOR) 20 MG tablet Take 1 tablet (20 mg) by mouth daily 90 tablet 3     celecoxib (CELEBREX) 200 MG capsule Take 200 mg by mouth daily       cetirizine (ZYRTEC) 10 MG tablet Take 10 mg by mouth daily       colestipol (COLESTID) 1 G tablet Take 2 g by mouth 2 times daily Takes as needed for loose stools       hydrALAZINE (APRESOLINE) 10 MG tablet Take 2 tablets (20 mg) by mouth 3 times daily Hold systolic bp less than 85 180 tablet 11     lisinopril (ZESTRIL) 5 MG tablet Take 1 tablet (5 mg) by mouth daily Hold if Systolic Blood Pressure is less than 85. 30 tablet 3     meclizine 25 MG CHEW Take 25 mg by mouth every 6 hours as needed for dizziness       omeprazole (PRILOSEC) 20 MG DR capsule        potassium chloride ER (KLOR-CON M) 10 MEQ CR tablet Take 1 tablet (10 mEq) by mouth 2 times daily (Patient taking differently: Take 10 mEq by mouth 2 times daily Taking one pill) 180 tablet 3     sertraline (ZOLOFT) 100 MG tablet Take 100 mg by mouth daily       sertraline (ZOLOFT) 50 MG tablet Take 50 mg by mouth daily       spironolactone (ALDACTONE) 25 MG tablet Daily       tamsulosin (FLOMAX) 0.4 MG capsule  Take 1 capsule (0.4 mg) by mouth daily 30 capsule 3     torsemide (DEMADEX) 20 MG tablet 20mg in am; 10mg at noon 135 tablet 3     vitamin D3 (CHOLECALCIFEROL) 21599 UNITS capsule Take 1,000 Units by mouth 2 times daily        loperamide (IMODIUM) 2 MG capsule  (Patient not taking: Reported on 7/16/2021)         ALLERGIES     Allergies   Allergen Reactions     Seasonal Allergies        PAST MEDICAL HISTORY:  Past Medical History:   Diagnosis Date     Corey's esophagus      CAD (coronary artery disease) 02/2016    mild, nonobstructive per cath 2016     Concussion 2016    fall went to ER--now with dizzy and memory issue     Degenerative disc disease, lumbar      Depression with anxiety      Diastolic heart failure (H)      Gastroesophageal reflux disease 1999    lap nissen at [facility] june 1999     HTN (hypertension)      Hyperlipidemia      IBS (irritable bowel syndrome)     on colestid for diarrhea not cholesterol     Impaired glucose tolerance      Lichen simplex chronicus      Metabolic syndrome      Mild ascending aorta dilation (H)     40mm     Osteomyelitis of right leg (H) 1979     Restrictive lung disease     Dr No, mild restriction PFT     Sleep apnea     cpap     Venous disease     lower extremity       PAST SURGICAL HISTORY:  Past Surgical History:   Procedure Laterality Date     CARPAL TUNNEL RELEASE RT/LT Bilateral      CHOLECYSTECTOMY       CV RIGHT HEART CATH MEASUREMENTS RECORDED Right 7/9/2020    Procedure: Right Heart Cath WITH FULL OXIMETRY;  Surgeon: Boaz Bustillo MD;  Location:  HEART CARDIAC CATH LAB     CV RIGHT HEART CATH MEASUREMENTS RECORDED N/A 7/29/2021    Procedure: Right Heart Cath rest and exercise with elana in [place];  Surgeon: Dago Mckeon MD;  Location:  HEART CARDIAC CATH LAB     CV RIGHT HEART EXERCISE STRESS STUDY N/A 7/29/2021    Procedure: Right Heart Exercise Stress Study;  Surgeon: Dago Mckeon MD;  Location:  HEART CARDIAC CATH  LAB     KNEE SURGERY      arthroscopic (pt doesn't recall which knee)     NISSEN FUNDOPLICATION       ROTATOR CUFF REPAIR RT/LT Right        FAMILY HISTORY:  Family History   Problem Relation Age of Onset     Other Cancer Mother      Hypertension Mother      Colon Cancer Father      Asthma Father        SOCIAL HISTORY:  Social History     Socioeconomic History     Marital status:      Spouse name: None     Number of children: None     Years of education: None     Highest education level: None   Occupational History     Occupation:      Comment: service food machines   Tobacco Use     Smoking status: Never Smoker     Smokeless tobacco: Never Used   Substance and Sexual Activity     Alcohol use: Yes     Alcohol/week: 0.0 standard drinks     Comment: occ     Drug use: None     Sexual activity: None   Other Topics Concern     Parent/sibling w/ CABG, MI or angioplasty before 65F 55M? Not Asked      Service Not Asked     Blood Transfusions Not Asked     Caffeine Concern No     Comment: occ     Occupational Exposure Not Asked     Hobby Hazards Not Asked     Sleep Concern Not Asked     Stress Concern Not Asked     Weight Concern Not Asked     Special Diet No     Back Care Not Asked     Exercise No     Bike Helmet Not Asked     Seat Belt Not Asked     Self-Exams Not Asked   Social History Narrative     None     Social Determinants of Health     Financial Resource Strain:      Difficulty of Paying Living Expenses:    Food Insecurity:      Worried About Running Out of Food in the Last Year:      Ran Out of Food in the Last Year:    Transportation Needs:      Lack of Transportation (Medical):      Lack of Transportation (Non-Medical):    Physical Activity:      Days of Exercise per Week:      Minutes of Exercise per Session:    Stress:      Feeling of Stress :    Social Connections:      Frequency of Communication with Friends and Family:      Frequency of Social Gatherings with Friends and  "Family:      Attends [employer] Services:      Active Member of Clubs or Organizations:      Attends Club or Organization Meetings:      Marital Status:    Intimate Partner Violence:      Fear of Current or Ex-Partner:      Emotionally Abused:      Physically Abused:      Sexually Abused:        Review of Systems:  Skin:  Negative       Eyes:  Positive for glasses    ENT:  Positive for sinus trouble allergies  Respiratory:  Positive for sleep apnea;CPAP;dyspnea on exertion;shortness of breath;cough     Cardiovascular:    edema;Positive for;lightheadedness;dizziness;fatigue    Gastroenterology: Positive for heartburn treated  Genitourinary:  Positive for prostate problem    Musculoskeletal:  Positive for arthritis;neck pain;back pain shoulders, hands, knees; cramping in the hands  Neurologic:  Positive for numbness or tingling of feet once in a while  Psychiatric:  Positive for depression;anxiety    Heme/Lymph/Imm:  Positive for allergies    Endocrine:  Negative        Physical Exam:  Vitals: /70   Pulse 72   Ht 1.778 m (5' 10\")   Wt 128.4 kg (283 lb)   SpO2 93%   BMI 40.61 kg/m      Constitutional:  cooperative, alert and oriented, well developed, well nourished, in no acute distress        Skin:  warm and dry to the touch venous stasis changes   Weeping skin tears bilateral LE    Head:  normocephalic, no masses or lesions        Eyes:  pupils equal and round, conjunctivae and lids unremarkable, sclera white, no xanthalasma, EOMS intact, no nystagmus        Lymph:      ENT:  no pallor or cyanosis        Neck:           Respiratory:  normal symmetry         Cardiac: normal S1 and S2                pulses full and equal, no bruits auscultated                                        GI:  abdomen soft, non-tender, BS normoactive, no mass, no HSM, no bruits obese difficult exam with body habitus    Extremities and Muscular Skeletal:  no deformities, clubbing, cyanosis, erythema observed   trace " trace;telangiectasia;varicose vein telangiectasia;varicose vein;trace      Neurological:  no gross motor deficits        Psych:  Alert and Oriented x 3      Recent Lab Results:  LIPID RESULTS:  Lab Results   Component Value Date    CHOL 106 06/21/2021    HDL 35 (L) 06/21/2021    LDL 38 06/21/2021    TRIG 164 (H) 06/21/2021       LIVER ENZYME RESULTS:  Lab Results   Component Value Date    ALT 34 06/21/2021       CBC RESULTS:  Lab Results   Component Value Date    WBC 8.4 07/29/2021    WBC 6.9 07/09/2020    RBC 4.56 07/29/2021    RBC 4.58 07/09/2020    HGB 14.3 07/29/2021    HGB 14.2 07/09/2020    HCT 42.0 07/29/2021    HCT 42.9 07/09/2020    MCV 92 07/29/2021    MCV 94 07/09/2020    MCH 31.4 07/29/2021    MCH 31.0 07/09/2020    MCHC 34.0 07/29/2021    MCHC 33.1 07/09/2020    RDW 13.7 07/29/2021    RDW 13.7 07/09/2020     07/29/2021     (L) 07/09/2020       BMP RESULTS:  Lab Results   Component Value Date     08/27/2021     06/21/2021    POTASSIUM 4.2 08/27/2021    POTASSIUM 4.2 06/21/2021    CHLORIDE 108 08/27/2021    CHLORIDE 107 06/21/2021    CO2 25 08/27/2021    CO2 24 06/21/2021    ANIONGAP 7 08/27/2021    ANIONGAP 5 06/21/2021     (H) 08/27/2021     (H) 06/21/2021    BUN 26 08/27/2021    BUN 32 (H) 06/21/2021    CR 1.23 08/27/2021    CR 1.30 (H) 06/21/2021    GFRESTIMATED 61 08/27/2021    GFRESTIMATED 57 (L) 06/21/2021    GFRESTBLACK 66 06/21/2021    MARGA 8.7 08/27/2021    MARGA 8.8 06/21/2021        A1C RESULTS:  Lab Results   Component Value Date    A1C 5.6 01/26/2016       INR RESULTS:  Lab Results   Component Value Date    INR 1.05 07/29/2021    INR 1.09 07/09/2020    INR 1.09 02/01/2016           CC  CHRISTIANE Malin CNP  [address]  [place]  [place] [address]                    Thank you for allowing me to participate in the care of your patient.      Sincerely,     CHRISTIANE Malin CNP     [facility]  [facility]  cc:   Anni Mendoza, CHRISTIANE CNP  [address]  [place] [id number]

## 2021-08-27 NOTE — PATIENT INSTRUCTIONS
Check labs today and I will call you    -call [place] and see your lung doctor   -We will help arrange an overnight oximetry  -I will check your thyroid levels to see if that is why you are tired    -see us back for follow up  I will discuss any other thoughts with Dr. Mckeon when he returns and I get your labs back.  Check your blood pressure cuff for accuracy at your primary care MD

## 2021-08-27 NOTE — PROGRESS NOTES
Received call from pt's pharmacy, [facility], and reviewed with pharmacist that pt's carvedilol Rx was stopped on 7/29/21 and hydralazine 10mg TID was started at that time, so hydralazine Rx today is a dose increase, not a starting dose.     Called pt's home phone, no answer and left VM.    Called pt's cell phone and reviewed with pt PATIENCE Paris's recommendations to increase torsemide dose from 20mg daily to 20mg (1 tab) every AM and 10mg (1/2 tab) at noon to see if it will help his breathing. Also reviewed recommendation to increase hydralazine dose from 10mg TID to 20mg (2 tabs) TID with instructions to hold if SBP is <85. Pt wrote down instructions and agrees with this plan.     Also reviewed need for non-fasting lab and nurse visit for BP check in 2-3 weeks and he agrees to nurse visit on 9/10/21 at 10:00am and non-fasting lab at 10:15am.     ISABELL Guzmán 3:16 PM 8/27/2021

## 2021-08-27 NOTE — PROGRESS NOTES
Let him know labs are fine    I think we need to try to increase torsemide to 20am 10mg noon to see if breathing gets better  And advance hydralazine to 20mg tid-hold systolic BP less than 85    See Pulmonary at [place] which I told him  See Mike in October  Set up BMP and nurse visit here in 2-3 weeks and we can see if his home cuff is accurate--he brought the machine in today to show me numbers but not the cuff so we could not check it  Thanks  Update med list and ordered nurse visit and labs                Component      Latest Ref Rng & Units 8/27/2021   Sodium      133 - 144 mmol/L 140   Potassium      3.4 - 5.3 mmol/L 4.2   Chloride      94 - 109 mmol/L 108   Carbon Dioxide      20 - 32 mmol/L 25   Anion Gap      3 - 14 mmol/L 7   Urea Nitrogen      7 - 30 mg/dL 26   Creatinine      0.66 - 1.25 mg/dL 1.23   Calcium      8.5 - 10.1 mg/dL 8.7   Glucose      70 - 99 mg/dL 134 (H)   GFR Estimate      >60 mL/min/1.73m2 61   TSH      0.40 - 4.00 mU/L 2.95

## 2021-09-02 ENCOUNTER — TRANSFERRED RECORDS (OUTPATIENT)
Dept: HEALTH INFORMATION MANAGEMENT | Facility: CLINIC | Age: 66
End: 2021-09-02

## 2021-09-05 ENCOUNTER — HEALTH MAINTENANCE LETTER (OUTPATIENT)
Age: 66
End: 2021-09-05

## 2021-09-10 ENCOUNTER — DOCUMENTATION ONLY (OUTPATIENT)
Dept: CARDIOLOGY | Facility: CLINIC | Age: 66
End: 2021-09-10

## 2021-09-10 ENCOUNTER — LAB (OUTPATIENT)
Dept: LAB | Facility: CLINIC | Age: 66
End: 2021-09-10
Payer: MEDICARE

## 2021-09-10 ENCOUNTER — ALLIED HEALTH/NURSE VISIT (OUTPATIENT)
Dept: CARDIOLOGY | Facility: CLINIC | Age: 66
End: 2021-09-10
Payer: MEDICARE

## 2021-09-10 VITALS — HEART RATE: 76 BPM | DIASTOLIC BLOOD PRESSURE: 54 MMHG | SYSTOLIC BLOOD PRESSURE: 98 MMHG

## 2021-09-10 DIAGNOSIS — R06.09 DYSPNEA ON EXERTION: ICD-10-CM

## 2021-09-10 LAB
ANION GAP SERPL CALCULATED.3IONS-SCNC: 8 MMOL/L (ref 3–14)
BUN SERPL-MCNC: 52 MG/DL (ref 7–30)
CALCIUM SERPL-MCNC: 8.4 MG/DL (ref 8.5–10.1)
CHLORIDE BLD-SCNC: 105 MMOL/L (ref 94–109)
CO2 SERPL-SCNC: 24 MMOL/L (ref 20–32)
CREAT SERPL-MCNC: 1.83 MG/DL (ref 0.66–1.25)
GFR SERPL CREATININE-BSD FRML MDRD: 38 ML/MIN/1.73M2
GLUCOSE BLD-MCNC: 162 MG/DL (ref 70–99)
POTASSIUM BLD-SCNC: 4 MMOL/L (ref 3.4–5.3)
SODIUM SERPL-SCNC: 137 MMOL/L (ref 133–144)

## 2021-09-10 PROCEDURE — 80048 BASIC METABOLIC PNL TOTAL CA: CPT | Performed by: NURSE PRACTITIONER

## 2021-09-10 PROCEDURE — 36415 COLL VENOUS BLD VENIPUNCTURE: CPT | Performed by: NURSE PRACTITIONER

## 2021-09-10 PROCEDURE — 99207 PR NO CHARGE LOS: CPT | Performed by: NURSE PRACTITIONER

## 2021-09-10 NOTE — PROGRESS NOTES
ALLIED [facility]ALTH BLOOD PRESSURE CHECK     Last office visit: 8/27/21    Previous blood pressure: 120/70 mm Hg  Previous heart rate: 72 bpm      Time of visit: 9:59 AM    Morning medications were taken at: 6:30 AM     Today's blood pressure: 98/54 mm Hg  Today's heart rate: 76 bpm   Today's spO2: - 95-96%    Home monitor blood pressure: 108/67 mmHg  Home monitor heart rate: 73 bpm      Additional Comments: Will be transferring care to [place]. States his BP has been more steady lately.    Home BP machine 115/64, pulse 79  Second attempt: 103/60, pulse 73      Results routed to: Leta Culp      Ordering Provider: Micaela  In clinic Provider: Dr. Patterson

## 2021-09-10 NOTE — TELEPHONE ENCOUNTER
Bp is fine but bmp shows increase in creatinine post torsemide increase  Home bp is fairly accurate  Plan:  Reduce torsemide to 10mg daily for 2 days ..then back to old dose of 20.g daily...omit the 10 I added at noon  Ask if he feels any better and when he sees [place]  Labs again in 2 weeks unless seeing [place] before that

## 2021-09-10 NOTE — PROGRESS NOTES
ALLIED HEALTH BLOOD PRESSURE CHECK     Last office visit: 8/27/21    Previous blood pressure: 120/70 mm Hg  Previous heart rate: 72 bpm      Time of visit: 9:59 AM    Morning medications were taken at: 6:30 AM     Today's blood pressure: 98/54 mm Hg  Today's heart rate: 76 bpm   Today's spO2: - 95-96%    Home monitor blood pressure: 108/67 mmHg  Home monitor heart rate: 73 bpm      Additional Comments: Will be transferring care to [place]. States his BP has been more steady lately.    Home BP machine 115/64, pulse 79  Second attempt: 103/60, pulse 73      Results routed to: Leta Culp      Ordering Provider: Micaela  In clinic Provider: Dr. Patterson

## 2021-09-10 NOTE — PROGRESS NOTES
Called pt, no answer, left voicemail with detailed update from PATIENCE Paris that kidney BP is OK but kidney function is up with higher torsemide and recommendation to decrease torsemide to 10mg daily X 2 days and then go back to previous dose of 20mg daily, omitting the 10mg dose at noon. Also requested to know how he is feeling and if he will be seeing [place] provider in the next two weeks. Explained that PATIENCE Paris recommends he get labs rechecked in 2 weeks if not seeing [place] provider by then. Requested return call to confirm pt received VM and understood recommendations.     ISABELL Guzmán 3:57 PM 9/10/2021

## 2021-09-13 NOTE — PROGRESS NOTES
Called pt to see if he received message and understood instructions left on 9/10/21, no answer, left another VM reviewing recommendations from PATIENCE Paris and requesting return call.     ISABELL Guzmán 3:30 PM 9/13/2021

## 2021-09-14 NOTE — PROGRESS NOTES
Received VM from pt who reports he did get VMs with lab update and instructions per PATIENCE Paris and followed recommendations for torsemide dose decrease.     Pt reports that he realized that he took torsemide 20mg X 3 doses in a row just before getting his labs done last week so he is wondering if that was why his kidney function was up more than expected.     Pt states he is going to [place] for pulmonary visit next week and could have labs done there if needed.     Called pt, no answer, left VM requesting to confirm that he is back to torsmeide 20mg daily dose now and did decrease dose to 10mg daily X 2 days. Also reqeusted to know how is he is feeling and what day he will be at [place] so that lab order with date can be faxed there.     Routed to PATIENCE Paris as CATHERINE Guzmán RN 11:50 AM 9/14/2021

## 2021-10-01 DIAGNOSIS — I10 BENIGN ESSENTIAL HYPERTENSION: Primary | ICD-10-CM

## 2021-10-01 NOTE — PROGRESS NOTES
Per scheduling, pt reported he was told by PATIENCE Paris that he should have BMP drawn when he sees Dr. Mckeon so lab appt scheduled on 10/5/21 but no lab orders are entered. Per chart reivew, pt was advised on 9/1 to get labs rechecked at [facility] if being seen there in the next couple weeks Pt reported he was being seen the week of 9/20 and requested to know what date so lab order could be faxed but never received return call from pt so order was not faxed.     BMP order entered for 10/5/21.    ISABELL Guzmán 3:58 PM 10/1/2021

## 2021-10-05 ENCOUNTER — LAB (OUTPATIENT)
Dept: LAB | Facility: CLINIC | Age: 66
End: 2021-10-05
Payer: MEDICARE

## 2021-10-05 ENCOUNTER — TELEPHONE (OUTPATIENT)
Dept: CARDIOLOGY | Facility: CLINIC | Age: 66
End: 2021-10-05

## 2021-10-05 ENCOUNTER — OFFICE VISIT (OUTPATIENT)
Dept: CARDIOLOGY | Facility: CLINIC | Age: 66
End: 2021-10-05
Attending: NURSE PRACTITIONER
Payer: MEDICARE

## 2021-10-05 VITALS — OXYGEN SATURATION: 94 % | BODY MASS INDEX: 42.76 KG/M2 | HEIGHT: 69 IN | WEIGHT: 288.7 LBS

## 2021-10-05 DIAGNOSIS — I26.09 ACUTE COR PULMONALE (H): ICD-10-CM

## 2021-10-05 DIAGNOSIS — I10 BENIGN ESSENTIAL HYPERTENSION: ICD-10-CM

## 2021-10-05 LAB
ANION GAP SERPL CALCULATED.3IONS-SCNC: 4 MMOL/L (ref 3–14)
BUN SERPL-MCNC: 32 MG/DL (ref 7–30)
CALCIUM SERPL-MCNC: 9 MG/DL (ref 8.5–10.1)
CHLORIDE BLD-SCNC: 107 MMOL/L (ref 94–109)
CO2 SERPL-SCNC: 30 MMOL/L (ref 20–32)
CREAT SERPL-MCNC: 1.41 MG/DL (ref 0.66–1.25)
GFR SERPL CREATININE-BSD FRML MDRD: 52 ML/MIN/1.73M2
GLUCOSE BLD-MCNC: 148 MG/DL (ref 70–99)
POTASSIUM BLD-SCNC: 4 MMOL/L (ref 3.4–5.3)
SODIUM SERPL-SCNC: 141 MMOL/L (ref 133–144)

## 2021-10-05 PROCEDURE — 99215 OFFICE O/P EST HI 40 MIN: CPT | Performed by: INTERNAL MEDICINE

## 2021-10-05 PROCEDURE — 36415 COLL VENOUS BLD VENIPUNCTURE: CPT

## 2021-10-05 PROCEDURE — 80048 BASIC METABOLIC PNL TOTAL CA: CPT

## 2021-10-05 RX ORDER — FINASTERIDE 5 MG/1
5 TABLET, FILM COATED ORAL DAILY
COMMUNITY
Start: 2021-08-31 | End: 2022-06-29

## 2021-10-05 RX ORDER — TORSEMIDE 20 MG/1
20 TABLET ORAL DAILY
Qty: 135 TABLET | Refills: 3 | COMMUNITY
Start: 2021-10-05 | End: 2022-06-29

## 2021-10-05 ASSESSMENT — MIFFLIN-ST. JEOR: SCORE: 2079.91

## 2021-10-05 NOTE — PROGRESS NOTES
"Service Date: 10/05/2021    HISTORY OF PRESENT ILLNESS:  iAden Almaraz returns for followup.  He is a most pleasant 65-year-old gentleman.  He has been plagued by exertional shortness of breath.  Please see my extensive notes.    Today's visit was a little bit different.  I reviewed with him the data that we have already gone through and answered questions, but he is going to be going back to [facility] and [facility] in November, so I am going to see him in January to review all that.  He has all of our records with him, including his heart catheterizations and MRI, echocardiograms, etc.  I cannot access through [facility] the [facility] records, however.  Also, he did have overnight oximetry, but it was done through a private agency through [employer] and those reports are still not back.  I have asked my nurse, Ana Laura, to work on getting those reports.  The most I have come up with is that he has pulmonary hypertension and I think most of it is \"post capillary\" pulmonary hypertension, meaning that his LV filling pressures are elevated, causing back pressure and leading to his pulmonary hypertension.  The MRI again confirmed LV size and function was normal.  No evidence of infiltrative cardiomyopathy.  The right heart again was noted to be mildly enlarged with mildly decreased function.  He had a limited exercise right heart cath, which results are in Epic, and again the only thing I can come up with is some element of ventricular arterial compliance issues.  I do not know that he has, per se, a restrictive cardiomyopathy, but it technically would fit into that grouping or we could call it diastolic heart failure of the left ventricle with secondary pulmonary hypertension.  We note some mild aortic root enlargement, no significant valvular heart disease, trivial coronary disease.  I again had him check his O2 sats here in the office today.  Sitting, his heart rate was 85 with an O2 sat of " 97.  Walking in the [place], his heart rate went to 102 and his O2 sat dropped slightly to 94%.  He told me that at his physician's office they checked and it was actually into the 80s at one point, but I cannot reproduce that here.  He is on hydralazine, lisinopril, Demadex and Aldactone.  His ankle edema is better, but it is not reflected in the weight chart.  He states his weight is actually stable.  His creatinine had actually gone up, and so we had to back down on the Demadex.  We went from 20 in the morning and 10 in the afternoon down to 20 a day.  He is missing his middle hydralazine dose several times, but he is a little orthostatic.  His systolic blood pressure dropped to the high 90s with standing, so I told him if he wants, he could switch hydralazine to 10 t.i.d., rather than 20 b.i.d. (actually prescribed it 20 t.i.d., but he is not taking the middle dose).  He is going to [facility].  He describes what I am certain is going to be a cardiopulmonary stress test with metabolic gas analysis.  I was trained by Dr. Keith Maria at [facility], who is the father of this.  I told him that that is a very encouraging test and may help separate cardiac from arterial from pulmonary from deconditioning as causes of his shortness of breath.  Certainly, we have talked before about his BMI being elevated at 42.6 as a contributing factor here.  I will see him back in January after he has gone through the workup through the Cardiac and Pulmonary Departments at [facility].    Today's visit was 47 minutes but most of which was just reviewing old data and trying to access old records for review.    cc:  Calvin Desai MD  [address] [address]    Dago Mckeon MD        D: 10/05/2021   T: 10/05/2021   MT: laura    Name:     MATY NGUYỄN  MRN:      [id number]        Account:      [id number]   :      1955           Service Date: 10/05/2021       Document:  [id number]

## 2021-10-05 NOTE — PROGRESS NOTES
HPI and Plan:   See dictation    Orders Placed This Encounter   Procedures     Follow-Up with Cardiologist     Orders Placed This Encounter   Medications     finasteride (PROSCAR) 5 MG tablet     torsemide (DEMADEX) 20 MG tablet     Si tab daily     Dispense:  135 tablet     Refill:  3     Medications Discontinued During This Encounter   Medication Reason     tamsulosin (FLOMAX) 0.4 MG capsule      torsemide (DEMADEX) 20 MG tablet Reorder         Encounter Diagnosis   Name Primary?     Acute cor pulmonale (H)        CURRENT MEDICATIONS:  Current Outpatient Medications   Medication Sig Dispense Refill     acetaminophen (TYLENOL) 500 MG tablet Take 500-1,000 mg by mouth every 6 hours as needed for mild pain       aspirin 81 MG tablet Take 1 tablet (81 mg) by mouth daily 30 tablet      atorvastatin (LIPITOR) 20 MG tablet Take 1 tablet (20 mg) by mouth daily 90 tablet 3     celecoxib (CELEBREX) 200 MG capsule Take 200 mg by mouth daily       cetirizine (ZYRTEC) 10 MG tablet Take 10 mg by mouth daily       colestipol (COLESTID) 1 G tablet Take 2 g by mouth 2 times daily Takes as needed for loose stools       finasteride (PROSCAR) 5 MG tablet        hydrALAZINE (APRESOLINE) 10 MG tablet Take 2 tablets (20 mg) by mouth 3 times daily Hold systolic bp less than 85 180 tablet 11     lisinopril (ZESTRIL) 5 MG tablet Take 1 tablet (5 mg) by mouth daily Hold if Systolic Blood Pressure is less than 85. 30 tablet 3     loperamide (IMODIUM) 2 MG capsule        meclizine 25 MG CHEW Take 25 mg by mouth every 6 hours as needed for dizziness       omeprazole (PRILOSEC) 20 MG DR capsule        potassium chloride ER (KLOR-CON M) 10 MEQ CR tablet Take 1 tablet (10 mEq) by mouth 2 times daily (Patient taking differently: Take 10 mEq by mouth 2 times daily Taking one pill) 180 tablet 3     sertraline (ZOLOFT) 100 MG tablet Take 100 mg by mouth daily       sertraline (ZOLOFT) 50 MG tablet Take 50 mg by mouth daily       spironolactone  (ALDACTONE) 25 MG tablet Daily       torsemide (DEMADEX) 20 MG tablet 1 tab daily 135 tablet 3     vitamin D3 (CHOLECALCIFEROL) 87377 UNITS capsule Take 1,000 Units by mouth 2 times daily          ALLERGIES     Allergies   Allergen Reactions     Seasonal Allergies        PAST MEDICAL HISTORY:  Past Medical History:   Diagnosis Date     Corey's esophagus      CAD (coronary artery disease) 02/2016    mild, nonobstructive per cath 2016     Concussion 2016    fall went to ER--now with dizzy and memory issue     Degenerative disc disease, lumbar      Depression with anxiety      Diastolic heart failure (H)      Gastroesophageal reflux disease 1999    lap nissen at [facility] june 1999     HTN (hypertension)      Hyperlipidemia      IBS (irritable bowel syndrome)     on colestid for diarrhea not cholesterol     Impaired glucose tolerance      Lichen simplex chronicus      Metabolic syndrome      Mild ascending aorta dilation (H)     40mm     Osteomyelitis of right leg (H) 1979     Restrictive lung disease     Dr No, mild restriction PFT     Sleep apnea     cpap     Venous disease     lower extremity       PAST SURGICAL HISTORY:  Past Surgical History:   Procedure Laterality Date     CARPAL TUNNEL RELEASE RT/LT Bilateral      CHOLECYSTECTOMY       CV RIGHT HEART CATH MEASUREMENTS RECORDED Right 7/9/2020    Procedure: Right Heart Cath WITH FULL OXIMETRY;  Surgeon: Boaz Bustillo MD;  Location:  HEART CARDIAC CATH LAB     CV RIGHT HEART CATH MEASUREMENTS RECORDED N/A 7/29/2021    Procedure: Right Heart Cath rest and exercise with elana in [place];  Surgeon: Dago Mckeon MD;  Location:  HEART CARDIAC CATH LAB     CV RIGHT HEART EXERCISE STRESS STUDY N/A 7/29/2021    Procedure: Right Heart Exercise Stress Study;  Surgeon: Dago Mckeon MD;  Location:  HEART CARDIAC CATH LAB     KNEE SURGERY      arthroscopic (pt doesn't recall which knee)     NISSEN FUNDOPLICATION       ROTATOR CUFF  REPAIR RT/LT Right        FAMILY HISTORY:  Family History   Problem Relation Age of Onset     Other Cancer Mother      Hypertension Mother      Colon Cancer Father      Asthma Father        SOCIAL HISTORY:  Social History     Socioeconomic History     Marital status:      Spouse name: None     Number of children: None     Years of education: None     Highest education level: None   Occupational History     Occupation:      Comment: service food machines   Tobacco Use     Smoking status: Never Smoker     Smokeless tobacco: Never Used   Substance and Sexual Activity     Alcohol use: Yes     Alcohol/week: 0.0 standard drinks     Comment: occ     Drug use: None     Sexual activity: None   Other Topics Concern     Parent/sibling w/ CABG, MI or angioplasty before 65F 55M? Not Asked      Service Not Asked     Blood Transfusions Not Asked     Caffeine Concern No     Comment: occ     Occupational Exposure Not Asked     Hobby Hazards Not Asked     Sleep Concern Not Asked     Stress Concern Not Asked     Weight Concern Not Asked     Special Diet No     Back Care Not Asked     Exercise No     Bike Helmet Not Asked     Seat Belt Not Asked     Self-Exams Not Asked   Social History Narrative     None     Social Determinants of Health     Financial Resource Strain:      Difficulty of Paying Living Expenses:    Food Insecurity:      Worried About Running Out of Food in the Last Year:      Ran Out of Food in the Last Year:    Transportation Needs:      Lack of Transportation (Medical):      Lack of Transportation (Non-Medical):    Physical Activity:      Days of Exercise per Week:      Minutes of Exercise per Session:    Stress:      Feeling of Stress :    Social Connections:      Frequency of Communication with Friends and Family:      Frequency of Social Gatherings with Friends and Family:      Attends Worship Services:      Active Member of Clubs or Organizations:      Attends Club or  "Organization Meetings:      Marital Status:    Intimate Partner Violence:      Fear of Current or Ex-Partner:      Emotionally Abused:      Physically Abused:      Sexually Abused:        Review of Systems:  Skin:  Negative     Eyes:  Positive for glasses  ENT:  Positive for sinus trouble  Respiratory:  Positive for sleep apnea;CPAP;dyspnea on exertion;shortness of breath;cough  Cardiovascular:    edema;Positive for;lightheadedness;dizziness;fatigue;chest pain  Gastroenterology: Positive for heartburn  Genitourinary:  Positive for prostate problem  Musculoskeletal:  Positive for arthritis;neck pain;back pain  Neurologic:  Positive for numbness or tingling of feet  Psychiatric:  Positive for depression;anxiety  Heme/Lymph/Imm:  Positive for allergies  Endocrine:  Negative      Physical Exam:  Vitals: Ht 1.753 m (5' 9\")   Wt 131 kg (288 lb 11.2 oz)   SpO2 94%   BMI 42.63 kg/m      Constitutional:  cooperative, alert and oriented, well developed, well nourished, in no acute distress        Skin:  warm and dry to the touch, no apparent skin lesions or masses noted          Head:  normocephalic, no masses or lesions        Eyes:           Lymph:No Cervical lymphadenopathy present;No thyromegaly     ENT:           Neck:  carotid pulses are full and equal bilaterally, JVP normal, no carotid bruit        Respiratory:  normal breath sounds, clear to auscultation, normal A-P diameter, normal symmetry, normal respiratory excursion, no use of accessory muscles         Cardiac: regular rhythm, normal S1/S2, no S3 or S4, apical impulse not displaced, no murmurs, gallops or rubs                                                         GI:  BS normoactive obese      Extremities and Muscular Skeletal:      bilateral LE edema;1+     chronic hemosiderin deposits    Neurological:  no gross motor deficits        Psych:  Alert and Oriented x 3      Recent Lab Results:  LIPID RESULTS:  Lab Results   Component Value Date    CHOL 106 " 06/21/2021    HDL 35 (L) 06/21/2021    LDL 38 06/21/2021    TRIG 164 (H) 06/21/2021       LIVER ENZYME RESULTS:  Lab Results   Component Value Date    ALT 34 06/21/2021       CBC RESULTS:  Lab Results   Component Value Date    WBC 8.4 07/29/2021    WBC 6.9 07/09/2020    RBC 4.56 07/29/2021    RBC 4.58 07/09/2020    HGB 14.3 07/29/2021    HGB 14.2 07/09/2020    HCT 42.0 07/29/2021    HCT 42.9 07/09/2020    MCV 92 07/29/2021    MCV 94 07/09/2020    MCH 31.4 07/29/2021    MCH 31.0 07/09/2020    MCHC 34.0 07/29/2021    MCHC 33.1 07/09/2020    RDW 13.7 07/29/2021    RDW 13.7 07/09/2020     07/29/2021     (L) 07/09/2020       BMP RESULTS:  Lab Results   Component Value Date     10/05/2021     06/21/2021    POTASSIUM 4.0 10/05/2021    POTASSIUM 4.2 06/21/2021    CHLORIDE 107 10/05/2021    CHLORIDE 107 06/21/2021    CO2 30 10/05/2021    CO2 24 06/21/2021    ANIONGAP 4 10/05/2021    ANIONGAP 5 06/21/2021     (H) 10/05/2021     (H) 06/21/2021    BUN 32 (H) 10/05/2021    BUN 32 (H) 06/21/2021    CR 1.41 (H) 10/05/2021    CR 1.30 (H) 06/21/2021    GFRESTIMATED 52 (L) 10/05/2021    GFRESTIMATED 57 (L) 06/21/2021    GFRESTBLACK 66 06/21/2021    MARGA 9.0 10/05/2021    MARGA 8.8 06/21/2021        A1C RESULTS:  Lab Results   Component Value Date    A1C 5.6 01/26/2016       INR RESULTS:  Lab Results   Component Value Date    INR 1.05 07/29/2021    INR 1.09 07/09/2020    INR 1.09 02/01/2016           CC  Anni Mendoza, CHRISTIANE CNP  [address]  [place] [address]

## 2021-10-06 ENCOUNTER — TELEPHONE (OUTPATIENT)
Dept: CARDIOLOGY | Facility: CLINIC | Age: 66
End: 2021-10-06

## 2021-10-06 NOTE — TELEPHONE ENCOUNTER
Called [facility] per DR Mckeon request after reviewing Oximetery results asked for return call to discuss if Pt needs to be seen for sleep apnea. T Sae Du can you call [facility]  I  dont  understand  the hand written  report it says c/w ????SBD-- XX disordered breathing??  Ask them if this patient should be seen by them for sleep apnea clinic???

## 2021-10-06 NOTE — TELEPHONE ENCOUNTER
Call to  [facility] [phone] requesting return call to discuss Oximetry report and if Pt needs sleep apnea clinic left return phone number for discussion. RAEANN Quevedo rN

## 2021-10-06 NOTE — TELEPHONE ENCOUNTER
Ana Laura can you call [facility] dr marley  I  dont  understand  the hand written  report it says c/w ????SBD-- XX disordered breathing??  Ask them if this patient should be seen by them for sleep apnea clinic???

## 2021-10-07 NOTE — TELEPHONE ENCOUNTER
"Pt did call to find out results of [facility] Oximetry results.  Did return Pt call and informed him did receive message from [facility]:   RN  Phone Number: [phone]     Aiden Bee was a pulmonary patient of our clinic but has not been seen since 2015. Patient last listed Sleep provider was Dr. Jose Mercado at [facility] with last listed visit on 12/01/2020 for annual pap therapy visit. To clarify Dr. Ornelas's note on 09/02/2021 overnight O2 study, \"SDB\" stands for \"sleep disordered breathing\" so I would recommend reaching out to Dr. Mercado with what his recommendations are regarding results of study.  Their phone number is [phone]. Please let me know if you or the providers need any more assistance with this.- David KNUTSON RN     Left message for Pt he can contact DR Mercado and inform them of results  at [facility] and they can address. RAEANN Quevedo RN  "

## 2021-10-08 ENCOUNTER — TELEPHONE (OUTPATIENT)
Dept: CARDIOLOGY | Facility: CLINIC | Age: 66
End: 2021-10-08

## 2021-10-08 NOTE — TELEPHONE ENCOUNTER
Nadine called from [facility] to request the Oximetry test results from 9/2/21 be faxed to her for primary care visit. Document faxed to [phone].

## 2022-01-03 ENCOUNTER — OFFICE VISIT (OUTPATIENT)
Dept: CARDIOLOGY | Facility: CLINIC | Age: 67
End: 2022-01-03
Attending: INTERNAL MEDICINE
Payer: MEDICARE

## 2022-01-03 ENCOUNTER — LAB (OUTPATIENT)
Dept: LAB | Facility: CLINIC | Age: 67
End: 2022-01-03
Payer: MEDICARE

## 2022-01-03 VITALS
HEART RATE: 62 BPM | BODY MASS INDEX: 43.06 KG/M2 | WEIGHT: 290.7 LBS | DIASTOLIC BLOOD PRESSURE: 68 MMHG | OXYGEN SATURATION: 97 % | HEIGHT: 69 IN | SYSTOLIC BLOOD PRESSURE: 108 MMHG

## 2022-01-03 DIAGNOSIS — I26.09 ACUTE COR PULMONALE (H): ICD-10-CM

## 2022-01-03 DIAGNOSIS — E03.9 HYPOTHYROIDISM, UNSPECIFIED TYPE: ICD-10-CM

## 2022-01-03 DIAGNOSIS — R73.01 IFG (IMPAIRED FASTING GLUCOSE): ICD-10-CM

## 2022-01-03 DIAGNOSIS — R73.01 IFG (IMPAIRED FASTING GLUCOSE): Primary | ICD-10-CM

## 2022-01-03 DIAGNOSIS — I50.30 HEART FAILURE WITH PRESERVED EJECTION FRACTION, NYHA CLASS I (H): ICD-10-CM

## 2022-01-03 LAB
ANION GAP SERPL CALCULATED.3IONS-SCNC: 7 MMOL/L (ref 3–14)
BUN SERPL-MCNC: 32 MG/DL (ref 7–30)
CALCIUM SERPL-MCNC: 9.1 MG/DL (ref 8.5–10.1)
CHLORIDE BLD-SCNC: 109 MMOL/L (ref 94–109)
CO2 SERPL-SCNC: 23 MMOL/L (ref 20–32)
CREAT SERPL-MCNC: 1.51 MG/DL (ref 0.66–1.25)
GFR SERPL CREATININE-BSD FRML MDRD: 50 ML/MIN/1.73M2
GLUCOSE BLD-MCNC: 136 MG/DL (ref 70–99)
HBA1C MFR BLD: 5.9 % (ref 0–5.6)
POTASSIUM BLD-SCNC: 4.2 MMOL/L (ref 3.4–5.3)
SODIUM SERPL-SCNC: 139 MMOL/L (ref 133–144)
TSH SERPL DL<=0.005 MIU/L-ACNC: 2.52 MU/L (ref 0.4–4)

## 2022-01-03 PROCEDURE — 80048 BASIC METABOLIC PNL TOTAL CA: CPT

## 2022-01-03 PROCEDURE — 83036 HEMOGLOBIN GLYCOSYLATED A1C: CPT

## 2022-01-03 PROCEDURE — 99215 OFFICE O/P EST HI 40 MIN: CPT | Performed by: INTERNAL MEDICINE

## 2022-01-03 PROCEDURE — 84443 ASSAY THYROID STIM HORMONE: CPT

## 2022-01-03 PROCEDURE — 36415 COLL VENOUS BLD VENIPUNCTURE: CPT

## 2022-01-03 ASSESSMENT — MIFFLIN-ST. JEOR: SCORE: 2083.99

## 2022-01-03 NOTE — PROGRESS NOTES
HPI and Plan:   See dictation    Orders Placed This Encounter   Procedures     Basic metabolic panel     TSH with free T4 reflex     Hemoglobin A1c     Basic metabolic panel     Follow-Up with Cardiologist     No orders of the defined types were placed in this encounter.    There are no discontinued medications.      Encounter Diagnoses   Name Primary?     Acute cor pulmonale (H)      IFG (impaired fasting glucose) Yes     Heart failure with preserved ejection fraction, NYHA class I (H)      Hypothyroidism, unspecified type        CURRENT MEDICATIONS:  Current Outpatient Medications   Medication Sig Dispense Refill     acetaminophen (TYLENOL) 500 MG tablet Take 500-1,000 mg by mouth every 6 hours as needed for mild pain       aspirin 81 MG tablet Take 1 tablet (81 mg) by mouth daily 30 tablet      atorvastatin (LIPITOR) 20 MG tablet Take 1 tablet (20 mg) by mouth daily 90 tablet 3     celecoxib (CELEBREX) 200 MG capsule Take 200 mg by mouth daily       cetirizine (ZYRTEC) 10 MG tablet Take 10 mg by mouth daily       colestipol (COLESTID) 1 G tablet Take 2 g by mouth 2 times daily Takes as needed for loose stools       finasteride (PROSCAR) 5 MG tablet        hydrALAZINE (APRESOLINE) 10 MG tablet Take 2 tablets (20 mg) by mouth 3 times daily Hold systolic bp less than 85 180 tablet 11     lisinopril (ZESTRIL) 5 MG tablet Take 1 tablet (5 mg) by mouth daily Hold if Systolic Blood Pressure is less than 85. 30 tablet 3     loperamide (IMODIUM) 2 MG capsule        meclizine 25 MG CHEW Take 25 mg by mouth every 6 hours as needed for dizziness       omeprazole (PRILOSEC) 20 MG DR capsule        potassium chloride ER (KLOR-CON M) 10 MEQ CR tablet Take 1 tablet (10 mEq) by mouth 2 times daily (Patient taking differently: Take 10 mEq by mouth 2 times daily Taking one pill) 180 tablet 3     sertraline (ZOLOFT) 100 MG tablet Take 100 mg by mouth daily       sertraline (ZOLOFT) 50 MG tablet Take 50 mg by mouth daily        spironolactone (ALDACTONE) 25 MG tablet Daily       torsemide (DEMADEX) 20 MG tablet 1 tab daily 135 tablet 3     vitamin D3 (CHOLECALCIFEROL) 31139 UNITS capsule Take 1,000 Units by mouth 2 times daily          ALLERGIES     Allergies   Allergen Reactions     Seasonal Allergies        PAST MEDICAL HISTORY:  Past Medical History:   Diagnosis Date     Corey's esophagus      CAD (coronary artery disease) 02/2016    mild, nonobstructive per cath 2016     Concussion 2016    fall went to ER--now with dizzy and memory issue     Degenerative disc disease, lumbar      Depression with anxiety      Diastolic heart failure (H)      Gastroesophageal reflux disease 1999    lap nissen at [facility] june 1999     HTN (hypertension)      Hyperlipidemia      IBS (irritable bowel syndrome)     on colestid for diarrhea not cholesterol     Impaired glucose tolerance      Lichen simplex chronicus      Metabolic syndrome      Mild ascending aorta dilation (H)     40mm     Osteomyelitis of right leg (H) 1979     Restrictive lung disease     Dr No, mild restriction PFT     Sleep apnea     cpap     Venous disease     lower extremity       PAST SURGICAL HISTORY:  Past Surgical History:   Procedure Laterality Date     CARPAL TUNNEL RELEASE RT/LT Bilateral      CHOLECYSTECTOMY       CV RIGHT HEART CATH MEASUREMENTS RECORDED Right 7/9/2020    Procedure: Right Heart Cath WITH FULL OXIMETRY;  Surgeon: Boaz Bustillo MD;  Location:  HEART CARDIAC CATH LAB     CV RIGHT HEART CATH MEASUREMENTS RECORDED N/A 7/29/2021    Procedure: Right Heart Cath rest and exercise with elana in [place];  Surgeon: Dago Mckeon MD;  Location:  HEART CARDIAC CATH LAB     CV RIGHT HEART EXERCISE STRESS STUDY N/A 7/29/2021    Procedure: Right Heart Exercise Stress Study;  Surgeon: Dago Mckeon MD;  Location:  HEART CARDIAC CATH LAB     KNEE SURGERY      arthroscopic (pt doesn't recall which knee)     NISSEN FUNDOPLICATION        ROTATOR CUFF REPAIR RT/LT Right        FAMILY HISTORY:  Family History   Problem Relation Age of Onset     Other Cancer Mother      Hypertension Mother      Colon Cancer Father      Asthma Father        SOCIAL HISTORY:  Social History     Socioeconomic History     Marital status:      Spouse name: None     Number of children: None     Years of education: None     Highest education level: None   Occupational History     Occupation:      Comment: service food machines   Tobacco Use     Smoking status: Never Smoker     Smokeless tobacco: Never Used   Substance and Sexual Activity     Alcohol use: Yes     Alcohol/week: 0.0 standard drinks     Comment: occ     Drug use: None     Sexual activity: None   Other Topics Concern     Parent/sibling w/ CABG, MI or angioplasty before 65F 55M? Not Asked      Service Not Asked     Blood Transfusions Not Asked     Caffeine Concern No     Comment: occ     Occupational Exposure Not Asked     Hobby Hazards Not Asked     Sleep Concern Not Asked     Stress Concern Not Asked     Weight Concern Not Asked     Special Diet No     Back Care Not Asked     Exercise No     Bike Helmet Not Asked     Seat Belt Not Asked     Self-Exams Not Asked   Social History Narrative     None     Social Determinants of Health     Financial Resource Strain: Not on file   Food Insecurity: Not on file   Transportation Needs: Not on file   Physical Activity: Not on file   Stress: Not on file   Social Connections: Not on file   Intimate Partner Violence: Not on file   Housing Stability: Not on file       Review of Systems:  Skin:  Negative     Eyes:  Positive for double vision  ENT:  Positive for    Respiratory:  Positive for shortness of breath;dyspnea on exertion;dyspnea at rest;sleep apnea;CPAP  Cardiovascular:    edema;Positive for;lightheadedness;dizziness;fatigue  Gastroenterology: Positive for heartburn  Genitourinary:  not assessed    Musculoskeletal:  Positive for neck  "pain  Neurologic:  Positive for numbness or tingling of feet  Psychiatric:  Positive for sleep disturbances  Heme/Lymph/Imm:  Negative    Endocrine:  Negative      Physical Exam:  Vitals: /68 (BP Location: Right arm, Patient Position: Sitting, Cuff Size: Adult Large)   Pulse 62   Ht 1.753 m (5' 9\")   Wt 131.9 kg (290 lb 11.2 oz)   SpO2 97%   BMI 42.93 kg/m      Constitutional:           Skin:             Head:           Eyes:           Lymph:      ENT:           Neck:           Respiratory:            Cardiac:                                                           GI:           Extremities and Muscular Skeletal:                 Neurological:           Psych:         Recent Lab Results:  LIPID RESULTS:  Lab Results   Component Value Date    CHOL 106 06/21/2021    HDL 35 (L) 06/21/2021    LDL 38 06/21/2021    TRIG 164 (H) 06/21/2021       LIVER ENZYME RESULTS:  Lab Results   Component Value Date    ALT 34 06/21/2021       CBC RESULTS:  Lab Results   Component Value Date    WBC 8.4 07/29/2021    WBC 6.9 07/09/2020    RBC 4.56 07/29/2021    RBC 4.58 07/09/2020    HGB 14.3 07/29/2021    HGB 14.2 07/09/2020    HCT 42.0 07/29/2021    HCT 42.9 07/09/2020    MCV 92 07/29/2021    MCV 94 07/09/2020    MCH 31.4 07/29/2021    MCH 31.0 07/09/2020    MCHC 34.0 07/29/2021    MCHC 33.1 07/09/2020    RDW 13.7 07/29/2021    RDW 13.7 07/09/2020     07/29/2021     (L) 07/09/2020       BMP RESULTS:  Lab Results   Component Value Date     10/05/2021     06/21/2021    POTASSIUM 4.0 10/05/2021    POTASSIUM 4.2 06/21/2021    CHLORIDE 107 10/05/2021    CHLORIDE 107 06/21/2021    CO2 30 10/05/2021    CO2 24 06/21/2021    ANIONGAP 4 10/05/2021    ANIONGAP 5 06/21/2021     (H) 10/05/2021     (H) 06/21/2021    BUN 32 (H) 10/05/2021    BUN 32 (H) 06/21/2021    CR 1.41 (H) 10/05/2021    CR 1.30 (H) 06/21/2021    GFRESTIMATED 52 (L) 10/05/2021    GFRESTIMATED 57 (L) 06/21/2021    GFRESTBLACK 66 " 06/21/2021    MARGA 9.0 10/05/2021    MARGA 8.8 06/21/2021        A1C RESULTS:  Lab Results   Component Value Date    A1C 5.6 01/26/2016       INR RESULTS:  Lab Results   Component Value Date    INR 1.05 07/29/2021    INR 1.09 07/09/2020    INR 1.09 02/01/2016           CC  Dago Mckeon MD  [address]  [place] [id number]

## 2022-01-03 NOTE — LETTER
1/3/2022    Calvin Desai  [address] [address]    RE: Aiden Almaraz     Dear Colleague,     I had the pleasure of seeing Aiden Almaraz in the [facility].  HPI and Plan:   See dictation    Orders Placed This Encounter   Procedures     Basic metabolic panel     TSH with free T4 reflex     Hemoglobin A1c     Basic metabolic panel     Follow-Up with Cardiologist     No orders of the defined types were placed in this encounter.    There are no discontinued medications.      Encounter Diagnoses   Name Primary?     Acute cor pulmonale (H)      IFG (impaired fasting glucose) Yes     Heart failure with preserved ejection fraction, NYHA class I (H)      Hypothyroidism, unspecified type        CURRENT MEDICATIONS:  Current Outpatient Medications   Medication Sig Dispense Refill     acetaminophen (TYLENOL) 500 MG tablet Take 500-1,000 mg by mouth every 6 hours as needed for mild pain       aspirin 81 MG tablet Take 1 tablet (81 mg) by mouth daily 30 tablet      atorvastatin (LIPITOR) 20 MG tablet Take 1 tablet (20 mg) by mouth daily 90 tablet 3     celecoxib (CELEBREX) 200 MG capsule Take 200 mg by mouth daily       cetirizine (ZYRTEC) 10 MG tablet Take 10 mg by mouth daily       colestipol (COLESTID) 1 G tablet Take 2 g by mouth 2 times daily Takes as needed for loose stools       finasteride (PROSCAR) 5 MG tablet        hydrALAZINE (APRESOLINE) 10 MG tablet Take 2 tablets (20 mg) by mouth 3 times daily Hold systolic bp less than 85 180 tablet 11     lisinopril (ZESTRIL) 5 MG tablet Take 1 tablet (5 mg) by mouth daily Hold if Systolic Blood Pressure is less than 85. 30 tablet 3     loperamide (IMODIUM) 2 MG capsule        meclizine 25 MG CHEW Take 25 mg by mouth every 6 hours as needed for dizziness       omeprazole (PRILOSEC) 20 MG DR capsule        potassium chloride ER (KLOR-CON M) 10 MEQ CR tablet Take 1 tablet (10 mEq) by mouth 2 times daily (Patient  taking differently: Take 10 mEq by mouth 2 times daily Taking one pill) 180 tablet 3     sertraline (ZOLOFT) 100 MG tablet Take 100 mg by mouth daily       sertraline (ZOLOFT) 50 MG tablet Take 50 mg by mouth daily       spironolactone (ALDACTONE) 25 MG tablet Daily       torsemide (DEMADEX) 20 MG tablet 1 tab daily 135 tablet 3     vitamin D3 (CHOLECALCIFEROL) 17093 UNITS capsule Take 1,000 Units by mouth 2 times daily          ALLERGIES     Allergies   Allergen Reactions     Seasonal Allergies        PAST MEDICAL HISTORY:  Past Medical History:   Diagnosis Date     Corey's esophagus      CAD (coronary artery disease) 02/2016    mild, nonobstructive per cath 2016     Concussion 2016    fall went to ER--now with dizzy and memory issue     Degenerative disc disease, lumbar      Depression with anxiety      Diastolic heart failure (H)      Gastroesophageal reflux disease 1999    lap nissen at [facility] june 1999     HTN (hypertension)      Hyperlipidemia      IBS (irritable bowel syndrome)     on colestid for diarrhea not cholesterol     Impaired glucose tolerance      Lichen simplex chronicus      Metabolic syndrome      Mild ascending aorta dilation (H)     40mm     Osteomyelitis of right leg (H) 1979     Restrictive lung disease     Dr No, mild restriction PFT     Sleep apnea     cpap     Venous disease     lower extremity       PAST SURGICAL HISTORY:  Past Surgical History:   Procedure Laterality Date     CARPAL TUNNEL RELEASE RT/LT Bilateral      CHOLECYSTECTOMY       CV RIGHT HEART CATH MEASUREMENTS RECORDED Right 7/9/2020    Procedure: Right Heart Cath WITH FULL OXIMETRY;  Surgeon: Boaz Bustillo MD;  Location:  HEART CARDIAC CATH LAB     CV RIGHT HEART CATH MEASUREMENTS RECORDED N/A 7/29/2021    Procedure: Right Heart Cath rest and exercise with elana in [place];  Surgeon: Dago Mckeon MD;  Location:  HEART CARDIAC CATH LAB     CV RIGHT HEART EXERCISE STRESS STUDY N/A  7/29/2021    Procedure: Right Heart Exercise Stress Study;  Surgeon: Dago Mckeon MD;  Location:  HEART CARDIAC CATH LAB     KNEE SURGERY      arthroscopic (pt doesn't recall which knee)     NISSEN FUNDOPLICATION       ROTATOR CUFF REPAIR RT/LT Right        FAMILY HISTORY:  Family History   Problem Relation Age of Onset     Other Cancer Mother      Hypertension Mother      Colon Cancer Father      Asthma Father        SOCIAL HISTORY:  Social History     Socioeconomic History     Marital status:      Spouse name: None     Number of children: None     Years of education: None     Highest education level: None   Occupational History     Occupation:      Comment: service food machines   Tobacco Use     Smoking status: Never Smoker     Smokeless tobacco: Never Used   Substance and Sexual Activity     Alcohol use: Yes     Alcohol/week: 0.0 standard drinks     Comment: occ     Drug use: None     Sexual activity: None   Other Topics Concern     Parent/sibling w/ CABG, MI or angioplasty before 65F 55M? Not Asked      Service Not Asked     Blood Transfusions Not Asked     Caffeine Concern No     Comment: occ     Occupational Exposure Not Asked     Hobby Hazards Not Asked     Sleep Concern Not Asked     Stress Concern Not Asked     Weight Concern Not Asked     Special Diet No     Back Care Not Asked     Exercise No     Bike Helmet Not Asked     Seat Belt Not Asked     Self-Exams Not Asked   Social History Narrative     None     Social Determinants of Health     Financial Resource Strain: Not on file   Food Insecurity: Not on file   Transportation Needs: Not on file   Physical Activity: Not on file   Stress: Not on file   Social Connections: Not on file   Intimate Partner Violence: Not on file   Housing Stability: Not on file       Review of Systems:  Skin:  Negative     Eyes:  Positive for double vision  ENT:  Positive for    Respiratory:  Positive for shortness of breath;dyspnea on  "exertion;dyspnea at rest;sleep apnea;CPAP  Cardiovascular:    edema;Positive for;lightheadedness;dizziness;fatigue  Gastroenterology: Positive for heartburn  Genitourinary:  not assessed    Musculoskeletal:  Positive for neck pain  Neurologic:  Positive for numbness or tingling of feet  Psychiatric:  Positive for sleep disturbances  Heme/Lymph/Imm:  Negative    Endocrine:  Negative      Physical Exam:  Vitals: /68 (BP Location: Right arm, Patient Position: Sitting, Cuff Size: Adult Large)   Pulse 62   Ht 1.753 m (5' 9\")   Wt 131.9 kg (290 lb 11.2 oz)   SpO2 97%   BMI 42.93 kg/m      Constitutional:           Skin:             Head:           Eyes:           Lymph:      ENT:           Neck:           Respiratory:            Cardiac:                                                           GI:           Extremities and Muscular Skeletal:                 Neurological:           Psych:         Recent Lab Results:  LIPID RESULTS:  Lab Results   Component Value Date    CHOL 106 06/21/2021    HDL 35 (L) 06/21/2021    LDL 38 06/21/2021    TRIG 164 (H) 06/21/2021       LIVER ENZYME RESULTS:  Lab Results   Component Value Date    ALT 34 06/21/2021       CBC RESULTS:  Lab Results   Component Value Date    WBC 8.4 07/29/2021    WBC 6.9 07/09/2020    RBC 4.56 07/29/2021    RBC 4.58 07/09/2020    HGB 14.3 07/29/2021    HGB 14.2 07/09/2020    HCT 42.0 07/29/2021    HCT 42.9 07/09/2020    MCV 92 07/29/2021    MCV 94 07/09/2020    MCH 31.4 07/29/2021    MCH 31.0 07/09/2020    MCHC 34.0 07/29/2021    MCHC 33.1 07/09/2020    RDW 13.7 07/29/2021    RDW 13.7 07/09/2020     07/29/2021     (L) 07/09/2020       BMP RESULTS:  Lab Results   Component Value Date     10/05/2021     06/21/2021    POTASSIUM 4.0 10/05/2021    POTASSIUM 4.2 06/21/2021    CHLORIDE 107 10/05/2021    CHLORIDE 107 06/21/2021    CO2 30 10/05/2021    CO2 24 06/21/2021    ANIONGAP 4 10/05/2021    ANIONGAP 5 06/21/2021     (H) " 10/05/2021     (H) 06/21/2021    BUN 32 (H) 10/05/2021    BUN 32 (H) 06/21/2021    CR 1.41 (H) 10/05/2021    CR 1.30 (H) 06/21/2021    GFRESTIMATED 52 (L) 10/05/2021    GFRESTIMATED 57 (L) 06/21/2021    GFRESTBLACK 66 06/21/2021    MARGA 9.0 10/05/2021    MARGA 8.8 06/21/2021        A1C RESULTS:  Lab Results   Component Value Date    A1C 5.6 01/26/2016       INR RESULTS:  Lab Results   Component Value Date    INR 1.05 07/29/2021    INR 1.09 07/09/2020    INR 1.09 02/01/2016     Service Date: 01/03/2022    CARDIOLOGY OFFICE NOTE    OFFICE NOTE:  Aiden Almaraz is a delightful, 66-year-old gentleman.  Please see my copious previous notes.  We have checked this gentleman from head to toe in terms of heart failure with an MRI of the heart, echoes, heart caths, left and right.  My feeling was he had heart failure with preserved ejection fraction due to noncompliance of the left ventricle and perhaps noncompliance of the arteries with ventricular arterial noncompliance.  We had documented intermittent hypoxia at night, and he has known sleep apnea.  My assumption was that this was all a combination of metabolic syndrome with impaired fasting glucose with impaired relaxation of the left ventricle, coupled with sleep apnea and overnight hypoxia.  He went to [facility] and had extensive testing, which we reviewed today.  Today's visit was literally 59 minutes to review all the data.  [facility] came to the same conclusion that this was shortness of breath due to elevated BMI and deconditioning with an element of sleep apnea and prolonged episodes of hypoxia; in fact, I believe he had over 180 minutes of hypoxia over his sleep study.  In fact, he told me that the [facility] used the same example I did about a noncompliant ventricle being like a brand-new balloon when one is trying to blow air into the balloon, but it is resisting because it is so stiff, but once the air is in there, the balloon flies off into the air.   They did not change any of the medications we had him on. They are recommending he see a dietitian.  I am going to go ahead and follow up with a hemoglobin A1c because they started him on metformin.  I am almost certain what they really wanted to do was place him on SGLT2 medication, but his hemoglobin A1c was not quite high enough to justify that, so they went with metformin.  I think that they are coming to the exact same conclusion I have, and the patient now realizes that they were indeed saying the same thing that we have been saying.  We briefly today again talked about gastric bypass, although my concern is that we need to push diet and exercise programs traditionally before we go there, and my concern is that sometimes we are successful with weight loss only to have the weight return.      I briefly talked to him about GLP-1 medications, but I told him that that is more for heart attack prevention rather than heart failure, so if he was going to go on a medicine xxxxxxxxxxxxxxxxx metformin, it probably would be SGLT2, but I think diet and exercise make the most sense.  I am going to go ahead, as I mentioned, and order an electrolyte panel.  His creatinine is mildly elevated, so we have to make sure we do not over diurese him.  We talked about salt and water again today in his diet.  I will get a TSH because it was mildly elevated on a previous test, and we will get a hemoglobin A1c now that he is on metformin.  He will call my office for those results.  I will see him back in 6 months, and we are going to encourage him to see his lung doctor to determine if there is anything more that can be done from the sleep apnea aspect.    Today's visit was 59 minutes, and a lot was reviewed, including the extensive records from [facility], including cardiopulmonary stress test, echocardiogram and sleep studies, which were done at [facility] and with his primary doctor's clinic.    Dago Mckeon,  MD    cc:  Calvin Desai MD   [facility]  [address] [place] [address]    D: 2022   T: 2022   MT: amparo  Name:     MATY NGUYỄN  MRN:      [id number]        Account:      [id number]   :      1955           Service Date: 2022   Document: [id number]

## 2022-01-03 NOTE — PROGRESS NOTES
Service Date: 01/03/2022    CARDIOLOGY OFFICE NOTE    OFFICE NOTE:  Aiden Almaraz is a delightful, 66-year-old gentleman.  Please see my copious previous notes.  We have checked this gentleman from head to toe in terms of heart failure with an MRI of the heart, echoes, heart caths, left and right.  My feeling was he had heart failure with preserved ejection fraction due to noncompliance of the left ventricle and perhaps noncompliance of the arteries with ventricular arterial noncompliance.  We had documented intermittent hypoxia at night, and he has known sleep apnea.  My assumption was that this was all a combination of metabolic syndrome with impaired fasting glucose with impaired relaxation of the left ventricle, coupled with sleep apnea and overnight hypoxia.  He went to [facility] and had extensive testing, which we reviewed today.  Today's visit was literally 59 minutes to review all the data.  [facility] came to the same conclusion that this was shortness of breath due to elevated BMI and deconditioning with an element of sleep apnea and prolonged episodes of hypoxia; in fact, I believe he had over 180 minutes of hypoxia over his sleep study.  In fact, he told me that the [facility] used the same example I did about a noncompliant ventricle being like a brand-new balloon when one is trying to blow air into the balloon, but it is resisting because it is so stiff, but once the air is in there, the balloon flies off into the air.  They did not change any of the medications we had him on. They are recommending he see a dietitian.  I am going to go ahead and follow up with a hemoglobin A1c because they started him on metformin.  I am almost certain what they really wanted to do was place him on SGLT2 medication, but his hemoglobin A1c was not quite high enough to justify that, so they went with metformin.  I think that they are coming to the exact same conclusion I have, and the patient now realizes that they  were indeed saying the same thing that we have been saying.  We briefly today again talked about gastric bypass, although my concern is that we need to push diet and exercise programs traditionally before we go there, and my concern is that sometimes we are successful with weight loss only to have the weight return.      I briefly talked to him about GLP-1 medications, but I told him that that is more for heart attack prevention rather than heart failure, so if he was going to go on a medicine xxxxxxxxxxxxxxxxx metformin, it probably would be SGLT2, but I think diet and exercise make the most sense.  I am going to go ahead, as I mentioned, and order an electrolyte panel.  His creatinine is mildly elevated, so we have to make sure we do not over diurese him.  We talked about salt and water again today in his diet.  I will get a TSH because it was mildly elevated on a previous test, and we will get a hemoglobin A1c now that he is on metformin.  He will call my office for those results.  I will see him back in 6 months, and we are going to encourage him to see his lung doctor to determine if there is anything more that can be done from the sleep apnea aspect.    Today's visit was 59 minutes, and a lot was reviewed, including the extensive records from [facility], including cardiopulmonary stress test, echocardiogram and sleep studies, which were done at [facility] and with his primary doctor's clinic.    cc:  [place] [address]    Dago Mckeon MD        D: 2022   T: 2022   MT: amparo    Name:     MATY NGUYỄN  MRN:      [id number]        Account:      [id number]   :      1955           Service Date: 2022       Document: [id number]

## 2022-02-10 ENCOUNTER — TELEPHONE (OUTPATIENT)
Dept: CARDIOLOGY | Facility: CLINIC | Age: 67
End: 2022-02-10
Payer: COMMERCIAL

## 2022-02-10 NOTE — TELEPHONE ENCOUNTER
[facility] Call Center    Phone Message    May a detailed message be left on voicemail: yes     Reason for Call: Other: blood pressure -98 /49 patient is concern about his blood pressure being low and is requesting a callback. patient is feeling dizzie      Action Taken: Other: clinical pool    Travel Screening: Not Applicable

## 2022-02-11 ENCOUNTER — MYC MEDICAL ADVICE (OUTPATIENT)
Dept: CARDIOLOGY | Facility: CLINIC | Age: 67
End: 2022-02-11
Payer: COMMERCIAL

## 2022-02-11 DIAGNOSIS — I25.10 CAD (CORONARY ARTERY DISEASE): Primary | ICD-10-CM

## 2022-02-14 NOTE — TELEPHONE ENCOUNTER
[employer] messages received. RN will reply to patient to inquire further about any symptoms and then update message to send to Dr. Mckeon for review.       ADDENDUM (patient's updated response regarding questions sent to him by RN via [employer])  No i have not missed any doses. I can tell when it is low when under 58 feel off just don't feel good felt felt dizzy that was when it was down to 48. Like i said it is not all the time also heart rate is over 110 but systolic is never under 94 most times is around 102--107. just don't know why it is happening. I did stat on the Noom diet the last week of January. but not taking any drug but the one have been on. Hope this answers some questions   Camden Almaraz          (original [employer] message)  Hi Dr. Mckeon   I am seeing my blood pressure is low diastolic pressure. I have seen 114 over 55 heart rate of 109. It has been at 58,60,58,55,53 and 48. The low has been . Heart rate is always over 104-114 when the diastolic is low. I did let a message for you on Thursday after noon. [place] knows but told me to call you because you percribed the meds I am on now and said you know me better then Dr Gutierrez does and only talked to him twice. Thank You Camden Almaraz and I also have a good rapport with you!!             1/3/22 visit Dr. Mckeon  OFFICE NOTE:  Aiden Almaraz is a delightful, 66-year-old gentleman.  Please see my copious previous notes.  We have checked this gentleman from head to toe in terms of heart failure with an MRI of the heart, echoes, heart caths, left and right.  My feeling was he had heart failure with preserved ejection fraction due to noncompliance of the left ventricle and perhaps noncompliance of the arteries with ventricular arterial noncompliance.  We had documented intermittent hypoxia at night, and he has known sleep apnea.  My assumption was that this was all a combination of metabolic syndrome with impaired fasting glucose with impaired relaxation of the left  ventricle, coupled with sleep apnea and overnight hypoxia.  He went to [facility] and had extensive testing, which we reviewed today.  Today's visit was literally 59 minutes to review all the data.  [facility] came to the same conclusion that this was shortness of breath due to elevated BMI and deconditioning with an element of sleep apnea and prolonged episodes of hypoxia; in fact, I believe he had over 180 minutes of hypoxia over his sleep study.  In fact, he told me that the [facility] used the same example I did about a noncompliant ventricle being like a brand-new balloon when one is trying to blow air into the balloon, but it is resisting because it is so stiff, but once the air is in there, the balloon flies off into the air.  They did not change any of the medications we had him on. They are recommending he see a dietitian.  I am going to go ahead and follow up with a hemoglobin A1c because they started him on metformin.  I am almost certain what they really wanted to do was place him on SGLT2 medication, but his hemoglobin A1c was not quite high enough to justify that, so they went with metformin.  I think that they are coming to the exact same conclusion I have, and the patient now realizes that they were indeed saying the same thing that we have been saying.  We briefly today again talked about gastric bypass, although my concern is that we need to push diet and exercise programs traditionally before we go there, and my concern is that sometimes we are successful with weight loss only to have the weight return.       I briefly talked to him about GLP-1 medications, but I told him that that is more for heart attack prevention rather than heart failure, so if he was going to go on a medicine xxxxxxxxxxxxxxxxx metformin, it probably would be SGLT2, but I think diet and exercise make the most sense.  I am going to go ahead, as I mentioned, and order an electrolyte panel.  His creatinine is mildly elevated,  so we have to make sure we do not over diurese him.  We talked about salt and water again today in his diet.  I will get a TSH because it was mildly elevated on a previous test, and we will get a hemoglobin A1c now that he is on metformin.  He will call my office for those results.  I will see him back in 6 months, and we are going to encourage him to see his lung doctor to determine if there is anything more that can be done from the sleep apnea aspect.     Today's visit was 59 minutes, and a lot was reviewed, including the extensive records from [facility], including cardiopulmonary stress test, echocardiogram and sleep studies, which were done at [facility] and with his primary doctor's clinic.     cc:  Calvin Desai MD   [address] [address]     Dago Mckeon MD

## 2022-02-14 NOTE — TELEPHONE ENCOUNTER
RN placed orders for HEBER F/U and updated patient via [employer].     Dr. Mckeon recommendations.    I roseanna find much going on with him   [place]ation roseanna find much going on with him   Numerous tests   Have him see a NP   Maybe too much meds for a non critical condition???

## 2022-02-16 ENCOUNTER — OFFICE VISIT (OUTPATIENT)
Dept: CARDIOLOGY | Facility: CLINIC | Age: 67
End: 2022-02-16
Attending: INTERNAL MEDICINE
Payer: COMMERCIAL

## 2022-02-16 VITALS
HEART RATE: 88 BPM | BODY MASS INDEX: 41.28 KG/M2 | WEIGHT: 278.7 LBS | HEIGHT: 69 IN | DIASTOLIC BLOOD PRESSURE: 66 MMHG | SYSTOLIC BLOOD PRESSURE: 100 MMHG

## 2022-02-16 DIAGNOSIS — R73.09 IMPAIRED GLUCOSE TOLERANCE TEST: ICD-10-CM

## 2022-02-16 DIAGNOSIS — E66.01 MORBID OBESITY (H): ICD-10-CM

## 2022-02-16 DIAGNOSIS — I10 HYPERTENSION, UNSPECIFIED TYPE: Primary | ICD-10-CM

## 2022-02-16 DIAGNOSIS — I25.10 CORONARY ARTERY DISEASE INVOLVING NATIVE CORONARY ARTERY OF NATIVE HEART WITHOUT ANGINA PECTORIS: ICD-10-CM

## 2022-02-16 PROCEDURE — 99214 OFFICE O/P EST MOD 30 MIN: CPT | Performed by: NURSE PRACTITIONER

## 2022-02-16 NOTE — PROGRESS NOTES
Cardiology Clinic Progress Note    Service Date: 02/16/22    Primary Cardiologist: Dr. Mckeon      Reason for Visit: Follow-up  for concerns regarding blood pressure and heart rate    HPI:   I had the pleasure of seeing Mr. Aiden Almaraz in the clinic today and he is a very pleasant 66 year old male with a past medical history notable for    1. Persistent shortness of breath  2. Nonobstructive coronary artery disease.  35% stenosis of the LAD per coronary catheterization  3. MARGIE.  4. Anomalous pulmonary venous return/shunt    Diagnostic   I have personally reviewed the following test.      MRI (7/2021)  Normal left  ventricular size and systolic function. Mildly dilated right ventricle with mildly reduced systolic  function. No evidence of significant intra-cardiac shunting on phase contrast analysis. Normal pulmonary venous drainage.  Late gadolinium enhancement imaging demonstrates a small area of mid-wall enhancement involving the mid inferoseptal wall at  the RV insertion point. This is a non-specific pattern of enhancement that has been described in the setting of pulmonary hypertension.    MRA (7/2021) Normal left  ventricular size and systolic function. Mildly dilated right ventricle with mildly reduced systolic  function.  No evidence of significant intra-cardiac shunting on phase contrast analysis. Normal pulmonary venous drainage.  Late gadolinium enhancement imaging demonstrates a small area of mid-wall enhancement involving the mid inferoseptal wall at  the RV insertion point. This is a non-specific pattern of enhancement that has been described in the setting of pulmonary hypertension.       In comparison to the previous report dated  05/21/2020, the findings appear similar.      In August 2021, patient's Anni and is tolerating his vasodilators his hydralazine was increased to 20 mg 3 times daily and his torsemide was increased to 20 mg daily.  He was referred back to [facility].  His carvedilol  "was discontinued    In January 2022, patient met with Dr. Mckeon they reviewed the [facility]'s testing and found that patient's shortness of breath was due to elevated BMI and deconditioning with an element of sleep apnea and prolonged episodes of hypoxia.  In addition he is a noncompliant ventricle described as being like a brand-new balloon when 1 is trying to blow air into it is resistant because it is so stiff but once air is in the balloon flies off into the air.  There was no medication changes.  He was recommended to see a nutritionist and discussed gastric bypass.    Today, he reports having lost 12# in 2 weeks with the Noom diet.  He is checking his blood pressure after he takes his blood pressure and is \"normal\" and is 110-114/66 mmHg.   At the orthopedic MD his blood pressures have been soft and his heart rates have been elevated.     He has been recommended by pharmacy to hold his hydralazine but he continued to take it until he came here.  Typically he does not wear his compression stockings due to the difficulty of putting them on.  He denies chest pain, orthopnea, PND, palpitations, dizziness, presyncope, or syncope.   Reports taking medications as prescribed    ASSESSMENT AND PLAN:    Nonobstructive coronary artery disease.    35% stenosis of the LAD and 10% stenosis of the RCA per angiogram 2016.    Cardiac MRI (7/2021) was negative for ischemia    Denies chest pain    Heart failure with preserved ejection fraction    EF 67% on MRA (7/2/2021)  Patient continues to have shortness of breath with no improvement despite 12 point weight loss    He has lower extremity edema does not wear his compression stockings     Dyspnea on exertion mild orthopnea    Possible cor pulmonale/post capillary pulmonary hypertension    Thought to be due to BMI of 41 and deconditioning      Hypertension    soft     Stop hydralazine     Dilated aorta    4.2 cm been back to 2015    Sleep apnea    Uses CPAP    Concussion " with traumatic brain injury    Noted to have memory issues    Obesity    BMI= 41      Plan:    Stop hydralazine    Follow up with Dr Mckeon in June 2022    Please call the clinic if questions or problems arise      Thank you for the opportunity to participate in this pleasant patient's care. We would be happy to see him sooner if needed for any concerns in the meantime.         CHRISTIANE [facility]  Text Page  (M-F 8:00 am - 4:30 pm)    Orders this Visit:  No orders of the defined types were placed in this encounter.    Orders Placed This Encounter   Medications     metFORMIN (GLUCOPHAGE) 500 MG tablet     Sig: Take 500 mg by mouth daily (with breakfast)      Medications Discontinued During This Encounter   Medication Reason     potassium chloride ER (KLOR-CON M) 10 MEQ CR tablet Medication Reconciliation Clean Up     cetirizine (ZYRTEC) 10 MG tablet Medication Reconciliation Clean Up     loperamide (IMODIUM) 2 MG capsule Medication Reconciliation Clean Up     colestipol (COLESTID) 1 G tablet Medication Reconciliation Clean Up     celecoxib (CELEBREX) 200 MG capsule Discontinued by another Health Care Provider     hydrALAZINE (APRESOLINE) 10 MG tablet      Encounter Diagnosis   Name Primary?     CAD (coronary artery disease)        CURRENT MEDICATIONS:  Current Outpatient Medications   Medication Sig Dispense Refill     acetaminophen (TYLENOL) 500 MG tablet Take 500-1,000 mg by mouth every 6 hours as needed for mild pain       aspirin 81 MG tablet Take 1 tablet (81 mg) by mouth daily 30 tablet      atorvastatin (LIPITOR) 20 MG tablet Take 1 tablet (20 mg) by mouth daily 90 tablet 3     finasteride (PROSCAR) 5 MG tablet Take 5 mg by mouth daily        lisinopril (ZESTRIL) 5 MG tablet Take 1 tablet (5 mg) by mouth daily Hold if Systolic Blood Pressure is less than 85. 30 tablet 3     meclizine 25 MG CHEW Take 25 mg by mouth every 6 hours as needed for dizziness       metFORMIN (GLUCOPHAGE) 500 MG  "tablet Take 500 mg by mouth daily (with breakfast)        omeprazole (PRILOSEC) 20 MG DR capsule Take 20 mg by mouth daily as needed        sertraline (ZOLOFT) 100 MG tablet Take 100 mg by mouth daily       sertraline (ZOLOFT) 50 MG tablet Take 50 mg by mouth daily       spironolactone (ALDACTONE) 25 MG tablet Take 25 mg by mouth daily        torsemide (DEMADEX) 20 MG tablet Take 20 mg by mouth daily 1 tab daily 135 tablet 3     vitamin D3 (CHOLECALCIFEROL) 10834 UNITS capsule Take 1,000 Units by mouth 2 times daily          ALLERGIES  Allergies   Allergen Reactions     Seasonal Allergies        PAST MEDICAL, SURGICAL, SOCIAL FAMILY HISTORY:  History was reviewed and updated as needed, see medical record.    Review of Systems:  Skin:  Negative     Eyes:  Positive for glasses;double vision  ENT:  Positive for sinus trouble;postnasal drainage  Respiratory:  Positive for dyspnea on exertion;dyspnea at rest;sleep apnea;CPAP  Cardiovascular:    Positive for;lightheadedness;dizziness;edema;fatigue  Gastroenterology: Positive for heartburn  Genitourinary:  Positive for incontinence  Musculoskeletal:  Positive for neck pain;joint pain  Neurologic:  Positive for numbness or tingling of hands;numbness or tingling of feet  Psychiatric:  Positive for sleep disturbances;anxiety;excessive stress;depression  Heme/Lymph/Imm:  Positive for allergies  Endocrine:  Negative       Physical Exam:  Vitals: /66   Pulse 88   Ht 1.753 m (5' 9\")   Wt 126.4 kg (278 lb 11.2 oz)   BMI 41.16 kg/m     Wt Readings from Last 4 Encounters:   02/16/22 126.4 kg (278 lb 11.2 oz)   01/03/22 131.9 kg (290 lb 11.2 oz)   10/05/21 131 kg (288 lb 11.2 oz)   08/27/21 128.4 kg (283 lb)     CONSTITUTIONAL: Appears his stated age, well nourished, and in no acute distress.  HEENT: Pupils equal, round. Sclerae nonicteric.    NECK: Supple, no masses appreciated.   C/V:  Regular rate and rhythm, normal S1 and S2, no S3 or S4, no murmur, rub or gallop. "   RESP: Respirations are unlabored. Lungs are clear to auscultation bilaterally without wheezing, rales, or rhonchi.  GI: Abdomen soft, non-tender, non-distended.  EXTREM:  No clubbing, cyanosis, or 2+ lower extremity edema bilaterally.   NEURO: Alert and oriented, cooperative. Gait steady. No gross focal deficits.   PSYCH: Affect appropriate. Mentation normal. Responds to questions appropriately.  SKIN: Warm and dry. No apparent rashes or bruising.     Recent Lab Results:  CBC RESULTS:  Lab Results   Component Value Date    WBC 8.4 07/29/2021    WBC 6.9 07/09/2020    RBC 4.56 07/29/2021    RBC 4.58 07/09/2020    HGB 14.3 07/29/2021    HGB 14.2 07/09/2020    HCT 42.0 07/29/2021    HCT 42.9 07/09/2020    MCV 92 07/29/2021    MCV 94 07/09/2020    MCH 31.4 07/29/2021    MCH 31.0 07/09/2020    MCHC 34.0 07/29/2021    MCHC 33.1 07/09/2020    RDW 13.7 07/29/2021    RDW 13.7 07/09/2020     07/29/2021     (L) 07/09/2020     BMP RESULTS:  Lab Results   Component Value Date     01/03/2022     06/21/2021    POTASSIUM 4.2 01/03/2022    POTASSIUM 4.2 06/21/2021    CHLORIDE 109 01/03/2022    CHLORIDE 107 06/21/2021    CO2 23 01/03/2022    CO2 24 06/21/2021    ANIONGAP 7 01/03/2022    ANIONGAP 5 06/21/2021     (H) 01/03/2022     (H) 06/21/2021    BUN 32 (H) 01/03/2022    BUN 32 (H) 06/21/2021    CR 1.51 (H) 01/03/2022    CR 1.30 (H) 06/21/2021    GFRESTIMATED 50 (L) 01/03/2022    GFRESTIMATED 57 (L) 06/21/2021    GFRESTBLACK 66 06/21/2021    MARGA 9.1 01/03/2022    MARGA 8.8 06/21/2021      CC  Dago Mckeon MD  [address]  [place]  [place] [address]    This note was completed in part using Dragon voice recognition software. Although reviewed after completion, some word and grammatical errors may occur.

## 2022-02-16 NOTE — LETTER
2/16/2022    Calvin Desai  [facility] [address] [address]    RE: Aiden Almaraz       Dear Colleague,     I had the pleasure of seeing Aiden Almaraz in the [facility].      Cardiology Clinic Progress Note    Service Date: 02/16/22    Primary Cardiologist: Dr. Mckeon      Reason for Visit: Follow-up  for concerns regarding blood pressure and heart rate    HPI:   I had the pleasure of seeing . Aiden Almaraz in the clinic today and he is a very pleasant 66 year old male with a past medical history notable for    1. Persistent shortness of breath  2. Nonobstructive coronary artery disease.  35% stenosis of the LAD per coronary catheterization  3. MARGIE.  4. Anomalous pulmonary venous return/shunt    Diagnostic   I have personally reviewed the following test.      MRI (7/2021)  Normal left  ventricular size and systolic function. Mildly dilated right ventricle with mildly reduced systolic  function. No evidence of significant intra-cardiac shunting on phase contrast analysis. Normal pulmonary venous drainage.  Late gadolinium enhancement imaging demonstrates a small area of mid-wall enhancement involving the mid inferoseptal wall at  the RV insertion point. This is a non-specific pattern of enhancement that has been described in the setting of pulmonary hypertension.    MRA (7/2021) Normal left  ventricular size and systolic function. Mildly dilated right ventricle with mildly reduced systolic  function.  No evidence of significant intra-cardiac shunting on phase contrast analysis. Normal pulmonary venous drainage.  Late gadolinium enhancement imaging demonstrates a small area of mid-wall enhancement involving the mid inferoseptal wall at  the RV insertion point. This is a non-specific pattern of enhancement that has been described in the setting of pulmonary hypertension.       In comparison to the previous report dated  05/21/2020, the findings appear similar.  "     In August 2021, patient's Anni and is tolerating his vasodilators his hydralazine was increased to 20 mg 3 times daily and his torsemide was increased to 20 mg daily.  He was referred back to [facility].  His carvedilol was discontinued    In January 2022, patient met with Dr. Mckeon they reviewed the [facility]'s testing and found that patient's shortness of breath was due to elevated BMI and deconditioning with an element of sleep apnea and prolonged episodes of hypoxia.  In addition he is a noncompliant ventricle described as being like a brand-new balloon when 1 is trying to blow air into it is resistant because it is so stiff but once air is in the balloon flies off into the air.  There was no medication changes.  He was recommended to see a nutritionist and discussed gastric bypass.    Today, he reports having lost 12# in 2 weeks with the Noom diet.  He is checking his blood pressure after he takes his blood pressure and is \"normal\" and is 110-114/66 mmHg.   At the orthopedic MD his blood pressures have been soft and his heart rates have been elevated.     He has been recommended by pharmacy to hold his hydralazine but he continued to take it until he came here.  Typically he does not wear his compression stockings due to the difficulty of putting them on.  He denies chest pain, orthopnea, PND, palpitations, dizziness, presyncope, or syncope.   Reports taking medications as prescribed    ASSESSMENT AND PLAN:    Nonobstructive coronary artery disease.    35% stenosis of the LAD and 10% stenosis of the RCA per angiogram 2016.    Cardiac MRI (7/2021) was negative for ischemia    Denies chest pain    Heart failure with preserved ejection fraction    EF 67% on MRA (7/2/2021)  Patient continues to have shortness of breath with no improvement despite 12 point weight loss    He has lower extremity edema does not wear his compression stockings     Dyspnea on exertion mild orthopnea    Possible cor " pulmonale/post capillary pulmonary hypertension    Thought to be due to BMI of 41 and deconditioning      Hypertension    soft     Stop hydralazine     Dilated aorta    4.2 cm been back to 2015    Sleep apnea    Uses CPAP    Concussion with traumatic brain injury    Noted to have memory issues    Obesity    BMI= 41      Plan:    Stop hydralazine    Follow up with Dr Mckeon in June 2022    Please call the clinic if questions or problems arise      Thank you for the opportunity to participate in this pleasant patient's care. We would be happy to see him sooner if needed for any concerns in the meantime.         CHRISTIANE [facility]  Text Page  (M-F 8:00 am - 4:30 pm)    Orders this Visit:  No orders of the defined types were placed in this encounter.    Orders Placed This Encounter   Medications     metFORMIN (GLUCOPHAGE) 500 MG tablet     Sig: Take 500 mg by mouth daily (with breakfast)      Medications Discontinued During This Encounter   Medication Reason     potassium chloride ER (KLOR-CON M) 10 MEQ CR tablet Medication Reconciliation Clean Up     cetirizine (ZYRTEC) 10 MG tablet Medication Reconciliation Clean Up     loperamide (IMODIUM) 2 MG capsule Medication Reconciliation Clean Up     colestipol (COLESTID) 1 G tablet Medication Reconciliation Clean Up     celecoxib (CELEBREX) 200 MG capsule Discontinued by another Health Care Provider     hydrALAZINE (APRESOLINE) 10 MG tablet      Encounter Diagnosis   Name Primary?     CAD (coronary artery disease)        CURRENT MEDICATIONS:  Current Outpatient Medications   Medication Sig Dispense Refill     acetaminophen (TYLENOL) 500 MG tablet Take 500-1,000 mg by mouth every 6 hours as needed for mild pain       aspirin 81 MG tablet Take 1 tablet (81 mg) by mouth daily 30 tablet      atorvastatin (LIPITOR) 20 MG tablet Take 1 tablet (20 mg) by mouth daily 90 tablet 3     finasteride (PROSCAR) 5 MG tablet Take 5 mg by mouth daily        lisinopril  "(ZESTRIL) 5 MG tablet Take 1 tablet (5 mg) by mouth daily Hold if Systolic Blood Pressure is less than 85. 30 tablet 3     meclizine 25 MG CHEW Take 25 mg by mouth every 6 hours as needed for dizziness       metFORMIN (GLUCOPHAGE) 500 MG tablet Take 500 mg by mouth daily (with breakfast)        omeprazole (PRILOSEC) 20 MG DR capsule Take 20 mg by mouth daily as needed        sertraline (ZOLOFT) 100 MG tablet Take 100 mg by mouth daily       sertraline (ZOLOFT) 50 MG tablet Take 50 mg by mouth daily       spironolactone (ALDACTONE) 25 MG tablet Take 25 mg by mouth daily        torsemide (DEMADEX) 20 MG tablet Take 20 mg by mouth daily 1 tab daily 135 tablet 3     vitamin D3 (CHOLECALCIFEROL) 68567 UNITS capsule Take 1,000 Units by mouth 2 times daily          ALLERGIES  Allergies   Allergen Reactions     Seasonal Allergies        PAST MEDICAL, SURGICAL, SOCIAL FAMILY HISTORY:  History was reviewed and updated as needed, see medical record.    Review of Systems:  Skin:  Negative     Eyes:  Positive for glasses;double vision  ENT:  Positive for sinus trouble;postnasal drainage  Respiratory:  Positive for dyspnea on exertion;dyspnea at rest;sleep apnea;CPAP  Cardiovascular:    Positive for;lightheadedness;dizziness;edema;fatigue  Gastroenterology: Positive for heartburn  Genitourinary:  Positive for incontinence  Musculoskeletal:  Positive for neck pain;joint pain  Neurologic:  Positive for numbness or tingling of hands;numbness or tingling of feet  Psychiatric:  Positive for sleep disturbances;anxiety;excessive stress;depression  Heme/Lymph/Imm:  Positive for allergies  Endocrine:  Negative       Physical Exam:  Vitals: /66   Pulse 88   Ht 1.753 m (5' 9\")   Wt 126.4 kg (278 lb 11.2 oz)   BMI 41.16 kg/m     Wt Readings from Last 4 Encounters:   02/16/22 126.4 kg (278 lb 11.2 oz)   01/03/22 131.9 kg (290 lb 11.2 oz)   10/05/21 131 kg (288 lb 11.2 oz)   08/27/21 128.4 kg (283 lb)     CONSTITUTIONAL: Appears his " stated age, well nourished, and in no acute distress.  HEENT: Pupils equal, round. Sclerae nonicteric.    NECK: Supple, no masses appreciated.   C/V:  Regular rate and rhythm, normal S1 and S2, no S3 or S4, no murmur, rub or gallop.   RESP: Respirations are unlabored. Lungs are clear to auscultation bilaterally without wheezing, rales, or rhonchi.  GI: Abdomen soft, non-tender, non-distended.  EXTREM:  No clubbing, cyanosis, or 2+ lower extremity edema bilaterally.   NEURO: Alert and oriented, cooperative. Gait steady. No gross focal deficits.   PSYCH: Affect appropriate. Mentation normal. Responds to questions appropriately.  SKIN: Warm and dry. No apparent rashes or bruising.     Recent Lab Results:  CBC RESULTS:  Lab Results   Component Value Date    WBC 8.4 07/29/2021    WBC 6.9 07/09/2020    RBC 4.56 07/29/2021    RBC 4.58 07/09/2020    HGB 14.3 07/29/2021    HGB 14.2 07/09/2020    HCT 42.0 07/29/2021    HCT 42.9 07/09/2020    MCV 92 07/29/2021    MCV 94 07/09/2020    MCH 31.4 07/29/2021    MCH 31.0 07/09/2020    MCHC 34.0 07/29/2021    MCHC 33.1 07/09/2020    RDW 13.7 07/29/2021    RDW 13.7 07/09/2020     07/29/2021     (L) 07/09/2020     BMP RESULTS:  Lab Results   Component Value Date     01/03/2022     06/21/2021    POTASSIUM 4.2 01/03/2022    POTASSIUM 4.2 06/21/2021    CHLORIDE 109 01/03/2022    CHLORIDE 107 06/21/2021    CO2 23 01/03/2022    CO2 24 06/21/2021    ANIONGAP 7 01/03/2022    ANIONGAP 5 06/21/2021     (H) 01/03/2022     (H) 06/21/2021    BUN 32 (H) 01/03/2022    BUN 32 (H) 06/21/2021    CR 1.51 (H) 01/03/2022    CR 1.30 (H) 06/21/2021    GFRESTIMATED 50 (L) 01/03/2022    GFRESTIMATED 57 (L) 06/21/2021    GFRESTBLACK 66 06/21/2021    MARGA 9.1 01/03/2022    MARGA 8.8 06/21/2021          This note was completed in part using Dragon voice recognition software. Although reviewed after completion, some word and grammatical errors may occur.      Thank you for  allowing me to participate in the care of your patient.      Sincerely,     CH[facility]STIANE [facility]  cc:   Dago Mckeon MD  [address]  [place] [id number]

## 2022-04-17 ENCOUNTER — HEALTH MAINTENANCE LETTER (OUTPATIENT)
Age: 67
End: 2022-04-17

## 2022-06-29 ENCOUNTER — OFFICE VISIT (OUTPATIENT)
Dept: CARDIOLOGY | Facility: CLINIC | Age: 67
End: 2022-06-29
Attending: INTERNAL MEDICINE
Payer: COMMERCIAL

## 2022-06-29 ENCOUNTER — LAB (OUTPATIENT)
Dept: LAB | Facility: CLINIC | Age: 67
End: 2022-06-29
Payer: COMMERCIAL

## 2022-06-29 VITALS
BODY MASS INDEX: 39.14 KG/M2 | DIASTOLIC BLOOD PRESSURE: 70 MMHG | HEART RATE: 60 BPM | SYSTOLIC BLOOD PRESSURE: 98 MMHG | HEIGHT: 69 IN | OXYGEN SATURATION: 98 % | WEIGHT: 264.3 LBS

## 2022-06-29 DIAGNOSIS — I50.30 HEART FAILURE WITH PRESERVED EJECTION FRACTION, NYHA CLASS I (H): ICD-10-CM

## 2022-06-29 DIAGNOSIS — I26.09 ACUTE COR PULMONALE (H): ICD-10-CM

## 2022-06-29 DIAGNOSIS — R73.09 IMPAIRED GLUCOSE TOLERANCE TEST: ICD-10-CM

## 2022-06-29 LAB
ANION GAP SERPL CALCULATED.3IONS-SCNC: 7 MMOL/L (ref 3–14)
BUN SERPL-MCNC: 35 MG/DL (ref 7–30)
CALCIUM SERPL-MCNC: 9 MG/DL (ref 8.5–10.1)
CHLORIDE BLD-SCNC: 106 MMOL/L (ref 94–109)
CO2 SERPL-SCNC: 25 MMOL/L (ref 20–32)
CREAT SERPL-MCNC: 1.14 MG/DL (ref 0.66–1.25)
GFR SERPL CREATININE-BSD FRML MDRD: 70 ML/MIN/1.73M2
GLUCOSE BLD-MCNC: 123 MG/DL (ref 70–99)
HBA1C MFR BLD: 5.6 % (ref 0–5.6)
POTASSIUM BLD-SCNC: 3.9 MMOL/L (ref 3.4–5.3)
SODIUM SERPL-SCNC: 138 MMOL/L (ref 133–144)

## 2022-06-29 PROCEDURE — 36415 COLL VENOUS BLD VENIPUNCTURE: CPT | Performed by: INTERNAL MEDICINE

## 2022-06-29 PROCEDURE — 80048 BASIC METABOLIC PNL TOTAL CA: CPT | Performed by: INTERNAL MEDICINE

## 2022-06-29 PROCEDURE — 83036 HEMOGLOBIN GLYCOSYLATED A1C: CPT | Performed by: NURSE PRACTITIONER

## 2022-06-29 PROCEDURE — 99215 OFFICE O/P EST HI 40 MIN: CPT | Performed by: INTERNAL MEDICINE

## 2022-06-29 RX ORDER — DAPAGLIFLOZIN 10 MG/1
10 TABLET, FILM COATED ORAL DAILY
Qty: 30 TABLET | Refills: 4 | Status: SHIPPED | OUTPATIENT
Start: 2022-06-29 | End: 2022-10-13

## 2022-06-29 RX ORDER — BENZALKONIUM CHLORIDE 1.3 MG/ML
CLOTH TOPICAL
COMMUNITY
Start: 2021-12-31

## 2022-06-29 RX ORDER — TORSEMIDE 10 MG/1
10 TABLET ORAL DAILY
Qty: 90 TABLET | Refills: 3 | COMMUNITY
Start: 2022-06-29 | End: 2023-07-17

## 2022-06-29 RX ORDER — SPIRONOLACTONE 25 MG/1
12.5 TABLET ORAL DAILY
Qty: 50 TABLET | Refills: 3 | COMMUNITY
Start: 2022-06-29 | End: 2022-07-22

## 2022-06-29 NOTE — PROGRESS NOTES
Service Date: 06/29/2022    HISTORY OF PRESENT ILLNESS:  Aiden Almaraz returns for followup.  Please see my long copious notes regarding the issues with Mr. Almaraz.  My full notes were 10/05/2021 and 01/03/2022.  The patient also went to [facility] for the same problem, and by his report and their report, everything that we had suggested they agreed on, including using the same euphemism examples for what the problem is.  Basically, he has shortness of breath, multifactorial including elevated BMI, obstructive sleep apnea of a rather significant degree, on CPAP with a question if the CPAP machine is fully fitting his face or not, and heart failure with preserved ejection fraction.  The [facility] came to the exact same conclusion as we did, and we did heart catheterization, etc.  Since that time, however, the patient has now developed high-grade bladder cancer.  He is going to be going in for followup cystoscopy, etc.  Please see today's lab tests.  His creatinine is now back into the normal range.  He has had some problems with hypotension on some of our medications.  His hydralazine was stopped, and he has been on variable higher and lower dose of spironolactone and lisinopril.  I had talked to him about SGLT2 medications a year ago for heart failure with preserved ejection fraction, and when he went to the [facility] they said the exact same thing, but it was denied by his insurance company because his hemoglobin A1c was not high enough.  I explained to him that these drugs have FDA approval for heart failure with preserved ejection fraction even without diabetes, so we are going to try again.  I am going to prescribe Farxiga 10 mg in the morning.  With this I am going to decrease the Demadex from 20 down to 10.  I am going to stop the lisinopril, and I am going to decrease Aldactone from 25 down to 12.5.  We do note his blood pressure is running a little bit low already at 98/70, so we will watch for that.  We  are going to have him come back in about 2 weeks to see if he is on the Farxiga, and we will repeat the BMP and see how he is doing.  I did show him the Farxiga studies about how it works for preserved ejection fraction by lowering first hospitalizations and actually a slight improvement in long-term mortality.  We cautioned him about the blood sugar and urinary tract infections, especially that he is going to have cystoscopies done.  We have suggested that he hold this drug a couple days before any surgical procedures, especially if he is n.p.o. for them.  We went through potential other side effects today.    Today's visit was actually much longer.  It was a 46 minute visit, but it was all counseling to go through all of this data, again review the [facility] recommendations, and go through this particular drug and the change in medications.  He knows if the Farxiga is not approved, he will call my nurse, Ana Laura, and we will find out if the competing drug from the other company SGLT2 is covered or not (J[employer]iance).  Today's visit was 46 minutes.    Иван Mckeon MD    cc:  [place] [address]    Dago Mckeon MD        D: 2022   T: 2022   MT: laura    Name:     MATY NGUYỄN  MRN:      [id number]        Account:      [id number]   :      1955           Service Date: 2022       Document: [id number]

## 2022-06-29 NOTE — PROGRESS NOTES
HPI and Plan:   See dictation    Orders Placed This Encounter   Procedures     Basic metabolic panel     Follow-Up with Cardiology HEBER     Orders Placed This Encounter   Medications     FLUoxetine (PROZAC) 20 MG capsule     Respiratory Therapy Supplies (CARETOUCH 2 CPAP HOSE ) MISC     Sig: CPAP machine for home use at pressure 15 cmw, full face mask x1/3month with a full face cushion x1/mo     dapagliflozin (FARXIGA) 10 MG TABS tablet     Sig: Take 1 tablet (10 mg) by mouth daily     Dispense:  30 tablet     Refill:  4     spironolactone (ALDACTONE) 25 MG tablet     Sig: Take 0.5 tablets (12.5 mg) by mouth daily     Dispense:  50 tablet     Refill:  3     torsemide (DEMADEX) 10 MG tablet     Sig: Take 1 tablet (10 mg) by mouth daily 1 tab daily     Dispense:  90 tablet     Refill:  3     Medications Discontinued During This Encounter   Medication Reason     finasteride (PROSCAR) 5 MG tablet Medication Reconciliation Clean Up     metFORMIN (GLUCOPHAGE) 500 MG tablet Medication Reconciliation Clean Up     omeprazole (PRILOSEC) 20 MG DR capsule Medication Reconciliation Clean Up     sertraline (ZOLOFT) 100 MG tablet Medication Reconciliation Clean Up     sertraline (ZOLOFT) 50 MG tablet Medication Reconciliation Clean Up     lisinopril (ZESTRIL) 5 MG tablet      spironolactone (ALDACTONE) 25 MG tablet      torsemide (DEMADEX) 20 MG tablet          Encounter Diagnoses   Name Primary?     Heart failure with preserved ejection fraction, NYHA class I (H)      Acute cor pulmonale (H)        CURRENT MEDICATIONS:  Current Outpatient Medications   Medication Sig Dispense Refill     acetaminophen (TYLENOL) 500 MG tablet Take 500-1,000 mg by mouth every 6 hours as needed for mild pain       aspirin 81 MG tablet Take 1 tablet (81 mg) by mouth daily 30 tablet      atorvastatin (LIPITOR) 20 MG tablet Take 1 tablet (20 mg) by mouth daily 90 tablet 3     dapagliflozin (FARXIGA) 10 MG TABS tablet Take 1 tablet (10 mg) by mouth  daily 30 tablet 4     FLUoxetine (PROZAC) 20 MG capsule        meclizine 25 MG CHEW Take 25 mg by mouth every 6 hours as needed for dizziness       Respiratory Therapy Supplies (CARETOUCH 2 CPAP HOSE ) MISC CPAP machine for home use at pressure 15 cmw, full face mask x1/3month with a full face cushion x1/mo       spironolactone (ALDACTONE) 25 MG tablet Take 0.5 tablets (12.5 mg) by mouth daily 50 tablet 3     torsemide (DEMADEX) 10 MG tablet Take 1 tablet (10 mg) by mouth daily 1 tab daily 90 tablet 3     vitamin D3 (CHOLECALCIFEROL) 18007 UNITS capsule Take 1,000 Units by mouth 2 times daily          ALLERGIES     Allergies   Allergen Reactions     Hmg-Coa-R Inhibitors      Seasonal Allergies        PAST MEDICAL HISTORY:  Past Medical History:   Diagnosis Date     Corey's esophagus      Bladder cancer (H) 2022    high grade, followed at [place]     CAD (coronary artery disease) 02/2016    mild, nonobstructive per cath 2016     Concussion 2016    fall went to ER--now with dizzy and memory issue     Degenerative disc disease, lumbar      Depression with anxiety      Diastolic congestive heart failure (H)     non compliant LV, HFpEF     Diastolic heart failure (H)      Gastroesophageal reflux disease 1999    lap nissen at [facility] june 1999     HTN (hypertension)      Hyperlipidemia      IBS (irritable bowel syndrome)     on colestid for diarrhea not cholesterol     Impaired glucose tolerance      Lichen simplex chronicus      Metabolic syndrome      Mild ascending aorta dilation (H)     40mm     Osteomyelitis of right leg (H) 1979     Restrictive lung disease     Dr No, mild restriction PFT     Sleep apnea     cpap     Venous disease     lower extremity       PAST SURGICAL HISTORY:  Past Surgical History:   Procedure Laterality Date     CARPAL TUNNEL RELEASE RT/LT Bilateral      CHOLECYSTECTOMY       CV RIGHT HEART CATH MEASUREMENTS RECORDED Right 7/9/2020    Procedure: Right Heart Cath WITH FULL  "OXIMETRY;  Surgeon: Boaz Bustillo MD;  Location:  HEART CARDIAC CATH LAB     CV RIGHT HEART CATH MEASUREMENTS RECORDED N/A 7/29/2021    Procedure: Right Heart Cath rest and exercise with elana in [place];  Surgeon: Dago Mckeon MD;  Location:  HEART CARDIAC CATH LAB     CV RIGHT HEART EXERCISE STRESS STUDY N/A 7/29/2021    Procedure: Right Heart Exercise Stress Study;  Surgeon: Dago Mckeon MD;  Location:  HEART CARDIAC CATH LAB     KNEE SURGERY      arthroscopic (pt doesn't recall which knee)     NISSEN FUNDOPLICATION       ROTATOR CUFF REPAIR RT/LT Right        FAMILY HISTORY:  Family History   Problem Relation Age of Onset     Other Cancer Mother      Hypertension Mother      Colon Cancer Father      Asthma Father        SOCIAL HISTORY:  Social History     Socioeconomic History     Marital status:      Spouse name: None     Number of children: None     Years of education: None     Highest education level: None   Occupational History     Occupation:      Comment: service food machines   Tobacco Use     Smoking status: Never Smoker     Smokeless tobacco: Never Used   Substance and Sexual Activity     Alcohol use: Yes     Alcohol/week: 0.0 standard drinks     Comment: occ   Other Topics Concern     Caffeine Concern No     Comment: occ     Special Diet No     Exercise No       Review of Systems:  Skin:        Eyes:  Positive for glasses  ENT:  Negative    Respiratory:  Positive for dyspnea on exertion;sleep apnea;CPAP  Cardiovascular:    dizziness;Positive for  Gastroenterology:      Genitourinary:       Musculoskeletal:       Neurologic:       Psychiatric:       Heme/Lymph/Imm:       Endocrine:         Physical Exam:  Vitals: BP 98/70 (BP Location: Right arm, Patient Position: Sitting, Cuff Size: Adult Large)   Pulse 60   Ht 1.753 m (5' 9\")   Wt 119.9 kg (264 lb 4.8 oz)   SpO2 98%   BMI 39.03 kg/m      Constitutional:           Skin:             Head:       "     Eyes:           Lymph:      ENT:           Neck:           Respiratory:            Cardiac:                                                           GI:           Extremities and Muscular Skeletal:                 Neurological:           Psych:         Recent Lab Results:  LIPID RESULTS:  Lab Results   Component Value Date    CHOL 106 06/21/2021    HDL 35 (L) 06/21/2021    LDL 38 06/21/2021    TRIG 164 (H) 06/21/2021       LIVER ENZYME RESULTS:  Lab Results   Component Value Date    ALT 34 06/21/2021       CBC RESULTS:  Lab Results   Component Value Date    WBC 8.4 07/29/2021    WBC 6.9 07/09/2020    RBC 4.56 07/29/2021    RBC 4.58 07/09/2020    HGB 14.3 07/29/2021    HGB 14.2 07/09/2020    HCT 42.0 07/29/2021    HCT 42.9 07/09/2020    MCV 92 07/29/2021    MCV 94 07/09/2020    MCH 31.4 07/29/2021    MCH 31.0 07/09/2020    MCHC 34.0 07/29/2021    MCHC 33.1 07/09/2020    RDW 13.7 07/29/2021    RDW 13.7 07/09/2020     07/29/2021     (L) 07/09/2020       BMP RESULTS:  Lab Results   Component Value Date     06/29/2022     06/21/2021    POTASSIUM 3.9 06/29/2022    POTASSIUM 4.2 06/21/2021    CHLORIDE 106 06/29/2022    CHLORIDE 107 06/21/2021    CO2 25 06/29/2022    CO2 24 06/21/2021    ANIONGAP 7 06/29/2022    ANIONGAP 5 06/21/2021     (H) 06/29/2022     (H) 06/21/2021    BUN 35 (H) 06/29/2022    BUN 32 (H) 06/21/2021    CR 1.14 06/29/2022    CR 1.30 (H) 06/21/2021    GFRESTIMATED 70 06/29/2022    GFRESTIMATED 57 (L) 06/21/2021    GFRESTBLACK 66 06/21/2021    MARGA 9.0 06/29/2022    MARGA 8.8 06/21/2021        A1C RESULTS:  Lab Results   Component Value Date    A1C 5.6 06/29/2022    A1C 5.6 01/26/2016       INR RESULTS:  Lab Results   Component Value Date    INR 1.05 07/29/2021    INR 1.09 07/09/2020    INR 1.09 02/01/2016           CC  Dago Mckeon MD  [address]  [place] [address]

## 2022-06-29 NOTE — LETTER
6/29/2022    Calvin Desai  [address] [address]    RE: Aiden Almaraz       Dear Colleague,     I had the pleasure of seeing Aiden Almaraz in the [facility].  HPI and Plan:   See dictation    Orders Placed This Encounter   Procedures     Basic metabolic panel     Follow-Up with Cardiology HEBER     Orders Placed This Encounter   Medications     FLUoxetine (PROZAC) 20 MG capsule     Respiratory Therapy Supplies (CARETOUCH 2 CPAP HOSE ) MISC     Sig: CPAP machine for home use at pressure 15 cmw, full face mask x1/3month with a full face cushion x1/mo     dapagliflozin (FARXIGA) 10 MG TABS tablet     Sig: Take 1 tablet (10 mg) by mouth daily     Dispense:  30 tablet     Refill:  4     spironolactone (ALDACTONE) 25 MG tablet     Sig: Take 0.5 tablets (12.5 mg) by mouth daily     Dispense:  50 tablet     Refill:  3     torsemide (DEMADEX) 10 MG tablet     Sig: Take 1 tablet (10 mg) by mouth daily 1 tab daily     Dispense:  90 tablet     Refill:  3     Medications Discontinued During This Encounter   Medication Reason     finasteride (PROSCAR) 5 MG tablet Medication Reconciliation Clean Up     metFORMIN (GLUCOPHAGE) 500 MG tablet Medication Reconciliation Clean Up     omeprazole (PRILOSEC) 20 MG DR capsule Medication Reconciliation Clean Up     sertraline (ZOLOFT) 100 MG tablet Medication Reconciliation Clean Up     sertraline (ZOLOFT) 50 MG tablet Medication Reconciliation Clean Up     lisinopril (ZESTRIL) 5 MG tablet      spironolactone (ALDACTONE) 25 MG tablet      torsemide (DEMADEX) 20 MG tablet          Encounter Diagnoses   Name Primary?     Heart failure with preserved ejection fraction, NYHA class I (H)      Acute cor pulmonale (H)        CURRENT MEDICATIONS:  Current Outpatient Medications   Medication Sig Dispense Refill     acetaminophen (TYLENOL) 500 MG tablet Take 500-1,000 mg by mouth every 6 hours as needed for mild pain       aspirin  81 MG tablet Take 1 tablet (81 mg) by mouth daily 30 tablet      atorvastatin (LIPITOR) 20 MG tablet Take 1 tablet (20 mg) by mouth daily 90 tablet 3     dapagliflozin (FARXIGA) 10 MG TABS tablet Take 1 tablet (10 mg) by mouth daily 30 tablet 4     FLUoxetine (PROZAC) 20 MG capsule        meclizine 25 MG CHEW Take 25 mg by mouth every 6 hours as needed for dizziness       Respiratory Therapy Supplies (CARETOUCH 2 CPAP HOSE ) MISC CPAP machine for home use at pressure 15 cmw, full face mask x1/3month with a full face cushion x1/mo       spironolactone (ALDACTONE) 25 MG tablet Take 0.5 tablets (12.5 mg) by mouth daily 50 tablet 3     torsemide (DEMADEX) 10 MG tablet Take 1 tablet (10 mg) by mouth daily 1 tab daily 90 tablet 3     vitamin D3 (CHOLECALCIFEROL) 66257 UNITS capsule Take 1,000 Units by mouth 2 times daily          ALLERGIES     Allergies   Allergen Reactions     Hmg-Coa-R Inhibitors      Seasonal Allergies        PAST MEDICAL HISTORY:  Past Medical History:   Diagnosis Date     Corey's esophagus      Bladder cancer (H) 2022    high grade, followed at [place]     CAD (coronary artery disease) 02/2016    mild, nonobstructive per cath 2016     Concussion 2016    fall went to ER--now with dizzy and memory issue     Degenerative disc disease, lumbar      Depression with anxiety      Diastolic congestive heart failure (H)     non compliant LV, HFpEF     Diastolic heart failure (H)      Gastroesophageal reflux disease 1999    lap nissen at [facility] june 1999     HTN (hypertension)      Hyperlipidemia      IBS (irritable bowel syndrome)     on colestid for diarrhea not cholesterol     Impaired glucose tolerance      Lichen simplex chronicus      Metabolic syndrome      Mild ascending aorta dilation (H)     40mm     Osteomyelitis of right leg (H) 1979     Restrictive lung disease     Dr No, mild restriction PFT     Sleep apnea     cpap     Venous disease     lower extremity       PAST SURGICAL  HISTORY:  Past Surgical History:   Procedure Laterality Date     CARPAL TUNNEL RELEASE RT/LT Bilateral      CHOLECYSTECTOMY       CV RIGHT HEART CATH MEASUREMENTS RECORDED Right 7/9/2020    Procedure: Right Heart Cath WITH FULL OXIMETRY;  Surgeon: Boaz Bustillo MD;  Location:  HEART CARDIAC CATH LAB     CV RIGHT HEART CATH MEASUREMENTS RECORDED N/A 7/29/2021    Procedure: Right Heart Cath rest and exercise with elana in [place];  Surgeon: Dago Mckeon MD;  Location:  HEART CARDIAC CATH LAB     CV RIGHT HEART EXERCISE STRESS STUDY N/A 7/29/2021    Procedure: Right Heart Exercise Stress Study;  Surgeon: Dago Mckeon MD;  Location:  HEART CARDIAC CATH LAB     KNEE SURGERY      arthroscopic (pt doesn't recall which knee)     NISSEN FUNDOPLICATION       ROTATOR CUFF REPAIR RT/LT Right        FAMILY HISTORY:  Family History   Problem Relation Age of Onset     Other Cancer Mother      Hypertension Mother      Colon Cancer Father      Asthma Father        SOCIAL HISTORY:  Social History     Socioeconomic History     Marital status:      Spouse name: None     Number of children: None     Years of education: None     Highest education level: None   Occupational History     Occupation:      Comment: service food machines   Tobacco Use     Smoking status: Never Smoker     Smokeless tobacco: Never Used   Substance and Sexual Activity     Alcohol use: Yes     Alcohol/week: 0.0 standard drinks     Comment: occ   Other Topics Concern     Caffeine Concern No     Comment: occ     Special Diet No     Exercise No       Review of Systems:  Skin:        Eyes:  Positive for glasses  ENT:  Negative    Respiratory:  Positive for dyspnea on exertion;sleep apnea;CPAP  Cardiovascular:    dizziness;Positive for  Gastroenterology:      Genitourinary:       Musculoskeletal:       Neurologic:       Psychiatric:       Heme/Lymph/Imm:       Endocrine:         Physical Exam:  Vitals: BP 98/70 (BP  "Location: Right arm, Patient Position: Sitting, Cuff Size: Adult Large)   Pulse 60   Ht 1.753 m (5' 9\")   Wt 119.9 kg (264 lb 4.8 oz)   SpO2 98%   BMI 39.03 kg/m      Constitutional:           Skin:             Head:           Eyes:           Lymph:      ENT:           Neck:           Respiratory:            Cardiac:                                                           GI:           Extremities and Muscular Skeletal:                 Neurological:           Psych:         Recent Lab Results:  LIPID RESULTS:  Lab Results   Component Value Date    CHOL 106 06/21/2021    HDL 35 (L) 06/21/2021    LDL 38 06/21/2021    TRIG 164 (H) 06/21/2021       LIVER ENZYME RESULTS:  Lab Results   Component Value Date    ALT 34 06/21/2021       CBC RESULTS:  Lab Results   Component Value Date    WBC 8.4 07/29/2021    WBC 6.9 07/09/2020    RBC 4.56 07/29/2021    RBC 4.58 07/09/2020    HGB 14.3 07/29/2021    HGB 14.2 07/09/2020    HCT 42.0 07/29/2021    HCT 42.9 07/09/2020    MCV 92 07/29/2021    MCV 94 07/09/2020    MCH 31.4 07/29/2021    MCH 31.0 07/09/2020    MCHC 34.0 07/29/2021    MCHC 33.1 07/09/2020    RDW 13.7 07/29/2021    RDW 13.7 07/09/2020     07/29/2021     (L) 07/09/2020       BMP RESULTS:  Lab Results   Component Value Date     06/29/2022     06/21/2021    POTASSIUM 3.9 06/29/2022    POTASSIUM 4.2 06/21/2021    CHLORIDE 106 06/29/2022    CHLORIDE 107 06/21/2021    CO2 25 06/29/2022    CO2 24 06/21/2021    ANIONGAP 7 06/29/2022    ANIONGAP 5 06/21/2021     (H) 06/29/2022     (H) 06/21/2021    BUN 35 (H) 06/29/2022    BUN 32 (H) 06/21/2021    CR 1.14 06/29/2022    CR 1.30 (H) 06/21/2021    GFRESTIMATED 70 06/29/2022    GFRESTIMATED 57 (L) 06/21/2021    GFRESTBLACK 66 06/21/2021    MARGA 9.0 06/29/2022    MARGA 8.8 06/21/2021        A1C RESULTS:  Lab Results   Component Value Date    A1C 5.6 06/29/2022    A1C 5.6 01/26/2016       INR RESULTS:  Lab Results   Component Value Date    " INR 1.05 07/29/2021    INR 1.09 07/09/2020    INR 1.09 02/01/2016             Dago Mckeon MD  [address]  [place] [address]    Service Date: 06/29/2022    HISTORY OF PRESENT ILLNESS:  Aiden Almaraz returns for followup.  Please see my long copious notes regarding the issues with Mr. Almaraz.  My full notes were 10/05/2021 and 01/03/2022.  The patient also went to [facility] for the same problem, and by his report and their report, everything that we had suggested they agreed on, including using the same euphemism examples for what the problem is.  Basically, he has shortness of breath, multifactorial including elevated BMI, obstructive sleep apnea of a rather significant degree, on CPAP with a question if the CPAP machine is fully fitting his face or not, and heart failure with preserved ejection fraction.  The [facility] came to the exact same conclusion as we did, and we did heart catheterization, etc.  Since that time, however, the patient has now developed high-grade bladder cancer.  He is going to be going in for followup cystoscopy, etc.  Please see today's lab tests.  His creatinine is now back into the normal range.  He has had some problems with hypotension on some of our medications.  His hydralazine was stopped, and he has been on variable higher and lower dose of spironolactone and lisinopril.  I had talked to him about SGLT2 medications a year ago for heart failure with preserved ejection fraction, and when he went to the [facility] they said the exact same thing, but it was denied by his insurance company because his hemoglobin A1c was not high enough.  I explained to him that these drugs have FDA approval for heart failure with preserved ejection fraction even without diabetes, so we are going to try again.  I am going to prescribe Farxiga 10 mg in the morning.  With this I am going to decrease the Demadex from 20 down to 10.  I am going to stop the lisinopril, and I am going to  decrease Aldactone from 25 down to 12.5.  We do note his blood pressure is running a little bit low already at 98/70, so we will watch for that.  We are going to have him come back in about 2 weeks to see if he is on the Farxiga, and we will repeat the BMP and see how he is doing.  I did show him the Farxiga studies about how it works for preserved ejection fraction by lowering first hospitalizations and actually a slight improvement in long-term mortality.  We cautioned him about the blood sugar and urinary tract infections, especially that he is going to have cystoscopies done.  We have suggested that he hold this drug a couple days before any surgical procedures, especially if he is n.p.o. for them.  We went through potential other side effects today.    Today's visit was actually much longer.  It was a 46 minute visit, but it was all counseling to go through all of this data, again review the [facility] recommendations, and go through this particular drug and the change in medications.  He knows if the Farxiga is not approved, he will call my nurse, Ana Laura, and we will find out if the competing drug from the other company SGLT2 is covered or not ([employer]).  Today's visit was 46 minutes.    Иван Mckeon MD    cc:  Calvin Desai MD  [address] [address]    Dago Mckeon MD        D: 2022   T: 2022   MT:     Name:     MATY NGUYỄN  MRN:      [id number]        Account:      [id number]   :      1955           Service Date: 2022       Document: [id number]      Thank you for allowing me to participate in the care of your patient.      Sincerely,     Dago Mckeon MD     [facility]  cc:   Dago Mckeon MD  [address]  [place] [address]

## 2022-07-08 ENCOUNTER — TELEPHONE (OUTPATIENT)
Dept: CARDIOLOGY | Facility: CLINIC | Age: 67
End: 2022-07-08

## 2022-07-08 NOTE — TELEPHONE ENCOUNTER
[facility] Call Center    Phone Message    May a detailed message be left on voicemail: yes     Reason for Call: Medication Refill Request    Has the patient contacted the pharmacy for the refill? Yes   Name of medication being requested: meclizine 25 MG CHEW  Provider who prescribed the medication: Johnna Mckeon  Pharmacy: [facility] [address]  Date medication is needed: ASAP    Pt called stating he is out of his meclizine. Pt usually gets this from PCP but is out of office currently. Please review and call pt back with any further questions. Thank you!       Action Taken: Other: Cardiology    Travel Screening: Not Applicable

## 2022-07-08 NOTE — TELEPHONE ENCOUNTER
RN called patient and left detailed VM advising that unfortunately this is not a cardiac medication so patient will need to have filled through PMD's office. RN suggested to patient in VM he call his PMD's office and request they send to another MD at that clinic for review.

## 2022-07-22 ENCOUNTER — OFFICE VISIT (OUTPATIENT)
Dept: CARDIOLOGY | Facility: CLINIC | Age: 67
End: 2022-07-22

## 2022-07-22 ENCOUNTER — LAB (OUTPATIENT)
Dept: LAB | Facility: CLINIC | Age: 67
End: 2022-07-22
Payer: COMMERCIAL

## 2022-07-22 VITALS
SYSTOLIC BLOOD PRESSURE: 110 MMHG | WEIGHT: 262.7 LBS | BODY MASS INDEX: 38.91 KG/M2 | DIASTOLIC BLOOD PRESSURE: 80 MMHG | HEIGHT: 69 IN | OXYGEN SATURATION: 96 % | HEART RATE: 88 BPM

## 2022-07-22 DIAGNOSIS — I50.30 HEART FAILURE WITH PRESERVED EJECTION FRACTION, NYHA CLASS I (H): ICD-10-CM

## 2022-07-22 DIAGNOSIS — R06.09 DYSPNEA ON EXERTION: ICD-10-CM

## 2022-07-22 DIAGNOSIS — E66.01 MORBID OBESITY (H): ICD-10-CM

## 2022-07-22 DIAGNOSIS — I50.30 HEART FAILURE WITH PRESERVED EJECTION FRACTION, NYHA CLASS I (H): Primary | ICD-10-CM

## 2022-07-22 DIAGNOSIS — G47.30 SLEEP APNEA, UNSPECIFIED TYPE: ICD-10-CM

## 2022-07-22 DIAGNOSIS — I25.10 NON-OCCLUSIVE CORONARY ARTERY DISEASE: ICD-10-CM

## 2022-07-22 LAB
ANION GAP SERPL CALCULATED.3IONS-SCNC: 5 MMOL/L (ref 3–14)
BUN SERPL-MCNC: 27 MG/DL (ref 7–30)
CALCIUM SERPL-MCNC: 9 MG/DL (ref 8.5–10.1)
CHLORIDE BLD-SCNC: 106 MMOL/L (ref 94–109)
CO2 SERPL-SCNC: 29 MMOL/L (ref 20–32)
CREAT SERPL-MCNC: 1.13 MG/DL (ref 0.66–1.25)
GFR SERPL CREATININE-BSD FRML MDRD: 71 ML/MIN/1.73M2
GLUCOSE BLD-MCNC: 121 MG/DL (ref 70–99)
POTASSIUM BLD-SCNC: 3.4 MMOL/L (ref 3.4–5.3)
SODIUM SERPL-SCNC: 140 MMOL/L (ref 133–144)

## 2022-07-22 PROCEDURE — 36415 COLL VENOUS BLD VENIPUNCTURE: CPT

## 2022-07-22 PROCEDURE — 99215 OFFICE O/P EST HI 40 MIN: CPT | Performed by: NURSE PRACTITIONER

## 2022-07-22 PROCEDURE — 80048 BASIC METABOLIC PNL TOTAL CA: CPT

## 2022-07-22 RX ORDER — SPIRONOLACTONE 25 MG/1
25 TABLET ORAL DAILY
Qty: 90 TABLET | Refills: 3 | Status: SHIPPED | OUTPATIENT
Start: 2022-07-22 | End: 2023-07-17

## 2022-07-22 NOTE — PROGRESS NOTES
Cardiology Clinic Progress Note  Aiden Almaraz MRN# [id number]   YOB: 1955 Age: 66 year old   Primary Cardiologist: Dr. Mckeon Reason for visit: Cardiology follow up             Assessment and Plan:   Aiden Almaraz is a very pleasant 66 year old male here for cardiology follow up.     1. Heart failure with preserved ejection fraction - LVEF 67% per cMRI 7/2021  2. Chronic exertional dyspnea - multifactorial   3. Non-occlusive coronary artery disease - 35% stenosis of LAD per coronary angiogram 2016  4. MARGIE - compliant with CPAP  5. Obesity      I saw patient today for cardiology follow up after recent medication changes during OV with Dr. Mckeon ~ 1 month ago. Patient was started on farxiga 10mg daily, torsemide was decreased from 20mg to 10mg daily, lisinopril was stopped and his spironolactone was decreased from 25mg to 12.5mg. He has been feeling well since medication changes, weight is down ~2#. Patient feels his LE edema has worsened and is asking about trialing increase in his spironolactone to 25mg daily. On exam he does have some LE edema (~ +1 bilaterally) his body habitus makes volume assessment difficult. At this point okay with trial of increasing spironolactone to 25mg daily, advised will need repeat BMP in ~ 10-14 days and will plan for follow up at that time.     Support given today, all questions answered.       Changes today: INCREASE spironolactone to 25mg daily     Follow up plan:     Cardiology follow up with me in 2 weeks with labs prior        History of Presenting Illness:    Aiden Almaraz is a very pleasant 66 year old male with a history of heart failure with preserved ejection fraction, chronic exertional dyspnea, MARGIE on CPAP, obesity and high grade bladder cancer.    Primary cardiologist Dr. Mckeon.     Patient has underwent extensive work up for his exertional dyspnea which is felt to be multifactorial.     Echo 6/14/21 showed LVEF 55%, RV mild to moderately dilated, no  "valve disease, ascending aorta mildly dilated (4.2cm).     Right heart catheterization 7/2021 showed mildly elevated right and left sided filling pressures. Cardiac MRI 7/7/21 showed LVEF 67%, RV mildly dilated with RVEF 52%, no significant valve disease. Late gadolinium enhancement imaging demonstrates a small area of mid-wall enhancement involving the mid inferoseptal wall at  the RV insertion point. This is a non-specific pattern of enhancement that has beendescribed in the setting of pulmonary hypertension.    Most recently seen by Dr. Mckeon 6/29/22 at which time he was started on farxiga 10mg daily, torsemide was decreased from 20mg to 10mg daily, lisinopril was stopped and his spironolactone was decreased from 25mg to 12.5mg.     Patient is here today for cardiology follow up.     Patient reports feeling good. Monitoring weights daily a home, states weight has been as low as 158, ranging 158-161#. Started first treatment for his cancer yesterday, which will be weekly for next 6 weeks. Has noted some increase in LE edema. Denies shortness of breath at rest. Exertional dyspnea with most activity, states this is \"about the same\". Denies orthopnea or PND. Compliant with CPAP. Denies abdominal distention/bloating. Denies chest pain or chest tightness. Has some chronic dizziness since concussion in 2019, notes more when tired. Denies any changes or worsening symptoms. Otherwise denies lightheadedness or other presyncopal symptoms. Denies tachycardia or palpitations. Taking medications daily.     Labs today show stable kidney function and electrolytes. Blood pressure 110/80 and HR 88 in clinic today.    Appetite good. Eating most meals at home. Trying to limit salt. Wife not cooking with salt, patient will occasionally add some salt. Has been on a Noom diet, which has been successful for him. 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 Rare alcohol use. Denies tobacco use.         Social History      Social " "History     Socioeconomic History     Marital status:      Spouse name: Not on file     Number of children: Not on file     Years of education: Not on file     Highest education level: Not on file   Occupational History     Occupation:      Comment: service food machines   Tobacco Use     Smoking status: Never Smoker     Smokeless tobacco: Never Used   Substance and Sexual Activity     Alcohol use: Yes     Alcohol/week: 0.0 standard drinks     Comment: occ     Drug use: Not on file     Sexual activity: Not on file   Other Topics Concern     Parent/sibling w/ CABG, MI or angioplasty before 65F 55M? Not Asked      Service Not Asked     Blood Transfusions Not Asked     Caffeine Concern No     Comment: occ     Occupational Exposure Not Asked     Hobby Hazards Not Asked     Sleep Concern Not Asked     Stress Concern Not Asked     Weight Concern Not Asked     Special Diet No     Back Care Not Asked     Exercise No     Bike Helmet Not Asked     Seat Belt Not Asked     Self-Exams Not Asked   Social History Narrative     Not on file     Social Determinants of Health     Financial Resource Strain: Not on file   Food Insecurity: Not on file   Transportation Needs: Not on file   Physical Activity: Not on file   Stress: Not on file   Social Connections: Not on file   Intimate Partner Violence: Not on file   Housing Stability: Not on file          Review of Systems:   Skin:  Negative     Eyes:  Positive for glasses  ENT:  Negative    Respiratory:  Positive for dyspnea on exertion;sleep apnea;CPAP  Cardiovascular:    dizziness;Positive for  Gastroenterology: Positive for heartburn  Genitourinary:  Negative    Musculoskeletal:  Negative    Neurologic:  Negative    Psychiatric:  Negative    Heme/Lymph/Imm:  Negative    Endocrine:  Negative           Physical Exam:   Vitals: /80 (BP Location: Right arm, Patient Position: Sitting, Cuff Size: Adult Large)   Pulse 88   Ht 1.753 m (5' 9\")   Wt " 119.2 kg (262 lb 11.2 oz)   SpO2 96%   BMI 38.79 kg/m     Wt Readings from Last 4 Encounters:   07/22/22 119.2 kg (262 lb 11.2 oz)   06/29/22 119.9 kg (264 lb 4.8 oz)   02/16/22 126.4 kg (278 lb 11.2 oz)   01/03/22 131.9 kg (290 lb 11.2 oz)     GEN: well nourished, in no acute distress.  HEENT:  Pupils equal, round. Sclerae nonicteric.   NECK: Supple, no masses appreciated. Hard to assess JVP due to body habitus  C/V:  Regular rate and rhythm, no murmur, rub or gallop.    RESP: Respirations are unlabored. Clear to auscultation bilaterally without wheezing, rales, or rhonchi.  GI: Abdomen soft, nontender.  EXTREM: +1 bilateral LE edema.  NEURO: Alert and oriented, cooperative.  SKIN: Warm and dry       Data:     LIPID RESULTS:  Lab Results   Component Value Date    CHOL 106 06/21/2021    HDL 35 (L) 06/21/2021    LDL 38 06/21/2021    TRIG 164 (H) 06/21/2021     LIVER ENZYME RESULTS:  Lab Results   Component Value Date    ALT 34 06/21/2021     CBC RESULTS:  Lab Results   Component Value Date    WBC 8.4 07/29/2021    WBC 6.9 07/09/2020    RBC 4.56 07/29/2021    RBC 4.58 07/09/2020    HGB 14.3 07/29/2021    HGB 14.2 07/09/2020    HCT 42.0 07/29/2021    HCT 42.9 07/09/2020    MCV 92 07/29/2021    MCV 94 07/09/2020    MCH 31.4 07/29/2021    MCH 31.0 07/09/2020    MCHC 34.0 07/29/2021    MCHC 33.1 07/09/2020    RDW 13.7 07/29/2021    RDW 13.7 07/09/2020     07/29/2021     (L) 07/09/2020     BMP RESULTS:  Lab Results   Component Value Date     06/29/2022     06/21/2021    POTASSIUM 3.9 06/29/2022    POTASSIUM 4.2 06/21/2021    CHLORIDE 106 06/29/2022    CHLORIDE 107 06/21/2021    CO2 25 06/29/2022    CO2 24 06/21/2021    ANIONGAP 7 06/29/2022    ANIONGAP 5 06/21/2021     (H) 06/29/2022     (H) 06/21/2021    BUN 35 (H) 06/29/2022    BUN 32 (H) 06/21/2021    CR 1.14 06/29/2022    CR 1.30 (H) 06/21/2021    GFRESTIMATED 70 06/29/2022    GFRESTIMATED 57 (L) 06/21/2021    GFRESTBLACK 66  06/21/2021    MARGA 9.0 06/29/2022    MARGA 8.8 06/21/2021      A1C RESULTS:  Lab Results   Component Value Date    A1C 5.6 06/29/2022    A1C 5.6 01/26/2016     INR RESULTS:  Lab Results   Component Value Date    INR 1.05 07/29/2021    INR 1.09 07/09/2020    INR 1.09 02/01/2016          Medications     Current Outpatient Medications   Medication Sig Dispense Refill     acetaminophen (TYLENOL) 500 MG tablet Take 500-1,000 mg by mouth every 6 hours as needed for mild pain       aspirin 81 MG tablet Take 1 tablet (81 mg) by mouth daily 30 tablet      atorvastatin (LIPITOR) 20 MG tablet Take 1 tablet (20 mg) by mouth daily 90 tablet 3     cholecalciferol 50 MCG (2000 UT) tablet Take 2,000 Units by mouth       dapagliflozin (FARXIGA) 10 MG TABS tablet Take 1 tablet (10 mg) by mouth daily 30 tablet 4     FLUoxetine (PROZAC) 20 MG capsule        meclizine 25 MG CHEW Take 25 mg by mouth every 6 hours as needed for dizziness       Respiratory Therapy Supplies (CARETOUCH 2 CPAP HOSE ) MISC CPAP machine for home use at pressure 15 cmw, full face mask x1/3month with a full face cushion x1/mo       spironolactone (ALDACTONE) 25 MG tablet Take 0.5 tablets (12.5 mg) by mouth daily 50 tablet 3     torsemide (DEMADEX) 10 MG tablet Take 1 tablet (10 mg) by mouth daily 1 tab daily 90 tablet 3        Past Medical History     Past Medical History:   Diagnosis Date     Corey's esophagus      Bladder cancer (H) 2022    high grade, followed at [place]     CAD (coronary artery disease) 02/2016    mild, nonobstructive per cath 2016     Concussion 2016    fall went to ER--now with dizzy and memory issue     Degenerative disc disease, lumbar      Depression with anxiety      Diastolic congestive heart failure (H)     non compliant LV, HFpEF     Diastolic heart failure (H)      Gastroesophageal reflux disease 1999    lap nissen at [facility] june 1999     HTN (hypertension)      Hyperlipidemia      IBS (irritable bowel syndrome)      on colestid for diarrhea not cholesterol     Impaired glucose tolerance      Lichen simplex chronicus      Metabolic syndrome      Mild ascending aorta dilation (H)     40mm     Osteomyelitis of right leg (H) 1979     Restrictive lung disease     Dr No, mild restriction PFT     Sleep apnea     cpap     Venous disease     lower extremity     Past Surgical History:   Procedure Laterality Date     CARPAL TUNNEL RELEASE RT/LT Bilateral      CHOLECYSTECTOMY       CV RIGHT HEART CATH MEASUREMENTS RECORDED Right 7/9/2020    Procedure: Right Heart Cath WITH FULL OXIMETRY;  Surgeon: Boaz Bustillo MD;  Location:  HEART CARDIAC CATH LAB     CV RIGHT HEART CATH MEASUREMENTS RECORDED N/A 7/29/2021    Procedure: Right Heart Cath rest and exercise with elana in [place];  Surgeon: Dago Mckeon MD;  Location:  HEART CARDIAC CATH LAB     CV RIGHT HEART EXERCISE STRESS STUDY N/A 7/29/2021    Procedure: Right Heart Exercise Stress Study;  Surgeon: Dago Mckeon MD;  Location:  HEART CARDIAC CATH LAB     KNEE SURGERY      arthroscopic (pt doesn't recall which knee)     NISSEN FUNDOPLICATION       ROTATOR CUFF REPAIR RT/LT Right      Family History   Problem Relation Age of Onset     Other Cancer Mother      Hypertension Mother      Colon Cancer Father      Asthma Father             Allergies   Hmg-coa-r inhibitors and Seasonal allergies    40 minutes spent on the date of the encounter doing chart review, history and exam, documentation and further activities as noted above      CHRISTIANE Sands [facility] HEART CARE  Pager: [phone]

## 2022-07-22 NOTE — PATIENT INSTRUCTIONS
INCREASE spironolactone to 25mg daily (1 tablet)  Monitor weight  Follow up with Yani in 2 weeks with labs prior  Please call with any questions/concerns [phone]

## 2022-07-22 NOTE — LETTER
7/22/2022    Calvin SHRESTHA Giselle  [facility] [address] [place] [id number]    RE: Aiden Almaraz       Dear Colleague,     I had the pleasure of seeing A[facility]az in the [facility].  Cardiology Clinic Progress Note  Aiden Almaraz MRN# [id number]   YOB: 1955 Age: 66 year old   Primary Cardiologist: Dr. Mckeon Reason for visit: Cardiology follow up             Assessment and Plan:   Aiden Almaraz is a very pleasant 66 year old male here for cardiology follow up.     1. Heart failure with preserved ejection fraction - LVEF 67% per cMRI 7/2021  2. Chronic exertional dyspnea - multifactorial   3. Non-occlusive coronary artery disease - 35% stenosis of LAD per coronary angiogram 2016  4. MARGIE - compliant with CPAP  5. Obesity      I saw patient today for cardiology follow up after recent medication changes during OV with Dr. Mckeon ~ 1 month ago. Patient was started on farxiga 10mg daily, torsemide was decreased from 20mg to 10mg daily, lisinopril was stopped and his spironolactone was decreased from 25mg to 12.5mg. He has been feeling well since medication changes, weight is down ~2#. Patient feels his LE edema has worsened and is asking about trialing increase in his spironolactone to 25mg daily. On exam he does have some LE edema (~ +1 bilaterally) his body habitus makes volume assessment difficult. At this point okay with trial of increasing spironolactone to 25mg daily, advised will need repeat BMP in ~ 10-14 days and will plan for follow up at that time.     Support given today, all questions answered.       Changes today: INCREASE spironolactone to 25mg daily     Follow up plan:     Cardiology follow up with me in 2 weeks with labs prior        History of Presenting Illness:    Aiden Almaraz is a very pleasant 66 year old male with a history of heart failure with preserved ejection fraction, chronic exertional dyspnea, MARGIE on CPAP, obesity and high grade  "bladder cancer.    Primary cardiologist Dr. Mckeon.     Patient has underwent extensive work up for his exertional dyspnea which is felt to be multifactorial.     Echo 6/14/21 showed LVEF 55%, RV mild to moderately dilated, no valve disease, ascending aorta mildly dilated (4.2cm).     Right heart catheterization 7/2021 showed mildly elevated right and left sided filling pressures. Cardiac MRI 7/7/21 showed LVEF 67%, RV mildly dilated with RVEF 52%, no significant valve disease. Late gadolinium enhancement imaging demonstrates a small area of mid-wall enhancement involving the mid inferoseptal wall at  the RV insertion point. This is a non-specific pattern of enhancement that has beendescribed in the setting of pulmonary hypertension.    Most recently seen by Dr. Mckeon 6/29/22 at which time he was started on farxiga 10mg daily, torsemide was decreased from 20mg to 10mg daily, lisinopril was stopped and his spironolactone was decreased from 25mg to 12.5mg.     Patient is here today for cardiology follow up.     Patient reports feeling good. Monitoring weights daily a home, states weight has been as low as 158, ranging 158-161#. Started first treatment for his cancer yesterday, which will be weekly for next 6 weeks. Has noted some increase in LE edema. Denies shortness of breath at rest. Exertional dyspnea with most activity, states this is \"about the same\". Denies orthopnea or PND. Compliant with CPAP. Denies abdominal distention/bloating. Denies chest pain or chest tightness. Has some chronic dizziness since concussion in 2019, notes more when tired. Denies any changes or worsening symptoms. Otherwise denies lightheadedness or other presyncopal symptoms. Denies tachycardia or palpitations. Taking medications daily.     Labs today show stable kidney function and electrolytes. Blood pressure 110/80 and HR 88 in clinic today.    Appetite good. Eating most meals at home. Trying to limit salt. Wife not cooking with " salt, patient will occasionally add some salt. Has been on a Noom diet, which has been successful for him. 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 Rare alcohol use. Denies tobacco use.         Social History      Social History     Socioeconomic History     Marital status:      Spouse name: Not on file     Number of children: Not on file     Years of education: Not on file     Highest education level: Not on file   Occupational History     Occupation:      Comment: service food machines   Tobacco Use     Smoking status: Never Smoker     Smokeless tobacco: Never Used   Substance and Sexual Activity     Alcohol use: Yes     Alcohol/week: 0.0 standard drinks     Comment: occ     Drug use: Not on file     Sexual activity: Not on file   Other Topics Concern     Parent/sibling w/ CABG, MI or angioplasty before 65F 55M? Not Asked      Service Not Asked     Blood Transfusions Not Asked     Caffeine Concern No     Comment: occ     Occupational Exposure Not Asked     Hobby Hazards Not Asked     Sleep Concern Not Asked     Stress Concern Not Asked     Weight Concern Not Asked     Special Diet No     Back Care Not Asked     Exercise No     Bike Helmet Not Asked     Seat Belt Not Asked     Self-Exams Not Asked   Social History Narrative     Not on file     Social Determinants of Health     Financial Resource Strain: Not on file   Food Insecurity: Not on file   Transportation Needs: Not on file   Physical Activity: Not on file   Stress: Not on file   Social Connections: Not on file   Intimate Partner Violence: Not on file   Housing Stability: Not on file          Review of Systems:   Skin:  Negative     Eyes:  Positive for glasses  ENT:  Negative    Respiratory:  Positive for dyspnea on exertion;sleep apnea;CPAP  Cardiovascular:    dizziness;Positive for  Gastroenterology: Positive for heartburn  Genitourinary:  Negative    Musculoskeletal:  Negative    Neurologic:  Negative    Psychiatric:   "Negative    Heme/Lymph/Imm:  Negative    Endocrine:  Negative           Physical Exam:   Vitals: /80 (BP Location: Right arm, Patient Position: Sitting, Cuff Size: Adult Large)   Pulse 88   Ht 1.753 m (5' 9\")   Wt 119.2 kg (262 lb 11.2 oz)   SpO2 96%   BMI 38.79 kg/m     Wt Readings from Last 4 Encounters:   07/22/22 119.2 kg (262 lb 11.2 oz)   06/29/22 119.9 kg (264 lb 4.8 oz)   02/16/22 126.4 kg (278 lb 11.2 oz)   01/03/22 131.9 kg (290 lb 11.2 oz)     GEN: well nourished, in no acute distress.  HEENT:  Pupils equal, round. Sclerae nonicteric.   NECK: Supple, no masses appreciated. Hard to assess JVP due to body habitus  C/V:  Regular rate and rhythm, no murmur, rub or gallop.    RESP: Respirations are unlabored. Clear to auscultation bilaterally without wheezing, rales, or rhonchi.  GI: Abdomen soft, nontender.  EXTREM: +1 bilateral LE edema.  NEURO: Alert and oriented, cooperative.  SKIN: Warm and dry       Data:     LIPID RESULTS:  Lab Results   Component Value Date    CHOL 106 06/21/2021    HDL 35 (L) 06/21/2021    LDL 38 06/21/2021    TRIG 164 (H) 06/21/2021     LIVER ENZYME RESULTS:  Lab Results   Component Value Date    ALT 34 06/21/2021     CBC RESULTS:  Lab Results   Component Value Date    WBC 8.4 07/29/2021    WBC 6.9 07/09/2020    RBC 4.56 07/29/2021    RBC 4.58 07/09/2020    HGB 14.3 07/29/2021    HGB 14.2 07/09/2020    HCT 42.0 07/29/2021    HCT 42.9 07/09/2020    MCV 92 07/29/2021    MCV 94 07/09/2020    MCH 31.4 07/29/2021    MCH 31.0 07/09/2020    MCHC 34.0 07/29/2021    MCHC 33.1 07/09/2020    RDW 13.7 07/29/2021    RDW 13.7 07/09/2020     07/29/2021     (L) 07/09/2020     BMP RESULTS:  Lab Results   Component Value Date     06/29/2022     06/21/2021    POTASSIUM 3.9 06/29/2022    POTASSIUM 4.2 06/21/2021    CHLORIDE 106 06/29/2022    CHLORIDE 107 06/21/2021    CO2 25 06/29/2022    CO2 24 06/21/2021    ANIONGAP 7 06/29/2022    ANIONGAP 5 06/21/2021     " (H) 06/29/2022     (H) 06/21/2021    BUN 35 (H) 06/29/2022    BUN 32 (H) 06/21/2021    CR 1.14 06/29/2022    CR 1.30 (H) 06/21/2021    GFRESTIMATED 70 06/29/2022    GFRESTIMATED 57 (L) 06/21/2021    GFRESTBLACK 66 06/21/2021    MARGA 9.0 06/29/2022    MARGA 8.8 06/21/2021      A1C RESULTS:  Lab Results   Component Value Date    A1C 5.6 06/29/2022    A1C 5.6 01/26/2016     INR RESULTS:  Lab Results   Component Value Date    INR 1.05 07/29/2021    INR 1.09 07/09/2020    INR 1.09 02/01/2016          Medications     Current Outpatient Medications   Medication Sig Dispense Refill     acetaminophen (TYLENOL) 500 MG tablet Take 500-1,000 mg by mouth every 6 hours as needed for mild pain       aspirin 81 MG tablet Take 1 tablet (81 mg) by mouth daily 30 tablet      atorvastatin (LIPITOR) 20 MG tablet Take 1 tablet (20 mg) by mouth daily 90 tablet 3     cholecalciferol 50 MCG (2000 UT) tablet Take 2,000 Units by mouth       dapagliflozin (FARXIGA) 10 MG TABS tablet Take 1 tablet (10 mg) by mouth daily 30 tablet 4     FLUoxetine (PROZAC) 20 MG capsule        meclizine 25 MG CHEW Take 25 mg by mouth every 6 hours as needed for dizziness       Respiratory Therapy Supplies (CARETOUCH 2 CPAP HOSE ) MISC CPAP machine for home use at pressure 15 cmw, full face mask x1/3month with a full face cushion x1/mo       spironolactone (ALDACTONE) 25 MG tablet Take 0.5 tablets (12.5 mg) by mouth daily 50 tablet 3     torsemide (DEMADEX) 10 MG tablet Take 1 tablet (10 mg) by mouth daily 1 tab daily 90 tablet 3        Past Medical History     Past Medical History:   Diagnosis Date     Corey's esophagus      Bladder cancer (H) 2022    high grade, followed at [place]     CAD (coronary artery disease) 02/2016    mild, nonobstructive per cath 2016     Concussion 2016    fall went to ER--now with dizzy and memory issue     Degenerative disc disease, lumbar      Depression with anxiety      Diastolic congestive heart failure (H)     non  compliant LV, HFpEF     Diastolic heart failure (H)      Gastroesophageal reflux disease 1999    lap nissen at [facility] june 1999     HTN (hypertension)      Hyperlipidemia      IBS (irritable bowel syndrome)     on colestid for diarrhea not cholesterol     Impaired glucose tolerance      Lichen simplex chronicus      Metabolic syndrome      Mild ascending aorta dilation (H)     40mm     Osteomyelitis of right leg (H) 1979     Restrictive lung disease     Dr No, mild restriction PFT     Sleep apnea     cpap     Venous disease     lower extremity     Past Surgical History:   Procedure Laterality Date     CARPAL TUNNEL RELEASE RT/LT Bilateral      CHOLECYSTECTOMY       CV RIGHT HEART CATH MEASUREMENTS RECORDED Right 7/9/2020    Procedure: Right Heart Cath WITH FULL OXIMETRY;  Surgeon: Boaz Bustillo MD;  Location:  HEART CARDIAC CATH LAB     CV RIGHT HEART CATH MEASUREMENTS RECORDED N/A 7/29/2021    Procedure: Right Heart Cath rest and exercise with elana in [place];  Surgeon: Dago Mckeon MD;  Location:  HEART CARDIAC CATH LAB     CV RIGHT HEART EXERCISE STRESS STUDY N/A 7/29/2021    Procedure: Right Heart Exercise Stress Study;  Surgeon: Dago Mckeon MD;  Location:  HEART CARDIAC CATH LAB     KNEE SURGERY      arthroscopic (pt doesn't recall which knee)     NISSEN FUNDOPLICATION       ROTATOR CUFF REPAIR RT/LT Right      Family History   Problem Relation Age of Onset     Other Cancer Mother      Hypertension Mother      Colon Cancer Father      Asthma Father             Allergies   Hmg-coa-r inhibitors and Seasonal allergies    40 minutes spent on the date of the encounter doing chart review, history and exam, documentation and further activities as noted above      CHRISTIANE Sands CNP  [facility] HEART CARE  Pager: [phone]        Thank you for allowing me to participate in the care of your patient.      Sincerely,     CHRISTIANE Sands CNP      [facility]  cc:   Dago Mckeon MD  [address]  [place] [id number]

## 2022-10-10 NOTE — TELEPHONE ENCOUNTER
Yes vein clinic [facility] or [facility]  
Quality 110: Preventive Care And Screening: Influenza Immunization: Influenza Immunization not Administered because Patient Refused.
Detail Level: Detailed

## 2022-10-13 ENCOUNTER — OFFICE VISIT (OUTPATIENT)
Dept: CARDIOLOGY | Facility: CLINIC | Age: 67
End: 2022-10-13
Attending: NURSE PRACTITIONER
Payer: COMMERCIAL

## 2022-10-13 ENCOUNTER — LAB (OUTPATIENT)
Dept: LAB | Facility: CLINIC | Age: 67
End: 2022-10-13
Payer: COMMERCIAL

## 2022-10-13 VITALS
DIASTOLIC BLOOD PRESSURE: 84 MMHG | SYSTOLIC BLOOD PRESSURE: 122 MMHG | WEIGHT: 265.4 LBS | HEART RATE: 63 BPM | BODY MASS INDEX: 39.31 KG/M2 | HEIGHT: 69 IN | OXYGEN SATURATION: 95 %

## 2022-10-13 DIAGNOSIS — I50.30 HEART FAILURE WITH PRESERVED EJECTION FRACTION, NYHA CLASS I (H): ICD-10-CM

## 2022-10-13 DIAGNOSIS — G47.33 OBSTRUCTIVE SLEEP APNEA SYNDROME: ICD-10-CM

## 2022-10-13 DIAGNOSIS — E66.01 MORBID OBESITY (H): ICD-10-CM

## 2022-10-13 DIAGNOSIS — R06.02 SHORTNESS OF BREATH: Primary | ICD-10-CM

## 2022-10-13 LAB
ANION GAP SERPL CALCULATED.3IONS-SCNC: 11 MMOL/L (ref 7–15)
BUN SERPL-MCNC: 19 MG/DL (ref 8–23)
CALCIUM SERPL-MCNC: 9.1 MG/DL (ref 8.8–10.2)
CHLORIDE SERPL-SCNC: 103 MMOL/L (ref 98–107)
CREAT SERPL-MCNC: 1.2 MG/DL (ref 0.67–1.17)
DEPRECATED HCO3 PLAS-SCNC: 25 MMOL/L (ref 22–29)
GFR SERPL CREATININE-BSD FRML MDRD: 66 ML/MIN/1.73M2
GLUCOSE SERPL-MCNC: 110 MG/DL (ref 70–99)
NT-PROBNP SERPL-MCNC: 50 PG/ML (ref 0–900)
POTASSIUM SERPL-SCNC: 3.9 MMOL/L (ref 3.4–5.3)
SODIUM SERPL-SCNC: 139 MMOL/L (ref 136–145)

## 2022-10-13 PROCEDURE — 83880 ASSAY OF NATRIURETIC PEPTIDE: CPT

## 2022-10-13 PROCEDURE — 36415 COLL VENOUS BLD VENIPUNCTURE: CPT

## 2022-10-13 PROCEDURE — 99214 OFFICE O/P EST MOD 30 MIN: CPT | Performed by: NURSE PRACTITIONER

## 2022-10-13 PROCEDURE — 80048 BASIC METABOLIC PNL TOTAL CA: CPT

## 2022-10-13 RX ORDER — MIRABEGRON 50 MG/1
1 TABLET, EXTENDED RELEASE ORAL DAILY
COMMUNITY
Start: 2022-07-15

## 2022-10-13 RX ORDER — DAPAGLIFLOZIN 10 MG/1
10 TABLET, FILM COATED ORAL DAILY
Qty: 90 TABLET | Refills: 3 | Status: SHIPPED | OUTPATIENT
Start: 2022-10-13 | End: 2023-10-19

## 2022-10-13 NOTE — LETTER
10/13/2022    Calvin W Giselle  [facility] [address] [address]    RE: Aiden Almaraz       Dear Colleague,     I had the pleasure of seeing Aiden Almaraz in the [facility].  Cardiology Clinic Progress Note  Aiden Almaraz MRN# [id number]   YOB: 1955 Age: 66 year old     Reason For Visit: 3 month follow up   Primary Cardiologist:   Dr. Mckeon           History of Presenting Illness:      Aiden Almaraz is a pleasant 66 year old patient who carries a past medical history significant for HFpEF, chronic exertional dyspnea, nonobstructive CAD, obstructive sleep apnea, obesity, chronic dizziness/headache since concussion in 2019 and high-grade bladder cancer currently under treatment with [facility].     He was last seen on 7/22/2022 in follow-up to initiation of Farxiga, reduction in torsemide and spironolactone and stopping of lisinopril. Unfortunately, he developed worsening leg edema and spironolactone was increased to 25 mg daily.  He returns to the office today for a 2-month follow-up.    His primary complaints are exertional dyspnea, headache and fatigue, worsened since starting cancer treatments.  He denies chest pain, palpitations, PND, orthopnea, or syncope.  Upon exam, lungs are clear bilaterally, heart rate and rhythm regular, difficult to assess JVD, obese abdomen, and trace bilateral lower extremity edema.     Last echocardiogram in 2021 showed a normal ejection fraction estimated at 55%, mild to moderately dilated RV, function could not be assessed, no valvular disease, and a mildly dilated ascending aorta measuring 4.2 cm.  A follow-up cardiac MRI showed a mildly dilated RV with mildly reduced systolic function.  No evidence of significant intracardiac shunting on phase-contrast analysis.  Normal pulmonary venous drainage.  Late gadolinium enhancement demonstrated a small area of mid wall enhancement involving the mid inferior septal  wall at the RV insertion point.  The nonspecific pattern of enhancement described in the setting of pulmonary hypertension.    Right heart cath 7/2021 showed mildly elevated right-sided filling pressures, normal PA pressures, mildly elevated left-sided filling pressure and mildly elevated LV filling pressure, reduced cardiac output level.    Blood pressure is well controlled at 122/84  BMP today showed a sodium of 139, potassium 3.9, BUN 19, creatinine 1.20, and GFR 66  BNP today normal at 50  Last lipid panel showed a total cholesterol of 106, HDL 35, LDL 38, and triglycerides 164  Weight 265, unchanged over the last 3 months.  Down approximately 25 pounds since January.    He does not engage in any routine exercise  Follows a low-sodium diet  Remains compliant with all medications.    He is scheduled to follow-up with [facility] in the next month where he will undergo further bladder surgery.                   Assessment and Plan:     1.  HFpEF - Last echocardiogram in 2021 showed a normal ejection fraction estimated at 55%, mild to moderately dilated RV, function could not be assessed, no valvular disease, and a mildly dilated ascending aorta measuring 4.2 cm.  He complains of progressive fatigue over the last few months, likely due to cancer treatment.  However, recommend a follow-up echocardiogram for structural evaluation.  Weights stable, continue to monitor daily.  BNP normal.  Trace edema to bilateral lower extremities, managed well on low-dose torsemide and spironolactone.  Continue dapagliglozin.       2.  Nonobstructive CAD -continue low-dose aspirin and statin  3.  Obstructive sleep apnea -on CPAP  4.  Obesity -encourage exercise and weight loss           Thank you for allowing me to participate in this delightful patient's care. I have recommended he follow up in 3 months with Lucina Singh NP.       CHRISTIANE Estevez CNP         Review of Systems:     Review of Systems:  Skin:        Eyes:        ENT:       Respiratory:  Positive for dyspnea on exertion;sleep apnea;CPAP  Cardiovascular:    Positive for;fatigue;dizziness  Gastroenterology:      Genitourinary:  Positive for    Musculoskeletal:       Neurologic:  Positive for headaches  Psychiatric:       Heme/Lymph/Imm:       Endocrine:                   Physical Exam:     GEN:  In general, this is a obese male in no acute distress.  HEENT:  Pupils equal, round. Sclerae nonicteric. Clear oropharynx. Mucous membranes moist.  NECK: Supple, no masses appreciated. Trachea midline.  Difficult to assess JVD   C/V:  Regular rate and rhythm, no murmur, rub or gallop. No S3 or RV heave.   RESP: Respirations are unlabored. No use of accessory muscles. Clear to auscultation bilaterally without wheezing, rales, or rhonchi.  GI: Obese abdomen soft, nontender  EXTREM: Trace bilateral LE ankle edema. No cyanosis or clubbing.  NEURO: Alert and oriented, cooperative. No obvious focal deficits.   PSYCH: Normal affect.  SKIN: Warm and dry. No rashes or petechiae appreciated.          Past Medical History:     Past Medical History:   Diagnosis Date     Corey's esophagus      Bladder cancer (H) 2022    high grade, followed at [place]     CAD (coronary artery disease) 02/2016    mild, nonobstructive per cath 2016     Concussion 2016    fall went to ER--now with dizzy and memory issue     Degenerative disc disease, lumbar      Depression with anxiety      Diastolic congestive heart failure (H)     non compliant LV, HFpEF     Diastolic heart failure (H)      Gastroesophageal reflux disease 1999    lap nissen at [facility] june 1999     HTN (hypertension)      Hyperlipidemia      IBS (irritable bowel syndrome)     on colestid for diarrhea not cholesterol     Impaired glucose tolerance      Lichen simplex chronicus      Metabolic syndrome      Mild ascending aorta dilation (H)     40mm     Osteomyelitis of right leg (H) 1979     Restrictive lung disease     Dr No, mild  restriction PFT     Sleep apnea     cpap     Venous disease     lower extremity              Past Surgical History:     Past Surgical History:   Procedure Laterality Date     CARPAL TUNNEL RELEASE RT/LT Bilateral      CHOLECYSTECTOMY       CV RIGHT HEART CATH MEASUREMENTS RECORDED Right 7/9/2020    Procedure: Right Heart Cath WITH FULL OXIMETRY;  Surgeon: Boaz Bustillo MD;  Location:  HEART CARDIAC CATH LAB     CV RIGHT HEART CATH MEASUREMENTS RECORDED N/A 7/29/2021    Procedure: Right Heart Cath rest and exercise with elana in [place];  Surgeon: Dago Mckeon MD;  Location:  HEART CARDIAC CATH LAB     CV RIGHT HEART EXERCISE STRESS STUDY N/A 7/29/2021    Procedure: Right Heart Exercise Stress Study;  Surgeon: Dago Mckeon MD;  Location:  HEART CARDIAC CATH LAB     KNEE SURGERY      arthroscopic (pt doesn't recall which knee)     NISSEN FUNDOPLICATION       ROTATOR CUFF REPAIR RT/LT Right               Allergies:   Hmg-coa-r inhibitors and Seasonal allergies       Data:   All laboratory data reviewed:    LAST CHOLESTEROL:  Lab Results   Component Value Date    CHOL 106 06/21/2021     Lab Results   Component Value Date    HDL 35 06/21/2021     Lab Results   Component Value Date    LDL 38 06/21/2021     Lab Results   Component Value Date    TRIG 164 06/21/2021     No results found for: CHOLHDLRATIO    LAST BMP:  Lab Results   Component Value Date     10/13/2022     06/21/2021      Lab Results   Component Value Date    POTASSIUM 3.9 10/13/2022    POTASSIUM 3.4 07/22/2022    POTASSIUM 4.2 06/21/2021     Lab Results   Component Value Date    CHLORIDE 103 10/13/2022    CHLORIDE 106 07/22/2022    CHLORIDE 107 06/21/2021     Lab Results   Component Value Date    MARGA 9.1 10/13/2022    MARGA 8.8 06/21/2021     Lab Results   Component Value Date    CO2 25 10/13/2022    CO2 29 07/22/2022    CO2 24 06/21/2021     Lab Results   Component Value Date    BUN 19.0 10/13/2022    BUN 27 07/22/2022     BUN 32 06/21/2021     Lab Results   Component Value Date    CR 1.20 10/13/2022    CR 1.30 06/21/2021     Lab Results   Component Value Date     10/13/2022     07/22/2022     06/21/2021       LAST CBC:  Lab Results   Component Value Date    WBC 8.4 07/29/2021    WBC 6.9 07/09/2020     Lab Results   Component Value Date    RBC 4.56 07/29/2021    RBC 4.58 07/09/2020     Lab Results   Component Value Date    HGB 14.3 07/29/2021    HGB 14.2 07/09/2020     Lab Results   Component Value Date    HCT 42.0 07/29/2021    HCT 42.9 07/09/2020     Lab Results   Component Value Date    MCV 92 07/29/2021    MCV 94 07/09/2020     Lab Results   Component Value Date    MCH 31.4 07/29/2021    MCH 31.0 07/09/2020     Lab Results   Component Value Date    MCHC 34.0 07/29/2021    MCHC 33.1 07/09/2020     Lab Results   Component Value Date    RDW 13.7 07/29/2021    RDW 13.7 07/09/2020     Lab Results   Component Value Date     07/29/2021     07/09/2020       Thank you for allowing me to participate in the care of your patient.      Sincerely,     CHRISTIANE Estevez CNP     [facility]  cc:   CHRISTIANE Villegas CNP  [address]  [place] [id number]

## 2022-10-13 NOTE — PROGRESS NOTES
Cardiology Clinic Progress Note  Aiden Almaraz MRN# [id number]   YOB: 1955 Age: 66 year old     Reason For Visit: 3 month follow up   Primary Cardiologist:   Dr. Mckeon           History of Presenting Illness:      Aiden Almaraz is a pleasant 66 year old patient who carries a past medical history significant for HFpEF, chronic exertional dyspnea, nonobstructive CAD, obstructive sleep apnea, obesity, chronic dizziness/headache since concussion in 2019 and high-grade bladder cancer currently under treatment with [facility].     He was last seen on 7/22/2022 in follow-up to initiation of Farxiga, reduction in torsemide and spironolactone and stopping of lisinopril. Unfortunately, he developed worsening leg edema and spironolactone was increased to 25 mg daily.  He returns to the office today for a 2-month follow-up.    His primary complaints are exertional dyspnea, headache and fatigue, worsened since starting cancer treatments.  He denies chest pain, palpitations, PND, orthopnea, or syncope.  Upon exam, lungs are clear bilaterally, heart rate and rhythm regular, difficult to assess JVD, obese abdomen, and trace bilateral lower extremity edema.     Last echocardiogram in 2021 showed a normal ejection fraction estimated at 55%, mild to moderately dilated RV, function could not be assessed, no valvular disease, and a mildly dilated ascending aorta measuring 4.2 cm.  A follow-up cardiac MRI showed a mildly dilated RV with mildly reduced systolic function.  No evidence of significant intracardiac shunting on phase-contrast analysis.  Normal pulmonary venous drainage.  Late gadolinium enhancement demonstrated a small area of mid wall enhancement involving the mid inferior septal wall at the RV insertion point.  The nonspecific pattern of enhancement described in the setting of pulmonary hypertension.    Right heart cath 7/2021 showed mildly elevated right-sided filling pressures, normal PA pressures,  mildly elevated left-sided filling pressure and mildly elevated LV filling pressure, reduced cardiac output level.    Blood pressure is well controlled at 122/84  BMP today showed a sodium of 139, potassium 3.9, BUN 19, creatinine 1.20, and GFR 66  BNP today normal at 50  Last lipid panel showed a total cholesterol of 106, HDL 35, LDL 38, and triglycerides 164  Weight 265, unchanged over the last 3 months.  Down approximately 25 pounds since January.    He does not engage in any routine exercise  Follows a low-sodium diet  Remains compliant with all medications.    He is scheduled to follow-up with [facility] in the next month where he will undergo further bladder surgery.                   Assessment and Plan:     1.  HFpEF - Last echocardiogram in 2021 showed a normal ejection fraction estimated at 55%, mild to moderately dilated RV, function could not be assessed, no valvular disease, and a mildly dilated ascending aorta measuring 4.2 cm.  He complains of progressive fatigue over the last few months, likely due to cancer treatment.  However, recommend a follow-up echocardiogram for structural evaluation.  Weights stable, continue to monitor daily.  BNP normal.  Trace edema to bilateral lower extremities, managed well on low-dose torsemide and spironolactone.  Continue dapagliglozin.       2.  Nonobstructive CAD -continue low-dose aspirin and statin  3.  Obstructive sleep apnea -on CPAP  4.  Obesity -encourage exercise and weight loss           Thank you for allowing me to participate in this delightful patient's care. I have recommended he follow up in 3 months with Lucina Singh NP.       CHRISTIANE Estevez CNP         Review of Systems:     Review of Systems:  Skin:        Eyes:       ENT:       Respiratory:  Positive for dyspnea on exertion;sleep apnea;CPAP  Cardiovascular:    Positive for;fatigue;dizziness  Gastroenterology:      Genitourinary:  Positive for    Musculoskeletal:       Neurologic:  Positive  for headaches  Psychiatric:       Heme/Lymph/Imm:       Endocrine:                   Physical Exam:     GEN:  In general, this is a obese male in no acute distress.  HEENT:  Pupils equal, round. Sclerae nonicteric. Clear oropharynx. Mucous membranes moist.  NECK: Supple, no masses appreciated. Trachea midline.  Difficult to assess JVD   C/V:  Regular rate and rhythm, no murmur, rub or gallop. No S3 or RV heave.   RESP: Respirations are unlabored. No use of accessory muscles. Clear to auscultation bilaterally without wheezing, rales, or rhonchi.  GI: Obese abdomen soft, nontender  EXTREM: Trace bilateral LE ankle edema. No cyanosis or clubbing.  NEURO: Alert and oriented, cooperative. No obvious focal deficits.   PSYCH: Normal affect.  SKIN: Warm and dry. No rashes or petechiae appreciated.          Past Medical History:     Past Medical History:   Diagnosis Date     Corey's esophagus      Bladder cancer (H) 2022    high grade, followed at [place]     CAD (coronary artery disease) 02/2016    mild, nonobstructive per cath 2016     Concussion 2016    fall went to ER--now with dizzy and memory issue     Degenerative disc disease, lumbar      Depression with anxiety      Diastolic congestive heart failure (H)     non compliant LV, HFpEF     Diastolic heart failure (H)      Gastroesophageal reflux disease 1999    lap nissen at [facility] june 1999     HTN (hypertension)      Hyperlipidemia      IBS (irritable bowel syndrome)     on colestid for diarrhea not cholesterol     Impaired glucose tolerance      Lichen simplex chronicus      Metabolic syndrome      Mild ascending aorta dilation (H)     40mm     Osteomyelitis of right leg (H) 1979     Restrictive lung disease     Dr No, mild restriction PFT     Sleep apnea     cpap     Venous disease     lower extremity              Past Surgical History:     Past Surgical History:   Procedure Laterality Date     CARPAL TUNNEL RELEASE RT/LT Bilateral       CHOLECYSTECTOMY       CV RIGHT HEART CATH MEASUREMENTS RECORDED Right 7/9/2020    Procedure: Right Heart Cath WITH FULL OXIMETRY;  Surgeon: Boaz Bustillo MD;  Location:  HEART CARDIAC CATH LAB     CV RIGHT HEART CATH MEASUREMENTS RECORDED N/A 7/29/2021    Procedure: Right Heart Cath rest and exercise with elana in [place];  Surgeon: Dago Mckeon MD;  Location:  HEART CARDIAC CATH LAB     CV RIGHT HEART EXERCISE STRESS STUDY N/A 7/29/2021    Procedure: Right Heart Exercise Stress Study;  Surgeon: Dago Mckeon MD;  Location:  HEART CARDIAC CATH LAB     KNEE SURGERY      arthroscopic (pt doesn't recall which knee)     NISSEN FUNDOPLICATION       ROTATOR CUFF REPAIR RT/LT Right               Allergies:   Hmg-coa-r inhibitors and Seasonal allergies       Data:   All laboratory data reviewed:    LAST CHOLESTEROL:  Lab Results   Component Value Date    CHOL 106 06/21/2021     Lab Results   Component Value Date    HDL 35 06/21/2021     Lab Results   Component Value Date    LDL 38 06/21/2021     Lab Results   Component Value Date    TRIG 164 06/21/2021     No results found for: CHOLHDLRATIO    LAST BMP:  Lab Results   Component Value Date     10/13/2022     06/21/2021      Lab Results   Component Value Date    POTASSIUM 3.9 10/13/2022    POTASSIUM 3.4 07/22/2022    POTASSIUM 4.2 06/21/2021     Lab Results   Component Value Date    CHLORIDE 103 10/13/2022    CHLORIDE 106 07/22/2022    CHLORIDE 107 06/21/2021     Lab Results   Component Value Date    MARGA 9.1 10/13/2022    MARGA 8.8 06/21/2021     Lab Results   Component Value Date    CO2 25 10/13/2022    CO2 29 07/22/2022    CO2 24 06/21/2021     Lab Results   Component Value Date    BUN 19.0 10/13/2022    BUN 27 07/22/2022    BUN 32 06/21/2021     Lab Results   Component Value Date    CR 1.20 10/13/2022    CR 1.30 06/21/2021     Lab Results   Component Value Date     10/13/2022     07/22/2022     06/21/2021       LAST  CBC:  Lab Results   Component Value Date    WBC 8.4 07/29/2021    WBC 6.9 07/09/2020     Lab Results   Component Value Date    RBC 4.56 07/29/2021    RBC 4.58 07/09/2020     Lab Results   Component Value Date    HGB 14.3 07/29/2021    HGB 14.2 07/09/2020     Lab Results   Component Value Date    HCT 42.0 07/29/2021    HCT 42.9 07/09/2020     Lab Results   Component Value Date    MCV 92 07/29/2021    MCV 94 07/09/2020     Lab Results   Component Value Date    MCH 31.4 07/29/2021    MCH 31.0 07/09/2020     Lab Results   Component Value Date    MCHC 34.0 07/29/2021    MCHC 33.1 07/09/2020     Lab Results   Component Value Date    RDW 13.7 07/29/2021    RDW 13.7 07/09/2020     Lab Results   Component Value Date     07/29/2021     07/09/2020

## 2022-10-20 ENCOUNTER — HOSPITAL ENCOUNTER (OUTPATIENT)
Dept: CARDIOLOGY | Facility: CLINIC | Age: 67
Discharge: HOME | End: 2022-10-20
Attending: NURSE PRACTITIONER | Admitting: NURSE PRACTITIONER
Payer: COMMERCIAL

## 2022-10-20 DIAGNOSIS — R06.02 SHORTNESS OF BREATH: ICD-10-CM

## 2022-10-20 DIAGNOSIS — I50.30 HEART FAILURE WITH PRESERVED EJECTION FRACTION, NYHA CLASS I (H): ICD-10-CM

## 2022-10-20 LAB — LVEF ECHO: NORMAL

## 2022-10-20 PROCEDURE — 93306 TTE W/DOPPLER COMPLETE: CPT | Mod: 26 | Performed by: INTERNAL MEDICINE

## 2022-10-20 PROCEDURE — 93306 TTE W/DOPPLER COMPLETE: CPT

## 2022-10-23 ENCOUNTER — HEALTH MAINTENANCE LETTER (OUTPATIENT)
Age: 67
End: 2022-10-23

## 2022-10-26 NOTE — LETTER
10/5/2021    Calvin Desai  [address] [address]    RE: Aiden Almaraz       Dear Colleague,    I had the pleasure of seeing Aiden Almaraz in the [facility].    HPI and Plan:   See dictation    Orders Placed This Encounter   Procedures     Follow-Up with Cardiologist     Orders Placed This Encounter   Medications     finasteride (PROSCAR) 5 MG tablet     torsemide (DEMADEX) 20 MG tablet     Si tab daily     Dispense:  135 tablet     Refill:  3     Medications Discontinued During This Encounter   Medication Reason     tamsulosin (FLOMAX) 0.4 MG capsule      torsemide (DEMADEX) 20 MG tablet Reorder         Encounter Diagnosis   Name Primary?     Acute cor pulmonale (H)        CURRENT MEDICATIONS:  Current Outpatient Medications   Medication Sig Dispense Refill     acetaminophen (TYLENOL) 500 MG tablet Take 500-1,000 mg by mouth every 6 hours as needed for mild pain       aspirin 81 MG tablet Take 1 tablet (81 mg) by mouth daily 30 tablet      atorvastatin (LIPITOR) 20 MG tablet Take 1 tablet (20 mg) by mouth daily 90 tablet 3     celecoxib (CELEBREX) 200 MG capsule Take 200 mg by mouth daily       cetirizine (ZYRTEC) 10 MG tablet Take 10 mg by mouth daily       colestipol (COLESTID) 1 G tablet Take 2 g by mouth 2 times daily Takes as needed for loose stools       finasteride (PROSCAR) 5 MG tablet        hydrALAZINE (APRESOLINE) 10 MG tablet Take 2 tablets (20 mg) by mouth 3 times daily Hold systolic bp less than 85 180 tablet 11     lisinopril (ZESTRIL) 5 MG tablet Take 1 tablet (5 mg) by mouth daily Hold if Systolic Blood Pressure is less than 85. 30 tablet 3     loperamide (IMODIUM) 2 MG capsule        meclizine 25 MG CHEW Take 25 mg by mouth every 6 hours as needed for dizziness       omeprazole (PRILOSEC) 20 MG DR capsule        potassium chloride ER (KLOR-CON M) 10 MEQ CR tablet Take 1 tablet (10 mEq) by  mouth 2 times daily (Patient taking differently: Take 10 mEq by mouth 2 times daily Taking one pill) 180 tablet 3     sertraline (ZOLOFT) 100 MG tablet Take 100 mg by mouth daily       sertraline (ZOLOFT) 50 MG tablet Take 50 mg by mouth daily       spironolactone (ALDACTONE) 25 MG tablet Daily       torsemide (DEMADEX) 20 MG tablet 1 tab daily 135 tablet 3     vitamin D3 (CHOLECALCIFEROL) 43017 UNITS capsule Take 1,000 Units by mouth 2 times daily          ALLERGIES     Allergies   Allergen Reactions     Seasonal Allergies        PAST MEDICAL HISTORY:  Past Medical History:   Diagnosis Date     Corey's esophagus      CAD (coronary artery disease) 02/2016    mild, nonobstructive per cath 2016     Concussion 2016    fall went to ER--now with dizzy and memory issue     Degenerative disc disease, lumbar      Depression with anxiety      Diastolic heart failure (H)      Gastroesophageal reflux disease 1999    lap nissen at [facility] june 1999     HTN (hypertension)      Hyperlipidemia      IBS (irritable bowel syndrome)     on colestid for diarrhea not cholesterol     Impaired glucose tolerance      Lichen simplex chronicus      Metabolic syndrome      Mild ascending aorta dilation (H)     40mm     Osteomyelitis of right leg (H) 1979     Restrictive lung disease     Dr No, mild restriction PFT     Sleep apnea     cpap     Venous disease     lower extremity       PAST SURGICAL HISTORY:  Past Surgical History:   Procedure Laterality Date     CARPAL TUNNEL RELEASE RT/LT Bilateral      CHOLECYSTECTOMY       CV RIGHT HEART CATH MEASUREMENTS RECORDED Right 7/9/2020    Procedure: Right Heart Cath WITH FULL OXIMETRY;  Surgeon: Boaz Bustillo MD;  Location:  HEART CARDIAC CATH LAB     CV RIGHT HEART CATH MEASUREMENTS RECORDED N/A 7/29/2021    Procedure: Right Heart Cath rest and exercise with elana in [place];  Surgeon: Dago Mckeon MD;  Location:  HEART CARDIAC CATH LAB     CV RIGHT HEART  EXERCISE STRESS STUDY N/A 7/29/2021    Procedure: Right Heart Exercise Stress Study;  Surgeon: Dago Mckeon MD;  Location:  HEART CARDIAC CATH LAB     KNEE SURGERY      arthroscopic (pt doesn't recall which knee)     NISSEN FUNDOPLICATION       ROTATOR CUFF REPAIR RT/LT Right        FAMILY HISTORY:  Family History   Problem Relation Age of Onset     Other Cancer Mother      Hypertension Mother      Colon Cancer Father      Asthma Father        SOCIAL HISTORY:  Social History     Socioeconomic History     Marital status:      Spouse name: None     Number of children: None     Years of education: None     Highest education level: None   Occupational History     Occupation:      Comment: service food machines   Tobacco Use     Smoking status: Never Smoker     Smokeless tobacco: Never Used   Substance and Sexual Activity     Alcohol use: Yes     Alcohol/week: 0.0 standard drinks     Comment: occ     Drug use: None     Sexual activity: None   Other Topics Concern     Parent/sibling w/ CABG, MI or angioplasty before 65F 55M? Not Asked      Service Not Asked     Blood Transfusions Not Asked     Caffeine Concern No     Comment: occ     Occupational Exposure Not Asked     Hobby Hazards Not Asked     Sleep Concern Not Asked     Stress Concern Not Asked     Weight Concern Not Asked     Special Diet No     Back Care Not Asked     Exercise No     Bike Helmet Not Asked     Seat Belt Not Asked     Self-Exams Not Asked   Social History Narrative     None     Social Determinants of Health     Financial Resource Strain:      Difficulty of Paying Living Expenses:    Food Insecurity:      Worried About Running Out of Food in the Last Year:      Ran Out of Food in the Last Year:    Transportation Needs:      Lack of Transportation (Medical):      Lack of Transportation (Non-Medical):    Physical Activity:      Days of Exercise per Week:      Minutes of Exercise per Session:    Stress:       "Feeling of Stress :    Social Connections:      Frequency of Communication with Friends and Family:      Frequency of Social Gatherings with Friends and Family:      Attends [employer] Services:      Active Member of Clubs or Organizations:      Attends Club or Organization Meetings:      Marital Status:    Intimate Partner Violence:      Fear of Current or Ex-Partner:      Emotionally Abused:      Physically Abused:      Sexually Abused:        Review of Systems:  Skin:  Negative     Eyes:  Positive for glasses  ENT:  Positive for sinus trouble  Respiratory:  Positive for sleep apnea;CPAP;dyspnea on exertion;shortness of breath;cough  Cardiovascular:    edema;Positive for;lightheadedness;dizziness;fatigue;chest pain  Gastroenterology: Positive for heartburn  Genitourinary:  Positive for prostate problem  Musculoskeletal:  Positive for arthritis;neck pain;back pain  Neurologic:  Positive for numbness or tingling of feet  Psychiatric:  Positive for depression;anxiety  Heme/Lymph/Imm:  Positive for allergies  Endocrine:  Negative      Physical Exam:  Vitals: Ht 1.753 m (5' 9\")   Wt 131 kg (288 lb 11.2 oz)   SpO2 94%   BMI 42.63 kg/m      Constitutional:  cooperative, alert and oriented, well developed, well nourished, in no acute distress        Skin:  warm and dry to the touch, no apparent skin lesions or masses noted          Head:  normocephalic, no masses or lesions        Eyes:           Lymph:No Cervical lymphadenopathy present;No thyromegaly     ENT:           Neck:  carotid pulses are full and equal bilaterally, JVP normal, no carotid bruit        Respiratory:  normal breath sounds, clear to auscultation, normal A-P diameter, normal symmetry, normal respiratory excursion, no use of accessory muscles         Cardiac: regular rhythm, normal S1/S2, no S3 or S4, apical impulse not displaced, no murmurs, gallops or rubs                                                         GI:  BS normoactive obese  "     Extremities and Muscular Skeletal:      bilateral LE edema;1+     chronic hemosiderin deposits    Neurological:  no gross motor deficits        Psych:  Alert and Oriented x 3      Recent Lab Results:  LIPID RESULTS:  Lab Results   Component Value Date    CHOL 106 06/21/2021    HDL 35 (L) 06/21/2021    LDL 38 06/21/2021    TRIG 164 (H) 06/21/2021       LIVER ENZYME RESULTS:  Lab Results   Component Value Date    ALT 34 06/21/2021       CBC RESULTS:  Lab Results   Component Value Date    WBC 8.4 07/29/2021    WBC 6.9 07/09/2020    RBC 4.56 07/29/2021    RBC 4.58 07/09/2020    HGB 14.3 07/29/2021    HGB 14.2 07/09/2020    HCT 42.0 07/29/2021    HCT 42.9 07/09/2020    MCV 92 07/29/2021    MCV 94 07/09/2020    MCH 31.4 07/29/2021    MCH 31.0 07/09/2020    MCHC 34.0 07/29/2021    MCHC 33.1 07/09/2020    RDW 13.7 07/29/2021    RDW 13.7 07/09/2020     07/29/2021     (L) 07/09/2020       BMP RESULTS:  Lab Results   Component Value Date     10/05/2021     06/21/2021    POTASSIUM 4.0 10/05/2021    POTASSIUM 4.2 06/21/2021    CHLORIDE 107 10/05/2021    CHLORIDE 107 06/21/2021    CO2 30 10/05/2021    CO2 24 06/21/2021    ANIONGAP 4 10/05/2021    ANIONGAP 5 06/21/2021     (H) 10/05/2021     (H) 06/21/2021    BUN 32 (H) 10/05/2021    BUN 32 (H) 06/21/2021    CR 1.41 (H) 10/05/2021    CR 1.30 (H) 06/21/2021    GFRESTIMATED 52 (L) 10/05/2021    GFRESTIMATED 57 (L) 06/21/2021    GFRESTBLACK 66 06/21/2021    MARGA 9.0 10/05/2021    MARGA 8.8 06/21/2021        A1C RESULTS:  Lab Results   Component Value Date    A1C 5.6 01/26/2016       INR RESULTS:  Lab Results   Component Value Date    INR 1.05 07/29/2021    INR 1.09 07/09/2020    INR 1.09 02/01/2016           CC  Anni Mendoza, APRN CNP  [address]  [place] [address]      Thank you for allowing me to participate in the care of your patient.      Sincerely,     Dago cMkeon MD     [facility]  [facility]  cc:   CHRISTIANE Malin CNP  [address]  [place] [id number]         This patient has been assessed with a concern for Malnutrition and was treated during this hospitalization for the following Nutrition diagnosis/diagnoses:     -  10/01/2022: Severe protein-calorie malnutrition

## 2023-01-05 NOTE — PROGRESS NOTES
Care Suites Post Procedure Note    Patient Information  Name: iAden Almaraz  Age: 64 year old    Post Procedure  Time patient returned to Care Suites: 1110  Concerns/abnormal assessment: none at this time  If abnormal assessment, provider notified: N/A  Plan/Other: post care as ordered discharge to home    Naseem Sesay RN      none

## 2023-01-23 ENCOUNTER — OFFICE VISIT (OUTPATIENT)
Dept: CARDIOLOGY | Facility: CLINIC | Age: 68
End: 2023-01-23
Attending: NURSE PRACTITIONER
Payer: COMMERCIAL

## 2023-01-23 VITALS
HEART RATE: 73 BPM | BODY MASS INDEX: 38.06 KG/M2 | DIASTOLIC BLOOD PRESSURE: 78 MMHG | OXYGEN SATURATION: 96 % | WEIGHT: 257 LBS | HEIGHT: 69 IN | SYSTOLIC BLOOD PRESSURE: 116 MMHG

## 2023-01-23 DIAGNOSIS — I50.30 HEART FAILURE WITH PRESERVED EJECTION FRACTION, NYHA CLASS I (H): ICD-10-CM

## 2023-01-23 DIAGNOSIS — I25.10 CORONARY ARTERY DISEASE INVOLVING NATIVE CORONARY ARTERY OF NATIVE HEART WITHOUT ANGINA PECTORIS: Primary | ICD-10-CM

## 2023-01-23 DIAGNOSIS — R06.02 SHORTNESS OF BREATH: ICD-10-CM

## 2023-01-23 PROCEDURE — 99214 OFFICE O/P EST MOD 30 MIN: CPT | Performed by: NURSE PRACTITIONER

## 2023-01-23 RX ORDER — BUPROPION HYDROCHLORIDE 150 MG/1
150 TABLET ORAL
COMMUNITY
Start: 2022-12-05

## 2023-01-23 RX ORDER — CYCLOBENZAPRINE HCL 5 MG
5 TABLET ORAL DAILY PRN
COMMUNITY
Start: 2022-12-27

## 2023-01-23 RX ORDER — IPRATROPIUM BROMIDE 42 UG/1
2 SPRAY, METERED NASAL 2 TIMES DAILY
COMMUNITY
Start: 2022-10-18

## 2023-01-23 RX ORDER — GABAPENTIN 100 MG/1
100 CAPSULE ORAL 3 TIMES DAILY
COMMUNITY
Start: 2022-12-27

## 2023-01-23 NOTE — PATIENT INSTRUCTIONS
Today's Recommendations    Continue weighing yourself daily and notify us if your weight increases by 3 lbs overnight or 5 lbs in a week  Keep working on weight loss   Before your next visit, I would like you to have your lipid panel and electrolytes rechecked  Continue all medications without changes.  Please follow up with Dr. Mckeon in 4 months.    Please send a [employer] message or call [phone] to the RN team with questions or concerns.     Scheduling number [phone]  CHRISTIANE Poe, CNP

## 2023-01-23 NOTE — PROGRESS NOTES
Cardiology Clinic Progress Note  Aiden Almaraz MRN# [id number]   YOB: 1955 Age: 67 year old   Primary Cardiologist: Dr. Mckeon Reason for visit: 3 month follow up             Assessment and Plan:       1.  HFpEF, LVEF 55-60% (TTE 10/2022)  -Cotninue farxiga, spironolactone and torsemide  -No s/sx of decompensated heart failure today    2.  Nonobstructive CAD  -Continue aspirin and statin  -Chest tightness has been present for several years, prior to multiple ischemic evaluations, and does not sound anginal in nature.     3.  Obesity, BMI 37.95  -Patient to resume noom healthy eating plan    4.  Obstructive sleep apnea, compliant with CPAP    5. Bladder CA, follows at [facility] for oncology  -Plan is currently for cystoscopy every 3 month and instilled chemotherapy every 6 months weekly for 3 weeks      Changes today: No medication changes were made today    Follow up plan: Repeat BMP and FLP/ALT in 4 months prior to visit with Dr. Mckeon        History of Presenting Illness:    Aiden Almaraz is a very pleasant 67 year old male with a history of HFpEF, chronic exertional dyspnea, nonobstructive coronary artery disease, obstructive sleep apnea, obesity, chronic dizziness/headaches since concussion in 2017, high-grade bladder cancer currently undergoing treatment at [facility].    Patient underwent right heart catheterization in 7/2021 as part of a work-up for exertional dyspnea which showed mildly elevated right and left-sided filling pressures.  Exertional dyspnea is felt to be multifactorial.    In July 2022 patient was seen in follow-up after initiation of Farxiga, reduction of torsemide, reduction in spironolactone and stopping lisinopril.  Unfortunately he developed worsening leg edema and spironolactone was increased 25 mg daily.    He was seen in follow-up by Amaya Desai NP October 2022, at that time he was experiencing exertional dyspnea, headache and fatigue, which was worse  following stopping his cancer treatments.  He was planning to undergo further bladder surgery in November 2022.  His BNP at that time was normal as well as his sodium, and potassium.  His BUN was 19 and creatinine was 1.2 with a normal GFR 66.     Repeat TTE was completed 10/2022 which showed normal LVEF 55 to 60%, normal LV filling pressures and moderately decreased RV systolic function which is similar to previous study.    Patient is here today for a 3 month follow up.     Patient reports feeling good. His fatigue and mood have improved since cessation of his chemotherapy. Monitoring weights daily a home. His weight is down 8 pounds over the last 3 months.    Patient denies chest pain.  Denies lightheadedness or other presyncopal symptoms. Denies tachycardia or palpitations.  Denies abdominal distension,orthopnea or PND.  His edema is stable. He feels his shortness of breath is unchanged over the last several months.     Working with [facility] brain rehab for continued issues with processing and dizziness after his concussion in 2017.     He will have sporadic substernal chest tightness at rest which lasts approximately 1 minute.  Occurs a few times per year.  No symptoms with exertion.  This has been present for several years and has no associated symptoms when it occurs.  Notes this was occurring prior to his MRI done in 2021 which was stable when compared to 2020 and had no regional wall motion abnormalities.    He was previously following noom diet plan, approximately 1800 kcals per day however stopped during his bladder cancer treatment and has yet to resume this since the new year, but plans to do so.  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    Labs done 10/13/22 sodium 139, potassium 3.9, creatinine 1.2, GFR 66. Blood pressure 116/78 and HR 73 in clinic today.          Recent Hospitalizations   None in [id number]        Social History       Social History     Socioeconomic  "History     Marital status:      Spouse name: Not on file     Number of children: Not on file     Years of education: Not on file     Highest education level: Not on file   Occupational History     Occupation:      Comment: service food machines   Tobacco Use     Smoking status: Never     Smokeless tobacco: Never   Substance and Sexual Activity     Alcohol use: Yes     Alcohol/week: 0.0 standard drinks     Comment: occ     Drug use: Not on file     Sexual activity: Not on file   Other Topics Concern     Parent/sibling w/ CABG, MI or angioplasty before 65F 55M? Not Asked      Service Not Asked     Blood Transfusions Not Asked     Caffeine Concern No     Comment: occ     Occupational Exposure Not Asked     Hobby Hazards Not Asked     Sleep Concern Not Asked     Stress Concern Not Asked     Weight Concern Not Asked     Special Diet No     Back Care Not Asked     Exercise No     Bike Helmet Not Asked     Seat Belt Not Asked     Self-Exams Not Asked   Social History Narrative     Not on file     Social Determinants of Health     Financial Resource Strain: Not on file   Food Insecurity: Not on file   Transportation Needs: Not on file   Physical Activity: Not on file   Stress: Not on file   Social Connections: Not on file   Intimate Partner Violence: Not on file   Housing Stability: Not on file            Review of Systems:   Skin:        Eyes:       ENT:       Respiratory:  Positive for dyspnea on exertion;sleep apnea;CPAP  Cardiovascular:    Positive for;fatigue;dizziness  Gastroenterology:      Genitourinary:  Positive for    Musculoskeletal:       Neurologic:  Positive for headaches  Psychiatric:       Heme/Lymph/Imm:       Endocrine:              Physical Exam:   Vitals: /78 (BP Location: Right arm, Patient Position: Sitting, Cuff Size: Adult Regular)   Pulse 73   Ht 1.753 m (5' 9\")   Wt 116.6 kg (257 lb)   SpO2 96%   BMI 37.95 kg/m     Wt Readings from Last 4 Encounters: "   01/23/23 116.6 kg (257 lb)   10/13/22 120.4 kg (265 lb 6.4 oz)   07/22/22 119.2 kg (262 lb 11.2 oz)   06/29/22 119.9 kg (264 lb 4.8 oz)     GEN: well nourished, in no acute distress.  HEENT:  Pupils equal, round. Sclerae nonicteric.   NECK: Supple, no masses appreciated. No JVD with patient reclined.  C/V:  Regular rate and rhythm, no murmur, rub or gallop.    RESP: Respirations are unlabored. Clear to auscultation bilaterally without wheezing, rales, or rhonchi.  GI: Abdomen soft, nontender.  EXTREM: Trace bilateral LE edema to mid-calf.  NEURO: Alert and oriented, cooperative.  SKIN: Warm and dry.        Data:     LIPID RESULTS:  Lab Results   Component Value Date    CHOL 106 06/21/2021    HDL 35 (L) 06/21/2021    LDL 38 06/21/2021    TRIG 164 (H) 06/21/2021     LIVER ENZYME RESULTS:  Lab Results   Component Value Date    ALT 34 06/21/2021     CBC RESULTS:  Lab Results   Component Value Date    WBC 8.4 07/29/2021    WBC 6.9 07/09/2020    RBC 4.56 07/29/2021    RBC 4.58 07/09/2020    HGB 14.3 07/29/2021    HGB 14.2 07/09/2020    HCT 42.0 07/29/2021    HCT 42.9 07/09/2020    MCV 92 07/29/2021    MCV 94 07/09/2020    MCH 31.4 07/29/2021    MCH 31.0 07/09/2020    MCHC 34.0 07/29/2021    MCHC 33.1 07/09/2020    RDW 13.7 07/29/2021    RDW 13.7 07/09/2020     07/29/2021     (L) 07/09/2020     BMP RESULTS:  Lab Results   Component Value Date     10/13/2022     06/21/2021    POTASSIUM 3.9 10/13/2022    POTASSIUM 3.4 07/22/2022    POTASSIUM 4.2 06/21/2021    CHLORIDE 103 10/13/2022    CHLORIDE 106 07/22/2022    CHLORIDE 107 06/21/2021    CO2 25 10/13/2022    CO2 29 07/22/2022    CO2 24 06/21/2021    ANIONGAP 11 10/13/2022    ANIONGAP 5 07/22/2022    ANIONGAP 5 06/21/2021     (H) 10/13/2022     (H) 07/22/2022     (H) 06/21/2021    BUN 19.0 10/13/2022    BUN 27 07/22/2022    BUN 32 (H) 06/21/2021    CR 1.20 (H) 10/13/2022    CR 1.30 (H) 06/21/2021    GFRESTIMATED 66 10/13/2022     GFRESTIMATED 57 (L) 06/21/2021    GFRESTBLACK 66 06/21/2021    MARGA 9.1 10/13/2022    MARGA 8.8 06/21/2021      A1C RESULTS:  Lab Results   Component Value Date    A1C 5.6 06/29/2022    A1C 5.6 01/26/2016     INR RESULTS:  Lab Results   Component Value Date    INR 1.05 07/29/2021    INR 1.09 07/09/2020    INR 1.09 02/01/2016            Medications     Current Outpatient Medications   Medication Sig Dispense Refill     acetaminophen (TYLENOL) 500 MG tablet Take 500-1,000 mg by mouth every 6 hours as needed for mild pain       aspirin 81 MG tablet Take 1 tablet (81 mg) by mouth daily 30 tablet      atorvastatin (LIPITOR) 20 MG tablet Take 1 tablet (20 mg) by mouth daily 90 tablet 3     buPROPion (WELLBUTRIN XL) 150 MG 24 hr tablet Take 150 mg by mouth       cholecalciferol 50 MCG (2000 UT) tablet Take 2,000 Units by mouth daily       cyclobenzaprine (FLEXERIL) 5 MG tablet Take 5 mg by mouth daily as needed       dapagliflozin (FARXIGA) 10 MG TABS tablet Take 1 tablet (10 mg) by mouth daily 90 tablet 3     FLUoxetine (PROZAC) 20 MG capsule Take 40 mg by mouth daily       gabapentin (NEURONTIN) 100 MG capsule Take 100 mg by mouth 3 times daily       ipratropium (ATROVENT) 0.06 % nasal spray Spray 2 sprays into both nostrils 2 times daily       meclizine 25 MG CHEW Take 25 mg by mouth every 6 hours as needed for dizziness       Respiratory Therapy Supplies (CARETOUCH 2 CPAP HOSE ) MISC CPAP machine for home use at pressure 15 cmw, full face mask x1/3month with a full face cushion x1/mo       spironolactone (ALDACTONE) 25 MG tablet Take 1 tablet (25 mg) by mouth daily 90 tablet 3     torsemide (DEMADEX) 10 MG tablet Take 1 tablet (10 mg) by mouth daily 1 tab daily 90 tablet 3     mirabegron (MYRBETRIQ) 50 MG 24 hr tablet Take 1 tablet by mouth daily (Patient not taking: Reported on 1/23/2023)            Past Medical History     Past Medical History:   Diagnosis Date     Corey's esophagus      Bladder cancer (H)  2022    high grade, followed at [facility]     CAD (coronary artery disease) 02/2016    mild, nonobstructive per cath 2016     Concussion 2016    fall went to ER--now with dizzy and memory issue     Degenerative disc disease, lumbar      Depression with anxiety      Diastolic congestive heart failure (H)     non compliant LV, HFpEF     Diastolic heart failure (H)      Gastroesophageal reflux disease 1999    lap nissen at [facility] june 1999     HTN (hypertension)      Hyperlipidemia      IBS (irritable bowel syndrome)     on colestid for diarrhea not cholesterol     Impaired glucose tolerance      Lichen simplex chronicus      Metabolic syndrome      Mild ascending aorta dilation (H)     40mm     Osteomyelitis of right leg (H) 1979     Restrictive lung disease     Dr No, mild restriction PFT     Sleep apnea     cpap     Venous disease     lower extremity     Past Surgical History:   Procedure Laterality Date     CARPAL TUNNEL RELEASE RT/LT Bilateral      CHOLECYSTECTOMY       CV RIGHT HEART CATH MEASUREMENTS RECORDED Right 7/9/2020    Procedure: Right Heart Cath WITH FULL OXIMETRY;  Surgeon: Boaz Bustillo MD;  Location:  HEART CARDIAC CATH LAB     CV RIGHT HEART CATH MEASUREMENTS RECORDED N/A 7/29/2021    Procedure: Right Heart Cath rest and exercise with elana in [place];  Surgeon: Dago Mckeon MD;  Location:  HEART CARDIAC CATH LAB     CV RIGHT HEART EXERCISE STRESS STUDY N/A 7/29/2021    Procedure: Right Heart Exercise Stress Study;  Surgeon: Dago Mckeon MD;  Location:  HEART CARDIAC CATH LAB     KNEE SURGERY      arthroscopic (pt doesn't recall which knee)     NISSEN FUNDOPLICATION       ROTATOR CUFF REPAIR RT/LT Right      Family History   Problem Relation Age of Onset     Other Cancer Mother      Hypertension Mother      Colon Cancer Father      Asthma Father             Allergies   Hmg-coa-r inhibitors and Seasonal allergies        Nellie Singh NP  [facility]  [facility]  Pager: [phone]

## 2023-01-23 NOTE — LETTER
1/23/2023    Calvin W Giselle  Formerly [facility] [address] [facility] [address] [address]    RE: Aiden Almaraz       Dear Colleague,     I had the pleasure of seeing Aiden Almaraz in the [facility].  Cardiology Clinic Progress Note  Aiden Almaraz MRN# [id number]   YOB: 1955 Age: 67 year old   Primary Cardiologist: Dr. Mckeon Reason for visit: 3 month follow up             Assessment and Plan:       1.  HFpEF, LVEF 55-60% (TTE 10/2022)  -Cotninue farxiga, spironolactone and torsemide  -No s/sx of decompensated heart failure today    2.  Nonobstructive CAD  -Continue aspirin and statin  -Chest tightness has been present for several years, prior to multiple ischemic evaluations, and does not sound anginal in nature.     3.  Obesity, BMI 37.95  -Patient to resume noom healthy eating plan    4.  Obstructive sleep apnea, compliant with CPAP    5. Bladder CA, follows at [facility] for oncology  -Plan is currently for cystoscopy every 3 month and instilled chemotherapy every 6 months weekly for 3 weeks      Changes today: No medication changes were made today    Follow up plan: Repeat BMP and FLP/ALT in 4 months prior to visit with Dr. Mckeon        History of Presenting Illness:    Aiden Almaraz is a very pleasant 67 year old male with a history of HFpEF, chronic exertional dyspnea, nonobstructive coronary artery disease, obstructive sleep apnea, obesity, chronic dizziness/headaches since concussion in 2017, high-grade bladder cancer currently undergoing treatment at [facility].    Patient underwent right heart catheterization in 7/2021 as part of a work-up for exertional dyspnea which showed mildly elevated right and left-sided filling pressures.  Exertional dyspnea is felt to be multifactorial.    In July 2022 patient was seen in follow-up after initiation of Farxiga, reduction of torsemide, reduction in spironolactone and stopping lisinopril.  Unfortunately he developed  worsening leg edema and spironolactone was increased 25 mg daily.    He was seen in follow-up by Amaya Desai NP October 2022, at that time he was experiencing exertional dyspnea, headache and fatigue, which was worse following stopping his cancer treatments.  He was planning to undergo further bladder surgery in November 2022.  His BNP at that time was normal as well as his sodium, and potassium.  His BUN was 19 and creatinine was 1.2 with a normal GFR 66.     Repeat TTE was completed 10/2022 which showed normal LVEF 55 to 60%, normal LV filling pressures and moderately decreased RV systolic function which is similar to previous study.    Patient is here today for a 3 month follow up.     Patient reports feeling good. His fatigue and mood have improved since cessation of his chemotherapy. Monitoring weights daily a home. His weight is down 8 pounds over the last 3 months.    Patient denies chest pain.  Denies lightheadedness or other presyncopal symptoms. Denies tachycardia or palpitations.  Denies abdominal distension,orthopnea or PND.  His edema is stable. He feels his shortness of breath is unchanged over the last several months.     Working with [facility] brain rehab for continued issues with processing and dizziness after his concussion in 2017.     He will have sporadic substernal chest tightness at rest which lasts approximately 1 minute.  Occurs a few times per year.  No symptoms with exertion.  This has been present for several years and has no associated symptoms when it occurs.  Notes this was occurring prior to his MRI done in 2021 which was stable when compared to 2020 and had no regional wall motion abnormalities.    He was previously following noom diet plan, approximately 1800 kcals per day however stopped during his bladder cancer treatment and has yet to resume this since the new year, but plans to do so.  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    Labs done 10/13/22  sodium 139, potassium 3.9, creatinine 1.2, GFR 66. Blood pressure 116/78 and HR 73 in clinic today.          Recent Hospitalizations   None in [id number]        Social History       Social History     Socioeconomic History     Marital status:      Spouse name: Not on file     Number of children: Not on file     Years of education: Not on file     Highest education level: Not on file   Occupational History     Occupation:      Comment: service food machines   Tobacco Use     Smoking status: Never     Smokeless tobacco: Never   Substance and Sexual Activity     Alcohol use: Yes     Alcohol/week: 0.0 standard drinks     Comment: occ     Drug use: Not on file     Sexual activity: Not on file   Other Topics Concern     Parent/sibling w/ CABG, MI or angioplasty before 65F 55M? Not Asked      Service Not Asked     Blood Transfusions Not Asked     Caffeine Concern No     Comment: occ     Occupational Exposure Not Asked     Hobby Hazards Not Asked     Sleep Concern Not Asked     Stress Concern Not Asked     Weight Concern Not Asked     Special Diet No     Back Care Not Asked     Exercise No     Bike Helmet Not Asked     Seat Belt Not Asked     Self-Exams Not Asked   Social History Narrative     Not on file     Social Determinants of Health     Financial Resource Strain: Not on file   Food Insecurity: Not on file   Transportation Needs: Not on file   Physical Activity: Not on file   Stress: Not on file   Social Connections: Not on file   Intimate Partner Violence: Not on file   Housing Stability: Not on file            Review of Systems:   Skin:        Eyes:       ENT:       Respiratory:  Positive for dyspnea on exertion;sleep apnea;CPAP  Cardiovascular:    Positive for;fatigue;dizziness  Gastroenterology:      Genitourinary:  Positive for    Musculoskeletal:       Neurologic:  Positive for headaches  Psychiatric:       Heme/Lymph/Imm:       Endocrine:              Physical Exam:  "  Vitals: /78 (BP Location: Right arm, Patient Position: Sitting, Cuff Size: Adult Regular)   Pulse 73   Ht 1.753 m (5' 9\")   Wt 116.6 kg (257 lb)   SpO2 96%   BMI 37.95 kg/m     Wt Readings from Last 4 Encounters:   01/23/23 116.6 kg (257 lb)   10/13/22 120.4 kg (265 lb 6.4 oz)   07/22/22 119.2 kg (262 lb 11.2 oz)   06/29/22 119.9 kg (264 lb 4.8 oz)     GEN: well nourished, in no acute distress.  HEENT:  Pupils equal, round. Sclerae nonicteric.   NECK: Supple, no masses appreciated. No JVD with patient reclined.  C/V:  Regular rate and rhythm, no murmur, rub or gallop.    RESP: Respirations are unlabored. Clear to auscultation bilaterally without wheezing, rales, or rhonchi.  GI: Abdomen soft, nontender.  EXTREM: Trace bilateral LE edema to mid-calf.  NEURO: Alert and oriented, cooperative.  SKIN: Warm and dry.        Data:     LIPID RESULTS:  Lab Results   Component Value Date    CHOL 106 06/21/2021    HDL 35 (L) 06/21/2021    LDL 38 06/21/2021    TRIG 164 (H) 06/21/2021     LIVER ENZYME RESULTS:  Lab Results   Component Value Date    ALT 34 06/21/2021     CBC RESULTS:  Lab Results   Component Value Date    WBC 8.4 07/29/2021    WBC 6.9 07/09/2020    RBC 4.56 07/29/2021    RBC 4.58 07/09/2020    HGB 14.3 07/29/2021    HGB 14.2 07/09/2020    HCT 42.0 07/29/2021    HCT 42.9 07/09/2020    MCV 92 07/29/2021    MCV 94 07/09/2020    MCH 31.4 07/29/2021    MCH 31.0 07/09/2020    MCHC 34.0 07/29/2021    MCHC 33.1 07/09/2020    RDW 13.7 07/29/2021    RDW 13.7 07/09/2020     07/29/2021     (L) 07/09/2020     BMP RESULTS:  Lab Results   Component Value Date     10/13/2022     06/21/2021    POTASSIUM 3.9 10/13/2022    POTASSIUM 3.4 07/22/2022    POTASSIUM 4.2 06/21/2021    CHLORIDE 103 10/13/2022    CHLORIDE 106 07/22/2022    CHLORIDE 107 06/21/2021    CO2 25 10/13/2022    CO2 29 07/22/2022    CO2 24 06/21/2021    ANIONGAP 11 10/13/2022    ANIONGAP 5 07/22/2022    ANIONGAP 5 06/21/2021    "  (H) 10/13/2022     (H) 07/22/2022     (H) 06/21/2021    BUN 19.0 10/13/2022    BUN 27 07/22/2022    BUN 32 (H) 06/21/2021    CR 1.20 (H) 10/13/2022    CR 1.30 (H) 06/21/2021    GFRESTIMATED 66 10/13/2022    GFRESTIMATED 57 (L) 06/21/2021    GFRESTBLACK 66 06/21/2021    MARGA 9.1 10/13/2022    MARGA 8.8 06/21/2021      A1C RESULTS:  Lab Results   Component Value Date    A1C 5.6 06/29/2022    A1C 5.6 01/26/2016     INR RESULTS:  Lab Results   Component Value Date    INR 1.05 07/29/2021    INR 1.09 07/09/2020    INR 1.09 02/01/2016            Medications     Current Outpatient Medications   Medication Sig Dispense Refill     acetaminophen (TYLENOL) 500 MG tablet Take 500-1,000 mg by mouth every 6 hours as needed for mild pain       aspirin 81 MG tablet Take 1 tablet (81 mg) by mouth daily 30 tablet      atorvastatin (LIPITOR) 20 MG tablet Take 1 tablet (20 mg) by mouth daily 90 tablet 3     buPROPion (WELLBUTRIN XL) 150 MG 24 hr tablet Take 150 mg by mouth       cholecalciferol 50 MCG (2000 UT) tablet Take 2,000 Units by mouth daily       cyclobenzaprine (FLEXERIL) 5 MG tablet Take 5 mg by mouth daily as needed       dapagliflozin (FARXIGA) 10 MG TABS tablet Take 1 tablet (10 mg) by mouth daily 90 tablet 3     FLUoxetine (PROZAC) 20 MG capsule Take 40 mg by mouth daily       gabapentin (NEURONTIN) 100 MG capsule Take 100 mg by mouth 3 times daily       ipratropium (ATROVENT) 0.06 % nasal spray Spray 2 sprays into both nostrils 2 times daily       meclizine 25 MG CHEW Take 25 mg by mouth every 6 hours as needed for dizziness       Respiratory Therapy Supplies (CARETOUCH 2 CPAP HOSE ) MISC CPAP machine for home use at pressure 15 cmw, full face mask x1/3month with a full face cushion x1/mo       spironolactone (ALDACTONE) 25 MG tablet Take 1 tablet (25 mg) by mouth daily 90 tablet 3     torsemide (DEMADEX) 10 MG tablet Take 1 tablet (10 mg) by mouth daily 1 tab daily 90 tablet 3     mirabegron  (MYRBETRIQ) 50 MG 24 hr tablet Take 1 tablet by mouth daily (Patient not taking: Reported on 1/23/2023)            Past Medical History     Past Medical History:   Diagnosis Date     Corey's esophagus      Bladder cancer (H) 2022    high grade, followed at [facility]     CAD (coronary artery disease) 02/2016    mild, nonobstructive per cath 2016     Concussion 2016    fall went to ER--now with dizzy and memory issue     Degenerative disc disease, lumbar      Depression with anxiety      Diastolic congestive heart failure (H)     non compliant LV, HFpEF     Diastolic heart failure (H)      Gastroesophageal reflux disease 1999    lap nissen at [facility] june 1999     HTN (hypertension)      Hyperlipidemia      IBS (irritable bowel syndrome)     on colestid for diarrhea not cholesterol     Impaired glucose tolerance      Lichen simplex chronicus      Metabolic syndrome      Mild ascending aorta dilation (H)     40mm     Osteomyelitis of right leg (H) 1979     Restrictive lung disease     Dr No, mild restriction PFT     Sleep apnea     cpap     Venous disease     lower extremity     Past Surgical History:   Procedure Laterality Date     CARPAL TUNNEL RELEASE RT/LT Bilateral      CHOLECYSTECTOMY       CV RIGHT HEART CATH MEASUREMENTS RECORDED Right 7/9/2020    Procedure: Right Heart Cath WITH FULL OXIMETRY;  Surgeon: Boaz Bustillo MD;  Location:  HEART CARDIAC CATH LAB     CV RIGHT HEART CATH MEASUREMENTS RECORDED N/A 7/29/2021    Procedure: Right Heart Cath rest and exercise with elana in [place];  Surgeon: Dago Mckeon MD;  Location:  HEART CARDIAC CATH LAB     CV RIGHT HEART EXERCISE STRESS STUDY N/A 7/29/2021    Procedure: Right Heart Exercise Stress Study;  Surgeon: Dago Mckeon MD;  Location:  HEART CARDIAC CATH LAB     KNEE SURGERY      arthroscopic (pt doesn't recall which knee)     NISSEN FUNDOPLICATION       ROTATOR CUFF REPAIR RT/LT Right      Family History   Problem  Relation Age of Onset     Other Cancer Mother      Hypertension Mother      Colon Cancer Father      Asthma Father             Allergies   Hmg-coa-r inhibitors and Seasonal allergies    Nellie Singh NP  [facility]  Pager: [phone]    Thank you for allowing me to participate in the care of your patient.      Sincerely,     Nellie Singh NP     [facility]  cc:   Amaya Fisher, CHRISTIANE CNP  [address] [place] [address]

## 2023-06-01 ENCOUNTER — HEALTH MAINTENANCE LETTER (OUTPATIENT)
Age: 68
End: 2023-06-01

## 2023-06-05 ENCOUNTER — LAB (OUTPATIENT)
Dept: LAB | Facility: CLINIC | Age: 68
End: 2023-06-05
Payer: COMMERCIAL

## 2023-06-05 DIAGNOSIS — I50.30 HEART FAILURE WITH PRESERVED EJECTION FRACTION, NYHA CLASS I (H): ICD-10-CM

## 2023-06-05 DIAGNOSIS — I25.10 CORONARY ARTERY DISEASE INVOLVING NATIVE CORONARY ARTERY OF NATIVE HEART WITHOUT ANGINA PECTORIS: ICD-10-CM

## 2023-06-05 LAB
ALT SERPL W P-5'-P-CCNC: 22 U/L (ref 10–50)
ANION GAP SERPL CALCULATED.3IONS-SCNC: 14 MMOL/L (ref 7–15)
BUN SERPL-MCNC: 26.2 MG/DL (ref 8–23)
CALCIUM SERPL-MCNC: 9.3 MG/DL (ref 8.8–10.2)
CHLORIDE SERPL-SCNC: 104 MMOL/L (ref 98–107)
CHOLEST SERPL-MCNC: 121 MG/DL
CREAT SERPL-MCNC: 1.27 MG/DL (ref 0.67–1.17)
DEPRECATED HCO3 PLAS-SCNC: 20 MMOL/L (ref 22–29)
GFR SERPL CREATININE-BSD FRML MDRD: 62 ML/MIN/1.73M2
GLUCOSE SERPL-MCNC: 118 MG/DL (ref 70–99)
HDLC SERPL-MCNC: 31 MG/DL
LDLC SERPL CALC-MCNC: 32 MG/DL
NONHDLC SERPL-MCNC: 90 MG/DL
POTASSIUM SERPL-SCNC: 3.9 MMOL/L (ref 3.4–5.3)
SODIUM SERPL-SCNC: 138 MMOL/L (ref 136–145)
TRIGL SERPL-MCNC: 292 MG/DL

## 2023-06-05 PROCEDURE — 80061 LIPID PANEL: CPT | Performed by: NURSE PRACTITIONER

## 2023-06-05 PROCEDURE — 80048 BASIC METABOLIC PNL TOTAL CA: CPT | Performed by: NURSE PRACTITIONER

## 2023-06-05 PROCEDURE — 36415 COLL VENOUS BLD VENIPUNCTURE: CPT | Performed by: NURSE PRACTITIONER

## 2023-06-05 PROCEDURE — 84460 ALANINE AMINO (ALT) (SGPT): CPT | Performed by: NURSE PRACTITIONER

## 2023-06-06 ENCOUNTER — OFFICE VISIT (OUTPATIENT)
Dept: CARDIOLOGY | Facility: CLINIC | Age: 68
End: 2023-06-06
Attending: NURSE PRACTITIONER
Payer: COMMERCIAL

## 2023-06-06 VITALS
HEIGHT: 69 IN | HEART RATE: 63 BPM | SYSTOLIC BLOOD PRESSURE: 115 MMHG | DIASTOLIC BLOOD PRESSURE: 72 MMHG | WEIGHT: 258.1 LBS | BODY MASS INDEX: 38.23 KG/M2 | OXYGEN SATURATION: 98 %

## 2023-06-06 DIAGNOSIS — I25.10 CORONARY ARTERY DISEASE INVOLVING NATIVE CORONARY ARTERY OF NATIVE HEART WITHOUT ANGINA PECTORIS: Primary | ICD-10-CM

## 2023-06-06 DIAGNOSIS — I50.30 HEART FAILURE WITH PRESERVED EJECTION FRACTION, NYHA CLASS I (H): ICD-10-CM

## 2023-06-06 PROCEDURE — 99214 OFFICE O/P EST MOD 30 MIN: CPT | Performed by: INTERNAL MEDICINE

## 2023-06-06 NOTE — LETTER
6/6/2023    Calvin Desai  [place] [id number] [facility] [address]    RE: Aiden Almaraz       Dear Colleague,     I had the pleasure of seeing Aiden Almaraz in the [facility].  HPI and Plan:   See dictation    Orders Placed This Encounter   Procedures    Basic metabolic panel    Lipid Profile    Follow-Up with Cardiology    Echocardiogram Complete     No orders of the defined types were placed in this encounter.    There are no discontinued medications.      Encounter Diagnoses   Name Primary?    Heart failure with preserved ejection fraction, NYHA class I (H)     Coronary artery disease involving native coronary artery of native heart without angina pectoris Yes       CURRENT MEDICATIONS:  Current Outpatient Medications   Medication Sig Dispense Refill    acetaminophen (TYLENOL) 500 MG tablet Take 500-1,000 mg by mouth every 6 hours as needed for mild pain      aspirin 81 MG tablet Take 1 tablet (81 mg) by mouth daily 30 tablet     atorvastatin (LIPITOR) 20 MG tablet Take 1 tablet (20 mg) by mouth daily 90 tablet 3    buPROPion (WELLBUTRIN XL) 150 MG 24 hr tablet Take 150 mg by mouth      cholecalciferol 50 MCG (2000 UT) tablet Take 2,000 Units by mouth daily      cyclobenzaprine (FLEXERIL) 5 MG tablet Take 5 mg by mouth daily as needed      dapagliflozin (FARXIGA) 10 MG TABS tablet Take 1 tablet (10 mg) by mouth daily 90 tablet 3    FLUoxetine (PROZAC) 20 MG capsule Take 40 mg by mouth daily      gabapentin (NEURONTIN) 100 MG capsule Take 100 mg by mouth 3 times daily      ipratropium (ATROVENT) 0.06 % nasal spray Spray 2 sprays into both nostrils 2 times daily      meclizine 25 MG CHEW Take 25 mg by mouth every 6 hours as needed for dizziness      Respiratory Therapy Supplies (CARETOUCH 2 CPAP HOSE ) MISC CPAP machine for home use at pressure 15 cmw, full face mask x1/3month with a full face cushion x1/mo      spironolactone (ALDACTONE) 25 MG tablet Take 1  tablet (25 mg) by mouth daily 90 tablet 3    torsemide (DEMADEX) 10 MG tablet Take 1 tablet (10 mg) by mouth daily 1 tab daily 90 tablet 3    mirabegron (MYRBETRIQ) 50 MG 24 hr tablet Take 1 tablet by mouth daily (Patient not taking: Reported on 1/23/2023)         ALLERGIES     Allergies   Allergen Reactions    Seasonal Allergies     Statins        PAST MEDICAL HISTORY:  Past Medical History:   Diagnosis Date    Corey's esophagus     Bladder cancer (H) 2022    high grade, followed at [facility]    CAD (coronary artery disease) 02/2016    mild, nonobstructive per cath 2016    Concussion 2016    fall went to ER--now with dizzy and memory issue    Degenerative disc disease, lumbar     Depression with anxiety     Diastolic congestive heart failure (H)     non compliant LV, HFpEF    Diastolic heart failure (H)     Gastroesophageal reflux disease 1999    lap nissen at [facility] june 1999    HTN (hypertension)     Hyperlipidemia     IBS (irritable bowel syndrome)     on colestid for diarrhea not cholesterol    Impaired glucose tolerance     Lichen simplex chronicus     Metabolic syndrome     Mild ascending aorta dilation (H)     40mm    Osteomyelitis of right leg (H) 1979    Restrictive lung disease     Dr No, mild restriction PFT    Sleep apnea     cpap    Venous disease     lower extremity       PAST SURGICAL HISTORY:  Past Surgical History:   Procedure Laterality Date    CARPAL TUNNEL RELEASE RT/LT Bilateral     CHOLECYSTECTOMY      CV RIGHT HEART CATH MEASUREMENTS RECORDED Right 7/9/2020    Procedure: Right Heart Cath WITH FULL OXIMETRY;  Surgeon: Boaz Bustillo MD;  Location:  HEART CARDIAC CATH LAB    CV RIGHT HEART CATH MEASUREMENTS RECORDED N/A 7/29/2021    Procedure: Right Heart Cath rest and exercise with elana in [place];  Surgeon: Dago Mckeon MD;  Location:  HEART CARDIAC CATH LAB    CV RIGHT HEART EXERCISE STRESS STUDY N/A 7/29/2021    Procedure: Right Heart Exercise Stress Study;   "Surgeon: Dago Mckeon MD;  Location:  HEART CARDIAC CATH LAB    KNEE SURGERY      arthroscopic (pt doesn't recall which knee)    NISSEN FUNDOPLICATION      ROTATOR CUFF REPAIR RT/LT Right        FAMILY HISTORY:  Family History   Problem Relation Age of Onset    Other Cancer Mother     Hypertension Mother     Colon Cancer Father     Asthma Father        SOCIAL HISTORY:  Social History     Socioeconomic History    Marital status:      Spouse name: None    Number of children: None    Years of education: None    Highest education level: None   Occupational History    Occupation:      Comment: service food machines   Tobacco Use    Smoking status: Never    Smokeless tobacco: Never   Substance and Sexual Activity    Alcohol use: Yes     Alcohol/week: 0.0 standard drinks of alcohol     Comment: occ   Other Topics Concern    Caffeine Concern No     Comment: occ    Special Diet No    Exercise No       Review of Systems:  Skin:  not assessed     Eyes:  Positive for glasses  ENT:  not assessed    Respiratory:  Positive for cough;shortness of breath;sleep apnea;CPAP  Cardiovascular:    lightheadedness;dizziness;Positive for  Gastroenterology: not assessed    Genitourinary:  not assessed    Musculoskeletal:  not assessed    Neurologic:  not assessed    Psychiatric:  not assessed    Heme/Lymph/Imm:  not assessed    Endocrine:  not assessed      Physical Exam:  Vitals: /72 (BP Location: Right arm, Patient Position: Sitting, Cuff Size: Adult Large)   Pulse 63   Ht 1.753 m (5' 9\")   Wt 117.1 kg (258 lb 1.6 oz)   SpO2 98%   BMI 38.11 kg/m      Constitutional:  cooperative, alert and oriented, well developed, well nourished, in no acute distress        Skin:  warm and dry to the touch, no apparent skin lesions or masses noted          Head:  normocephalic, no masses or lesions        Eyes:  pupils equal and round, conjunctivae and lids unremarkable, sclera white, no xanthalasma, EOMS intact, " no nystagmus        Lymph:No Cervical lymphadenopathy present;No thyromegaly     ENT:           Neck:  carotid pulses are full and equal bilaterally, JVP normal, no carotid bruit        Respiratory:  normal breath sounds, clear to auscultation, normal A-P diameter, normal symmetry, normal respiratory excursion, no use of accessory muscles         Cardiac: regular rhythm, normal S1/S2, no S3 or S4, apical impulse not displaced, no murmurs, gallops or rubs                pulses full and equal, no bruits auscultated                                        GI:  abdomen soft, non-tender, BS normoactive, no mass, no HSM, no bruits obese difficult exam due to pannus    Extremities and Muscular Skeletal:  no deformities, clubbing, cyanosis, erythema observed;no edema         hemosiderin deposits ankles but no fluid now    Neurological:  no gross motor deficits        Psych:  Alert and Oriented x 3      Recent Lab Results:  LIPID RESULTS:  Lab Results   Component Value Date    CHOL 121 06/05/2023    CHOL 106 06/21/2021    HDL 31 (L) 06/05/2023    HDL 35 (L) 06/21/2021    LDL 32 06/05/2023    LDL 38 06/21/2021    TRIG 292 (H) 06/05/2023    TRIG 164 (H) 06/21/2021       LIVER ENZYME RESULTS:  Lab Results   Component Value Date    ALT 22 06/05/2023    ALT 34 06/21/2021       CBC RESULTS:  Lab Results   Component Value Date    WBC 8.4 07/29/2021    WBC 6.9 07/09/2020    RBC 4.56 07/29/2021    RBC 4.58 07/09/2020    HGB 14.3 07/29/2021    HGB 14.2 07/09/2020    HCT 42.0 07/29/2021    HCT 42.9 07/09/2020    MCV 92 07/29/2021    MCV 94 07/09/2020    MCH 31.4 07/29/2021    MCH 31.0 07/09/2020    MCHC 34.0 07/29/2021    MCHC 33.1 07/09/2020    RDW 13.7 07/29/2021    RDW 13.7 07/09/2020     07/29/2021     (L) 07/09/2020       BMP RESULTS:  Lab Results   Component Value Date     06/05/2023     06/21/2021    POTASSIUM 3.9 06/05/2023    POTASSIUM 3.4 07/22/2022    POTASSIUM 4.2 06/21/2021    CHLORIDE 104  06/05/2023    CHLORIDE 106 07/22/2022    CHLORIDE 107 06/21/2021    CO2 20 (L) 06/05/2023    CO2 29 07/22/2022    CO2 24 06/21/2021    ANIONGAP 14 06/05/2023    ANIONGAP 5 07/22/2022    ANIONGAP 5 06/21/2021     (H) 06/05/2023     (H) 07/22/2022     (H) 06/21/2021    BUN 26.2 (H) 06/05/2023    BUN 27 07/22/2022    BUN 32 (H) 06/21/2021    CR 1.27 (H) 06/05/2023    CR 1.30 (H) 06/21/2021    GFRESTIMATED 62 06/05/2023    GFRESTIMATED 57 (L) 06/21/2021    GFRESTBLACK 66 06/21/2021    MARGA 9.3 06/05/2023    MARGA 8.8 06/21/2021        A1C RESULTS:  Lab Results   Component Value Date    A1C 5.6 06/29/2022    A1C 5.6 01/26/2016       INR RESULTS:  Lab Results   Component Value Date    INR 1.05 07/29/2021    INR 1.09 07/09/2020    INR 1.09 02/01/2016           CC  Nellie Singh, VIMAL  [address] [address]    Service Date: 06/06/2023    Aiden Almaraz returns for followup.  He is a delightful 67-year-old gentleman.  He has heart failure with preserved ejection fraction.  He has metabolic syndrome type numbers, probably slight Pickwickian type symptoms.  He has only mild non-flow limiting coronary disease.  He ended up going to [facility] for a second opinion and they did not find anything different than we did and he has returned his care to us.  He does, however, have more advanced bladder cancer so he goes to [facility] regularly for some more advanced therapy.  He also has a history of concussion and he also goes to [facility] for therapy there.  Today we reviewed his [facility] Labs.  They recently did a creatinine.  We reviewed our BMP and our lipid numbers.  His triglycerides are a little higher than they normally are.  He is on Farxiga.  Initially, his insurance company denied it when we ordered that, but he is on Farxiga.  His weight is actually down 5-plus pounds since his last visit. He still runs out of energy a little bit and gets a little winded when he goes for a walk.  He  does not have supine symptoms when he is sleeping.  He does not have any resting symptoms.  From a cardiac standpoint, I think he is quite stable.  I am not going to change any of his medications.  He will continue to work on diet and exercise, and as I mentioned, his weight is already down at least 5 pounds from last year.  We will see him back in 1 year.  I will do an echocardiogram, lipid profile and a BMP.    Total consult time 35 minutes, includes the time to review the [facility] records, including his blood work there, and review his urologic treatment, our blood pressure numbers, our cholesterol numbers, our electrolytes and creatinine numbers and review of his medications.    Dago Mckeon MD    cc:  Calvin Desai MD  [address] [place]  [address]      Dago Mckeon MD        D: 2023   T: 2023   MT: [email or web address]    Name:     MATY NGUYỄN  MRN:      [id number]        Account:      [id number]   :      1955           Service Date: 2023       Document: [id number]     Thank you for allowing me to participate in the care of your patient.      Sincerely,     Dago Mckeon MD     [facility]  cc:   Nellie Singh NP  [address] [address]

## 2023-06-06 NOTE — PROGRESS NOTES
Service Date: 06/06/2023    Aiden Almaraz returns for followup.  He is a delightful 67-year-old gentleman.  He has heart failure with preserved ejection fraction.  He has metabolic syndrome type numbers, probably slight Pickwickian type symptoms.  He has only mild non-flow limiting coronary disease.  He ended up going to [facility] for a second opinion and they did not find anything different than we did and he has returned his care to us.  He does, however, have more advanced bladder cancer so he goes to [facility] regularly for some more advanced therapy.  He also has a history of concussion and he also goes to [facility] for therapy there.  Today we reviewed his [facility] Labs.  They recently did a creatinine.  We reviewed our BMP and our lipid numbers.  His triglycerides are a little higher than they normally are.  He is on Farxiga.  Initially, his insurance company denied it when we ordered that, but he is on Farxiga.  His weight is actually down 5-plus pounds since his last visit. He still runs out of energy a little bit and gets a little winded when he goes for a walk.  He does not have supine symptoms when he is sleeping.  He does not have any resting symptoms.  From a cardiac standpoint, I think he is quite stable.  I am not going to change any of his medications.  He will continue to work on diet and exercise, and as I mentioned, his weight is already down at least 5 pounds from last year.  We will see him back in 1 year.  I will do an echocardiogram, lipid profile and a BMP.    Total consult time 35 minutes, includes the time to review the [facility] records, including his blood work there, and review his urologic treatment, our blood pressure numbers, our cholesterol numbers, our electrolytes and creatinine numbers and review of his medications.    Dago Mckeon MD    cc:  Calvin Desai MD  [address] [place]  [address]      Dago Mckeon,  MD        D: 2023   T: 2023   MT: [email or web address]    Name:     MATY NGUYỄN  MRN:      [id number]        Account:      [id number]   :      1955           Service Date: 2023       Document: [id number]

## 2023-06-06 NOTE — PROGRESS NOTES
HPI and Plan:   See dictation    Orders Placed This Encounter   Procedures     Basic metabolic panel     Lipid Profile     Follow-Up with Cardiology     Echocardiogram Complete     No orders of the defined types were placed in this encounter.    There are no discontinued medications.      Encounter Diagnoses   Name Primary?     Heart failure with preserved ejection fraction, NYHA class I (H)      Coronary artery disease involving native coronary artery of native heart without angina pectoris Yes       CURRENT MEDICATIONS:  Current Outpatient Medications   Medication Sig Dispense Refill     acetaminophen (TYLENOL) 500 MG tablet Take 500-1,000 mg by mouth every 6 hours as needed for mild pain       aspirin 81 MG tablet Take 1 tablet (81 mg) by mouth daily 30 tablet      atorvastatin (LIPITOR) 20 MG tablet Take 1 tablet (20 mg) by mouth daily 90 tablet 3     buPROPion (WELLBUTRIN XL) 150 MG 24 hr tablet Take 150 mg by mouth       cholecalciferol 50 MCG (2000 UT) tablet Take 2,000 Units by mouth daily       cyclobenzaprine (FLEXERIL) 5 MG tablet Take 5 mg by mouth daily as needed       dapagliflozin (FARXIGA) 10 MG TABS tablet Take 1 tablet (10 mg) by mouth daily 90 tablet 3     FLUoxetine (PROZAC) 20 MG capsule Take 40 mg by mouth daily       gabapentin (NEURONTIN) 100 MG capsule Take 100 mg by mouth 3 times daily       ipratropium (ATROVENT) 0.06 % nasal spray Spray 2 sprays into both nostrils 2 times daily       meclizine 25 MG CHEW Take 25 mg by mouth every 6 hours as needed for dizziness       Respiratory Therapy Supplies (CARETOUCH 2 CPAP HOSE ) MISC CPAP machine for home use at pressure 15 cmw, full face mask x1/3month with a full face cushion x1/mo       spironolactone (ALDACTONE) 25 MG tablet Take 1 tablet (25 mg) by mouth daily 90 tablet 3     torsemide (DEMADEX) 10 MG tablet Take 1 tablet (10 mg) by mouth daily 1 tab daily 90 tablet 3     mirabegron (MYRBETRIQ) 50 MG 24 hr tablet Take 1 tablet by mouth  daily (Patient not taking: Reported on 1/23/2023)         ALLERGIES     Allergies   Allergen Reactions     Seasonal Allergies      Statins        PAST MEDICAL HISTORY:  Past Medical History:   Diagnosis Date     Corey's esophagus      Bladder cancer (H) 2022    high grade, followed at [place]     CAD (coronary artery disease) 02/2016    mild, nonobstructive per cath 2016     Concussion 2016    fall went to ER--now with dizzy and memory issue     Degenerative disc disease, lumbar      Depression with anxiety      Diastolic congestive heart failure (H)     non compliant LV, HFpEF     Diastolic heart failure (H)      Gastroesophageal reflux disease 1999    lap nissen at [facility] june 1999     HTN (hypertension)      Hyperlipidemia      IBS (irritable bowel syndrome)     on colestid for diarrhea not cholesterol     Impaired glucose tolerance      Lichen simplex chronicus      Metabolic syndrome      Mild ascending aorta dilation (H)     40mm     Osteomyelitis of right leg (H) 1979     Restrictive lung disease     Dr No, mild restriction PFT     Sleep apnea     cpap     Venous disease     lower extremity       PAST SURGICAL HISTORY:  Past Surgical History:   Procedure Laterality Date     CARPAL TUNNEL RELEASE RT/LT Bilateral      CHOLECYSTECTOMY       CV RIGHT HEART CATH MEASUREMENTS RECORDED Right 7/9/2020    Procedure: Right Heart Cath WITH FULL OXIMETRY;  Surgeon: Boaz Bustillo MD;  Location:  HEART CARDIAC CATH LAB     CV RIGHT HEART CATH MEASUREMENTS RECORDED N/A 7/29/2021    Procedure: Right Heart Cath rest and exercise with elana in [place];  Surgeon: Dago Mckeon MD;  Location:  HEART CARDIAC CATH LAB     CV RIGHT HEART EXERCISE STRESS STUDY N/A 7/29/2021    Procedure: Right Heart Exercise Stress Study;  Surgeon: Dago Mckeon MD;  Location:  HEART CARDIAC CATH LAB     KNEE SURGERY      arthroscopic (pt doesn't recall which knee)     NISSEN FUNDOPLICATION       ROTATOR CUFF  "REPAIR RT/LT Right        FAMILY HISTORY:  Family History   Problem Relation Age of Onset     Other Cancer Mother      Hypertension Mother      Colon Cancer Father      Asthma Father        SOCIAL HISTORY:  Social History     Socioeconomic History     Marital status:      Spouse name: None     Number of children: None     Years of education: None     Highest education level: None   Occupational History     Occupation:      Comment: service food machines   Tobacco Use     Smoking status: Never     Smokeless tobacco: Never   Substance and Sexual Activity     Alcohol use: Yes     Alcohol/week: 0.0 standard drinks of alcohol     Comment: occ   Other Topics Concern     Caffeine Concern No     Comment: occ     Special Diet No     Exercise No       Review of Systems:  Skin:  not assessed     Eyes:  Positive for glasses  ENT:  not assessed    Respiratory:  Positive for cough;shortness of breath;sleep apnea;CPAP  Cardiovascular:    lightheadedness;dizziness;Positive for  Gastroenterology: not assessed    Genitourinary:  not assessed    Musculoskeletal:  not assessed    Neurologic:  not assessed    Psychiatric:  not assessed    Heme/Lymph/Imm:  not assessed    Endocrine:  not assessed      Physical Exam:  Vitals: /72 (BP Location: Right arm, Patient Position: Sitting, Cuff Size: Adult Large)   Pulse 63   Ht 1.753 m (5' 9\")   Wt 117.1 kg (258 lb 1.6 oz)   SpO2 98%   BMI 38.11 kg/m      Constitutional:  cooperative, alert and oriented, well developed, well nourished, in no acute distress        Skin:  warm and dry to the touch, no apparent skin lesions or masses noted          Head:  normocephalic, no masses or lesions        Eyes:  pupils equal and round, conjunctivae and lids unremarkable, sclera white, no xanthalasma, EOMS intact, no nystagmus        Lymph:No Cervical lymphadenopathy present;No thyromegaly     ENT:           Neck:  carotid pulses are full and equal bilaterally, JVP normal, " no carotid bruit        Respiratory:  normal breath sounds, clear to auscultation, normal A-P diameter, normal symmetry, normal respiratory excursion, no use of accessory muscles         Cardiac: regular rhythm, normal S1/S2, no S3 or S4, apical impulse not displaced, no murmurs, gallops or rubs                pulses full and equal, no bruits auscultated                                        GI:  abdomen soft, non-tender, BS normoactive, no mass, no HSM, no bruits obese difficult exam due to pannus    Extremities and Muscular Skeletal:  no deformities, clubbing, cyanosis, erythema observed;no edema         hemosiderin deposits ankles but no fluid now    Neurological:  no gross motor deficits        Psych:  Alert and Oriented x 3      Recent Lab Results:  LIPID RESULTS:  Lab Results   Component Value Date    CHOL 121 06/05/2023    CHOL 106 06/21/2021    HDL 31 (L) 06/05/2023    HDL 35 (L) 06/21/2021    LDL 32 06/05/2023    LDL 38 06/21/2021    TRIG 292 (H) 06/05/2023    TRIG 164 (H) 06/21/2021       LIVER ENZYME RESULTS:  Lab Results   Component Value Date    ALT 22 06/05/2023    ALT 34 06/21/2021       CBC RESULTS:  Lab Results   Component Value Date    WBC 8.4 07/29/2021    WBC 6.9 07/09/2020    RBC 4.56 07/29/2021    RBC 4.58 07/09/2020    HGB 14.3 07/29/2021    HGB 14.2 07/09/2020    HCT 42.0 07/29/2021    HCT 42.9 07/09/2020    MCV 92 07/29/2021    MCV 94 07/09/2020    MCH 31.4 07/29/2021    MCH 31.0 07/09/2020    MCHC 34.0 07/29/2021    MCHC 33.1 07/09/2020    RDW 13.7 07/29/2021    RDW 13.7 07/09/2020     07/29/2021     (L) 07/09/2020       BMP RESULTS:  Lab Results   Component Value Date     06/05/2023     06/21/2021    POTASSIUM 3.9 06/05/2023    POTASSIUM 3.4 07/22/2022    POTASSIUM 4.2 06/21/2021    CHLORIDE 104 06/05/2023    CHLORIDE 106 07/22/2022    CHLORIDE 107 06/21/2021    CO2 20 (L) 06/05/2023    CO2 29 07/22/2022    CO2 24 06/21/2021    ANIONGAP 14 06/05/2023    ANIONGAP  5 07/22/2022    ANIONGAP 5 06/21/2021     (H) 06/05/2023     (H) 07/22/2022     (H) 06/21/2021    BUN 26.2 (H) 06/05/2023    BUN 27 07/22/2022    BUN 32 (H) 06/21/2021    CR 1.27 (H) 06/05/2023    CR 1.30 (H) 06/21/2021    GFRESTIMATED 62 06/05/2023    GFRESTIMATED 57 (L) 06/21/2021    GFRESTBLACK 66 06/21/2021    MARGA 9.3 06/05/2023    MARGA 8.8 06/21/2021        A1C RESULTS:  Lab Results   Component Value Date    A1C 5.6 06/29/2022    A1C 5.6 01/26/2016       INR RESULTS:  Lab Results   Component Value Date    INR 1.05 07/29/2021    INR 1.09 07/09/2020    INR 1.09 02/01/2016           HOMERO Singh, VIMAL  [id number] [id number]

## 2023-07-17 DIAGNOSIS — I50.30 HEART FAILURE WITH PRESERVED EJECTION FRACTION, NYHA CLASS I (H): ICD-10-CM

## 2023-07-17 DIAGNOSIS — I26.09 ACUTE COR PULMONALE (H): ICD-10-CM

## 2023-07-17 RX ORDER — TORSEMIDE 10 MG/1
10 TABLET ORAL DAILY
Qty: 90 TABLET | Refills: 3 | Status: SHIPPED | OUTPATIENT
Start: 2023-07-17

## 2023-07-17 RX ORDER — SPIRONOLACTONE 25 MG/1
25 TABLET ORAL DAILY
Qty: 90 TABLET | Refills: 3 | Status: SHIPPED | OUTPATIENT
Start: 2023-07-17

## 2023-07-17 NOTE — PROGRESS NOTES
[facility] Refill Guideline reviewed.  Medication meets criteria for refill. RAEANN Quevedo RN

## 2023-10-19 DIAGNOSIS — I50.30 HEART FAILURE WITH PRESERVED EJECTION FRACTION, NYHA CLASS I (H): ICD-10-CM

## 2023-10-19 RX ORDER — DAPAGLIFLOZIN 10 MG/1
10 TABLET, FILM COATED ORAL DAILY
Qty: 90 TABLET | Refills: 2 | Status: SHIPPED | OUTPATIENT
Start: 2023-10-19 | End: 2024-08-27

## 2024-06-08 ENCOUNTER — HEALTH MAINTENANCE LETTER (OUTPATIENT)
Age: 69
End: 2024-06-08

## 2024-06-10 ENCOUNTER — TRANSFERRED RECORDS (OUTPATIENT)
Dept: HEALTH INFORMATION MANAGEMENT | Facility: CLINIC | Age: 69
End: 2024-06-10
Payer: MEDICARE

## 2024-06-20 ENCOUNTER — LAB (OUTPATIENT)
Dept: LAB | Facility: CLINIC | Age: 69
End: 2024-06-20
Payer: MEDICARE

## 2024-06-20 ENCOUNTER — HOSPITAL ENCOUNTER (OUTPATIENT)
Dept: CARDIOLOGY | Facility: CLINIC | Age: 69
Discharge: HOME | End: 2024-06-20
Attending: INTERNAL MEDICINE | Admitting: INTERNAL MEDICINE
Payer: MEDICARE

## 2024-06-20 DIAGNOSIS — I50.30 HEART FAILURE WITH PRESERVED EJECTION FRACTION, NYHA CLASS I (H): ICD-10-CM

## 2024-06-20 DIAGNOSIS — I25.10 CORONARY ARTERY DISEASE INVOLVING NATIVE CORONARY ARTERY OF NATIVE HEART WITHOUT ANGINA PECTORIS: ICD-10-CM

## 2024-06-20 LAB
ANION GAP SERPL CALCULATED.3IONS-SCNC: 14 MMOL/L (ref 7–15)
BUN SERPL-MCNC: 26.7 MG/DL (ref 8–23)
CALCIUM SERPL-MCNC: 9 MG/DL (ref 8.8–10.2)
CHLORIDE SERPL-SCNC: 103 MMOL/L (ref 98–107)
CHOLEST SERPL-MCNC: 114 MG/DL
CREAT SERPL-MCNC: 1.26 MG/DL (ref 0.67–1.17)
DEPRECATED HCO3 PLAS-SCNC: 21 MMOL/L (ref 22–29)
EGFRCR SERPLBLD CKD-EPI 2021: 62 ML/MIN/1.73M2
FASTING STATUS PATIENT QL REPORTED: YES
GLUCOSE SERPL-MCNC: 113 MG/DL (ref 70–99)
HDLC SERPL-MCNC: 32 MG/DL
LDLC SERPL CALC-MCNC: 41 MG/DL
LVEF ECHO: NORMAL
NONHDLC SERPL-MCNC: 82 MG/DL
POTASSIUM SERPL-SCNC: 3.9 MMOL/L (ref 3.4–5.3)
SODIUM SERPL-SCNC: 138 MMOL/L (ref 135–145)
TRIGL SERPL-MCNC: 204 MG/DL

## 2024-06-20 PROCEDURE — 93306 TTE W/DOPPLER COMPLETE: CPT | Mod: 26 | Performed by: INTERNAL MEDICINE

## 2024-06-20 PROCEDURE — 80048 BASIC METABOLIC PNL TOTAL CA: CPT | Performed by: INTERNAL MEDICINE

## 2024-06-20 PROCEDURE — 36415 COLL VENOUS BLD VENIPUNCTURE: CPT | Performed by: INTERNAL MEDICINE

## 2024-06-20 PROCEDURE — 93306 TTE W/DOPPLER COMPLETE: CPT

## 2024-06-20 PROCEDURE — 80061 LIPID PANEL: CPT | Performed by: INTERNAL MEDICINE

## 2024-06-21 ENCOUNTER — OFFICE VISIT (OUTPATIENT)
Dept: CARDIOLOGY | Facility: CLINIC | Age: 69
End: 2024-06-21
Payer: MEDICARE

## 2024-06-21 VITALS
DIASTOLIC BLOOD PRESSURE: 64 MMHG | HEIGHT: 69 IN | OXYGEN SATURATION: 94 % | SYSTOLIC BLOOD PRESSURE: 116 MMHG | WEIGHT: 265.8 LBS | HEART RATE: 60 BPM | BODY MASS INDEX: 39.37 KG/M2

## 2024-06-21 DIAGNOSIS — G47.33 OBSTRUCTIVE SLEEP APNEA SYNDROME: ICD-10-CM

## 2024-06-21 DIAGNOSIS — I71.40 ABDOMINAL AORTIC ANEURYSM (AAA) WITHOUT RUPTURE, UNSPECIFIED PART (H): ICD-10-CM

## 2024-06-21 DIAGNOSIS — I50.30 HEART FAILURE WITH PRESERVED EJECTION FRACTION, NYHA CLASS I (H): Primary | ICD-10-CM

## 2024-06-21 PROCEDURE — 99214 OFFICE O/P EST MOD 30 MIN: CPT | Performed by: INTERNAL MEDICINE

## 2024-06-21 RX ORDER — FLUTICASONE PROPIONATE 50 MCG
2 SPRAY, SUSPENSION (ML) NASAL DAILY
COMMUNITY

## 2024-06-21 RX ORDER — CETIRIZINE HYDROCHLORIDE 10 MG/1
1 CAPSULE, LIQUID FILLED ORAL DAILY
COMMUNITY

## 2024-06-21 RX ORDER — LAMOTRIGINE 25 MG/1
12.5 TABLET ORAL DAILY
COMMUNITY
Start: 2024-04-10

## 2024-06-21 NOTE — LETTER
6/21/2024    Calvin Desai  [facility] [address]  [facility] [id number]    RE: Aiden Almaraz       Dear Colleague,     I had the pleasure of seeing Aiden Almaraz in the [facility].  HPI and Plan:   I had the pleasure of following up with [place].  The patient has for a number of years.  His most kind gentleman.  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    Symptoms a combination of stiff heart history of hypertension diastolic dysfunction.  He is going to [facility] and make concurred with us made no additional changes although he been on Coreg hydralazine ACE inhibitors in the past he is currently on spironolactone and Demadex he is doing well with that.  He is also on SGLT2 medications for additional heart benefit even though he is prediabetic and not wrinkly diabetic.    His weight is still up with a BMI of 39.25 I asked him to talk to his family doctor about an GLP-1 medication in place of SGLT2 to determine if weight loss may be more beneficial with still some cardiac benefits from those diabetes type drugs    We reviewed labs from [facility] he has abdominal aortic aneurysm 39 mm extending slightly into the iliacs I think he should probably get another ultrasound in 2 to 3 years.  We reviewed his heart catheterization numbers from before I reviewed the new echo again he has normal LV size and function right ventricle is moderately dilated.  There is no significant valvular heart disease.  We are not able to estimate RVSP since there was still little tricuspid insufficiency    Electrolytes are normal creatinine is mildly high but  stable hemoglobin A1c has not been checked for a while but it was normal fasting sugar was a little high.    At this point I made no changes I have left it up to the patient he wants to talk to his family doctor about GLP-1 medication.  I would like to see him back in 2 years we will do electrolytes and an echocardiogram and he should probably have an abdominal ultrasound of his aorta he also tells me that they did a carotid ultrasound I was not able to find that since it was not done through our system.  Today's visit was 45 minutes extensive counseling was performed talking about his cholesterol numbers the aorta what numbers we talked about for surgery prevention measures with blood pressure creatinine function diabetes and the diabetes type meds even though it is being used for heart failure and diastolic dysfunction    Orders Placed This Encounter   Procedures    Follow-Up with Cardiologist     Orders Placed This Encounter   Medications    lamoTRIgine (LAMICTAL) 25 MG tablet     Sig: Take 12.5 mg by mouth daily    cetirizine HCl (ZYRTEC ALLERGY) 10 MG CAPS     Sig: Take 1 tablet by mouth daily    FLUoxetine (PROZAC) 20 MG capsule     Sig: Take 20 mg by mouth daily    omeprazole (PRILOSEC) 20 MG DR capsule     Sig: Take 20 mg by mouth daily    fluticasone (FLONASE) 50 MCG/ACT nasal spray     Sig: Spray 2 sprays in nostril daily     There are no discontinued medications.      Encounter Diagnoses   Name Primary?    Heart failure with preserved ejection fraction, NYHA class I (H) Yes    Obstructive sleep apnea syndrome     Abdominal aortic aneurysm (AAA) without rupture, unspecified part (H24)        CURRENT MEDICATIONS:  Current Outpatient Medications   Medication Sig Dispense Refill    acetaminophen (TYLENOL) 500 MG tablet Take 500-1,000 mg by mouth every 6 hours as needed for mild pain      aspirin 81 MG tablet Take 1 tablet (81 mg) by mouth daily 30 tablet     atorvastatin (LIPITOR) 20 MG tablet Take 1 tablet  (20 mg) by mouth daily 90 tablet 3    cetirizine HCl (ZYRTEC ALLERGY) 10 MG CAPS Take 1 tablet by mouth daily      cholecalciferol 50 MCG (2000 UT) tablet Take 2,000 Units by mouth daily      dapagliflozin (FARXIGA) 10 MG TABS tablet Take 1 tablet (10 mg) by mouth daily 90 tablet 2    FLUoxetine (PROZAC) 20 MG capsule Take 20 mg by mouth daily      FLUoxetine (PROZAC) 20 MG capsule Take 40 mg by mouth daily      fluticasone (FLONASE) 50 MCG/ACT nasal spray Spray 2 sprays in nostril daily      gabapentin (NEURONTIN) 100 MG capsule Take 100 mg by mouth 3 times daily      ipratropium (ATROVENT) 0.06 % nasal spray Spray 2 sprays into both nostrils 2 times daily      lamoTRIgine (LAMICTAL) 25 MG tablet Take 12.5 mg by mouth daily      meclizine 25 MG CHEW Take 25 mg by mouth every 6 hours as needed for dizziness      omeprazole (PRILOSEC) 20 MG DR capsule Take 20 mg by mouth daily      Respiratory Therapy Supplies (CARETOUCH 2 CPAP HOSE ) MISC CPAP machine for home use at pressure 15 cmw, full face mask x1/3month with a full face cushion x1/mo      spironolactone (ALDACTONE) 25 MG tablet Take 1 tablet (25 mg) by mouth daily 90 tablet 3    torsemide (DEMADEX) 10 MG tablet Take 1 tablet (10 mg) by mouth daily 1 tab daily 90 tablet 3    buPROPion (WELLBUTRIN XL) 150 MG 24 hr tablet Take 150 mg by mouth      cyclobenzaprine (FLEXERIL) 5 MG tablet Take 5 mg by mouth daily as needed (Patient not taking: Reported on 6/21/2024)      mirabegron (MYRBETRIQ) 50 MG 24 hr tablet Take 1 tablet by mouth daily (Patient not taking: Reported on 1/23/2023)         ALLERGIES     Allergies   Allergen Reactions    Seasonal Allergies     Statins        PAST MEDICAL HISTORY:  Past Medical History:   Diagnosis Date    Corey's esophagus     Bladder cancer (H) 2022    high grade, followed at [facility]    CAD (coronary artery disease) 02/2016    mild, nonobstructive per cath 2016    Concussion 2016    fall went to ER--now with dizzy and memory  issue    Degenerative disc disease, lumbar     Depression with anxiety     Diastolic congestive heart failure (H)     non compliant LV, HFpEF    Diastolic heart failure (H)     Gastroesophageal reflux disease 1999    lap nissen at [facility] june 1999    HTN (hypertension)     Hyperlipidemia     IBS (irritable bowel syndrome)     on colestid for diarrhea not cholesterol    Impaired glucose tolerance     Lichen simplex chronicus     Metabolic syndrome     Mild ascending aorta dilation (H24)     40mm    Osteomyelitis of right leg (H) 1979    Restrictive lung disease     Dr No, mild restriction PFT    Sleep apnea     cpap    Venous disease     lower extremity       PAST SURGICAL HISTORY:  Past Surgical History:   Procedure Laterality Date    CARPAL TUNNEL RELEASE RT/LT Bilateral     CHOLECYSTECTOMY      CV RIGHT HEART CATH MEASUREMENTS RECORDED Right 7/9/2020    Procedure: Right Heart Cath WITH FULL OXIMETRY;  Surgeon: Boaz Bustillo MD;  Location:  HEART CARDIAC CATH LAB    CV RIGHT HEART CATH MEASUREMENTS RECORDED N/A 7/29/2021    Procedure: Right Heart Cath rest and exercise with elana in [place];  Surgeon: Dago Mckeon MD;  Location:  HEART CARDIAC CATH LAB    CV RIGHT HEART EXERCISE STRESS STUDY N/A 7/29/2021    Procedure: Right Heart Exercise Stress Study;  Surgeon: Dago Mckeon MD;  Location:  HEART CARDIAC CATH LAB    KNEE SURGERY      arthroscopic (pt doesn't recall which knee)    NISSEN FUNDOPLICATION      ROTATOR CUFF REPAIR RT/LT Right        FAMILY HISTORY:  Family History   Problem Relation Age of Onset    Other Cancer Mother     Hypertension Mother     Colon Cancer Father     Asthma Father        SOCIAL HISTORY:  Social History     Socioeconomic History    Marital status:      Spouse name: None    Number of children: None    Years of education: None    Highest education level: None   Occupational History    Occupation:      Comment: service  food machines   Tobacco Use    Smoking status: Never    Smokeless tobacco: Never   Substance and Sexual Activity    Alcohol use: Yes     Alcohol/week: 0.0 standard drinks of alcohol     Comment: occ   Other Topics Concern    Caffeine Concern No     Comment: occ    Special Diet No    Exercise No     Social Determinants of Health     Financial Resource Strain: Low Risk  (7/3/2023)    Received from [facility]    Overall Financial Resource Strain (CARDIA)     Difficulty of Paying Living Expenses: Not hard at all   Food Insecurity: No Food Insecurity (3/21/2024)    Received from [facility]    Hunger Vital Sign     Worried About Running Out of Food in the Last Year: Never true     Ran Out of Food in the Last Year: Never true   Transportation Needs: No Transportation Needs (3/21/2024)    Received from [facility]    PRAPARE - Transportation     Lack of Transportation (Medical): No     Lack of Transportation (Non-Medical): No   Physical Activity: Insufficiently Active (3/21/2024)    Received from [facility]    Exercise Vital Sign     Days of Exercise per Week: 1 day     Minutes of Exercise per Session: 10 min   Stress: Stress Concern Present (2/26/2023)    Received from [facility], [facility]    [place] [employer] - Occupational Stress Questionnaire     Feeling of Stress : To some extent   Social Connections: Socially Integrated (2/26/2023)    Received from [facility], [facility]    Social Connection and Isolation Panel [NHANES]     Frequency of Communication with Friends and Family: More than three times a week     Frequency of Social Gatherings with Friends and Family: Twice a week     Attends [employer] Services: More than 4 times per year     Active Member of Clubs or Organizations: Yes     Attends Club or Organization Meetings: More than 4 times per year     Marital Status:    Interpersonal Safety: Not At Risk (7/3/2023)    Received from [facility]    Humiliation, Afraid, Rape, and  "Kick questionnaire     Fear of Current or Ex-Partner: No     Emotionally Abused: No     Physically Abused: No     Sexually Abused: No   Housing Stability: Low Risk  (3/21/2024)    Received from [facility]    Housing Stability     What is your living situation today?: I have a steady place to live       Review of Systems:  Skin:  not assessed     Eyes:  not assessed glasses  ENT:  not assessed    Respiratory:  Positive for cough;shortness of breath;sleep apnea;CPAP  Cardiovascular:    dizziness;Positive for;edema;fatigue  Gastroenterology: not assessed    Genitourinary:  not assessed    Musculoskeletal:  not assessed    Neurologic:  not assessed    Psychiatric:  not assessed    Heme/Lymph/Imm:  not assessed    Endocrine:  not assessed      Physical Exam:  Vitals: /64 (BP Location: Right arm, Patient Position: Sitting, Cuff Size: Adult Large)   Pulse 60   Ht 1.753 m (5' 9\")   Wt 120.6 kg (265 lb 12.8 oz)   SpO2 94%   BMI 39.25 kg/m   Orthostatic Vitals from 06/19/24 0847 to 06/21/24 0847    Date and Time Orthostatic BP Orthostatic Pulse Patient Position BP   Location Cuff Size   06/21/24 0753 -- -- Sitting Right arm Adult Large         Constitutional:           Skin:             Head:           Eyes:           Lymph:      ENT:           Neck:           Respiratory:            Cardiac:                                                           GI:           Extremities and Muscular Skeletal:                 Neurological:           Psych:         Recent Lab Results:  LIPID RESULTS:  Lab Results   Component Value Date    CHOL 114 06/20/2024    CHOL 106 06/21/2021    HDL 32 (L) 06/20/2024    HDL 35 (L) 06/21/2021    LDL 41 06/20/2024    LDL 38 06/21/2021    TRIG 204 (H) 06/20/2024    TRIG 164 (H) 06/21/2021       LIVER ENZYME RESULTS:  Lab Results   Component Value Date    ALT 22 06/05/2023    ALT 34 06/21/2021       CBC RESULTS:  Lab Results   Component Value Date    WBC 8.4 07/29/2021    WBC 6.9 07/09/2020    " RBC 4.56 07/29/2021    RBC 4.58 07/09/2020    HGB 14.3 07/29/2021    HGB 14.2 07/09/2020    HCT 42.0 07/29/2021    HCT 42.9 07/09/2020    MCV 92 07/29/2021    MCV 94 07/09/2020    MCH 31.4 07/29/2021    MCH 31.0 07/09/2020    MCHC 34.0 07/29/2021    MCHC 33.1 07/09/2020    RDW 13.7 07/29/2021    RDW 13.7 07/09/2020     07/29/2021     (L) 07/09/2020       BMP RESULTS:  Lab Results   Component Value Date     06/20/2024     06/21/2021    POTASSIUM 3.9 06/20/2024    POTASSIUM 3.4 07/22/2022    POTASSIUM 4.2 06/21/2021    CHLORIDE 103 06/20/2024    CHLORIDE 106 07/22/2022    CHLORIDE 107 06/21/2021    CO2 21 (L) 06/20/2024    CO2 29 07/22/2022    CO2 24 06/21/2021    ANIONGAP 14 06/20/2024    ANIONGAP 5 07/22/2022    ANIONGAP 5 06/21/2021     (H) 06/20/2024     (H) 07/22/2022     (H) 06/21/2021    BUN 26.7 (H) 06/20/2024    BUN 27 07/22/2022    BUN 32 (H) 06/21/2021    CR 1.26 (H) 06/20/2024    CR 1.30 (H) 06/21/2021    GFRESTIMATED 62 06/20/2024    GFRESTIMATED 57 (L) 06/21/2021    GFRESTBLACK 66 06/21/2021    MARGA 9.0 06/20/2024    MARGA 8.8 06/21/2021        A1C RESULTS:  Lab Results   Component Value Date    A1C 5.6 06/29/2022    A1C 5.6 01/26/2016       INR RESULTS:  Lab Results   Component Value Date    INR 1.05 07/29/2021    INR 1.09 07/09/2020    INR 1.09 02/01/2016           CC  No referring provider defined for this encounter.      Thank you for allowing me to participate in the care of your patient.      Sincerely,     Dago Mckeon MD     [facility]

## 2024-06-21 NOTE — PROGRESS NOTES
HPI and Plan:   I had the pleasure of following up with [place].  The patient has for a number of years.  His most kind gentleman.  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    Symptoms a combination of stiff heart history of hypertension diastolic dysfunction.  He is going to [facility] ER and make concurred with us made no additional changes although he been on Coreg hydralazine ACE inhibitors in the past he is currently on spironolactone and Demadex he is doing well with that.  He is also on SGLT2 medications for additional heart benefit even though he is prediabetic and not wrinkly diabetic.    His weight is still up with a BMI of 39.25 I asked him to talk to his family doctor about an GLP-1 medication in place of SGLT2 to determine if weight loss may be more beneficial with still some cardiac benefits from those diabetes type drugs    We reviewed labs from [place] he has abdominal aortic aneurysm 39 mm extending slightly into the iliacs I think he should probably get another ultrasound in 2 to 3 years.  We reviewed his heart catheterization numbers from before I reviewed the new echo again he has normal LV size and function right ventricle is moderately dilated.  There is no significant valvular heart disease.  We are not able to estimate RVSP since there was still little tricuspid insufficiency    Electrolytes are normal creatinine is mildly high but stable hemoglobin A1c has not been checked for a while but it was normal fasting sugar was a little high.    At this point I made no changes I have left it up to the patient he wants to talk to his family doctor about GLP-1  medication.  I would like to see him back in 2 years we will do electrolytes and an echocardiogram and he should probably have an abdominal ultrasound of his aorta he also tells me that they did a carotid ultrasound I was not able to find that since it was not done through our system.  Today's visit was 45 minutes extensive counseling was performed talking about his cholesterol numbers the aorta what numbers we talked about for surgery prevention measures with blood pressure creatinine function diabetes and the diabetes type meds even though it is being used for heart failure and diastolic dysfunction    Orders Placed This Encounter   Procedures    Follow-Up with Cardiologist     Orders Placed This Encounter   Medications    lamoTRIgine (LAMICTAL) 25 MG tablet     Sig: Take 12.5 mg by mouth daily    cetirizine HCl (ZYRTEC ALLERGY) 10 MG CAPS     Sig: Take 1 tablet by mouth daily    FLUoxetine (PROZAC) 20 MG capsule     Sig: Take 20 mg by mouth daily    omeprazole (PRILOSEC) 20 MG DR capsule     Sig: Take 20 mg by mouth daily    fluticasone (FLONASE) 50 MCG/ACT nasal spray     Sig: Spray 2 sprays in nostril daily     There are no discontinued medications.      Encounter Diagnoses   Name Primary?    Heart failure with preserved ejection fraction, NYHA class I (H) Yes    Obstructive sleep apnea syndrome     Abdominal aortic aneurysm (AAA) without rupture, unspecified part (H24)        CURRENT MEDICATIONS:  Current Outpatient Medications   Medication Sig Dispense Refill    acetaminophen (TYLENOL) 500 MG tablet Take 500-1,000 mg by mouth every 6 hours as needed for mild pain      aspirin 81 MG tablet Take 1 tablet (81 mg) by mouth daily 30 tablet     atorvastatin (LIPITOR) 20 MG tablet Take 1 tablet (20 mg) by mouth daily 90 tablet 3    cetirizine HCl (ZYRTEC ALLERGY) 10 MG CAPS Take 1 tablet by mouth daily      cholecalciferol 50 MCG (2000 UT) tablet Take 2,000 Units by mouth daily      dapagliflozin (FARXIGA) 10 MG  TABS tablet Take 1 tablet (10 mg) by mouth daily 90 tablet 2    FLUoxetine (PROZAC) 20 MG capsule Take 20 mg by mouth daily      FLUoxetine (PROZAC) 20 MG capsule Take 40 mg by mouth daily      fluticasone (FLONASE) 50 MCG/ACT nasal spray Spray 2 sprays in nostril daily      gabapentin (NEURONTIN) 100 MG capsule Take 100 mg by mouth 3 times daily      ipratropium (ATROVENT) 0.06 % nasal spray Spray 2 sprays into both nostrils 2 times daily      lamoTRIgine (LAMICTAL) 25 MG tablet Take 12.5 mg by mouth daily      meclizine 25 MG CHEW Take 25 mg by mouth every 6 hours as needed for dizziness      omeprazole (PRILOSEC) 20 MG DR capsule Take 20 mg by mouth daily      Respiratory Therapy Supplies (CARETOUCH 2 CPAP HOSE ) MISC CPAP machine for home use at pressure 15 cmw, full face mask x1/3month with a full face cushion x1/mo      spironolactone (ALDACTONE) 25 MG tablet Take 1 tablet (25 mg) by mouth daily 90 tablet 3    torsemide (DEMADEX) 10 MG tablet Take 1 tablet (10 mg) by mouth daily 1 tab daily 90 tablet 3    buPROPion (WELLBUTRIN XL) 150 MG 24 hr tablet Take 150 mg by mouth      cyclobenzaprine (FLEXERIL) 5 MG tablet Take 5 mg by mouth daily as needed (Patient not taking: Reported on 6/21/2024)      mirabegron (MYRBETRIQ) 50 MG 24 hr tablet Take 1 tablet by mouth daily (Patient not taking: Reported on 1/23/2023)         ALLERGIES     Allergies   Allergen Reactions    Seasonal Allergies     Statins        PAST MEDICAL HISTORY:  Past Medical History:   Diagnosis Date    Corey's esophagus     Bladder cancer (H) 2022    high grade, followed at [place] [facility]    CAD (coronary artery disease) 02/2016    mild, nonobstructive per cath 2016    Concussion 2016    fall went to ER--now with dizzy and memory issue    Degenerative disc disease, lumbar     Depression with anxiety     Diastolic congestive heart failure (H)     non compliant LV, HFpEF    Diastolic heart failure (H)     Gastroesophageal reflux disease 1999    lap  [facility]issen at [facility] june 1999    HTN (hypertension)     Hyperlipidemia     IBS (irritable bowel syndrome)     on colestid for diarrhea not cholesterol    Impaired glucose tolerance     Lichen simplex chronicus     Metabolic syndrome     Mild ascending aorta dilation (H24)     40mm    Osteomyelitis of right leg (H) 1979    Restrictive lung disease     Dr No, mild restriction PFT    Sleep apnea     cpap    Venous disease     lower extremity       PAST SURGICAL HISTORY:  Past Surgical History:   Procedure Laterality Date    CARPAL TUNNEL RELEASE RT/LT Bilateral     CHOLECYSTECTOMY      CV RIGHT HEART CATH MEASUREMENTS RECORDED Right 7/9/2020    Procedure: Right Heart Cath WITH FULL OXIMETRY;  Surgeon: Boaz Bustillo MD;  Location:  HEART CARDIAC CATH LAB    CV RIGHT HEART CATH MEASUREMENTS RECORDED N/A 7/29/2021    Procedure: Right Heart Cath rest and exercise with elana in [place];  Surgeon: Dago Mckeon MD;  Location:  HEART CARDIAC CATH LAB    CV RIGHT HEART EXERCISE STRESS STUDY N/A 7/29/2021    Procedure: Right Heart Exercise Stress Study;  Surgeon: Dago Mckeon MD;  Location:  HEART CARDIAC CATH LAB    KNEE SURGERY      arthroscopic (pt doesn't recall which knee)    NISSEN FUNDOPLICATION      ROTATOR CUFF REPAIR RT/LT Right        FAMILY HISTORY:  Family History   Problem Relation Age of Onset    Other Cancer Mother     Hypertension Mother     Colon Cancer Father     Asthma Father        SOCIAL HISTORY:  Social History     Socioeconomic History    Marital status:      Spouse name: None    Number of children: None    Years of education: None    Highest education level: None   Occupational History    Occupation:      Comment: service food machines   Tobacco Use    Smoking status: Never    Smokeless tobacco: Never   Substance and Sexual Activity    Alcohol use: Yes     Alcohol/week: 0.0 standard drinks of alcohol     Comment: occ   Other Topics Concern     Caffeine Concern No     Comment: occ    Special Diet No    Exercise No     Social Determinants of Health     Financial Resource Strain: Low Risk  (7/3/2023)    Received from [facility]    Overall Financial Resource Strain (CARDIA)     Difficulty of Paying Living Expenses: Not hard at all   Food Insecurity: No Food Insecurity (3/21/2024)    Received from [facility]    Hunger Vital Sign     Worried About Running Out of Food in the Last Year: Never true     Ran Out of Food in the Last Year: Never true   Transportation Needs: No Transportation Needs (3/21/2024)    Received from [facility]    PRAPARE - Transportation     Lack of Transportation (Medical): No     Lack of Transportation (Non-Medical): No   Physical Activity: Insufficiently Active (3/21/2024)    Received from [facility]    Exercise Vital Sign     Days of Exercise per Week: 1 day     Minutes of Exercise per Session: 10 min   Stress: Stress Concern Present (2/26/2023)    Received from [facility], [facility]    [place] [employer] - Occupational Stress Questionnaire     Feeling of Stress : To some extent   Social Connections: Socially Integrated (2/26/2023)    Received from [facility], [facility]    Social Connection and Isolation Panel [NHANES]     Frequency of Communication with Friends and Family: More than three times a week     Frequency of Social Gatherings with Friends and Family: Twice a week     Attends Ca[employer] Services: More than 4 times per year     Active Member of Clubs or Organizations: Yes     Attends Club or Organization Meetings: More than 4 times per year     Marital Status:    Interpersonal Safety: Not At Risk (7/3/2023)    Received from [facility]    Humiliation, Afraid, Rape, and Kick questionnaire     Fear of Current or Ex-Partner: No     Emotionally Abused: No     Physically Abused: No     Sexually Abused: No   Housing Stability: Low Risk  (3/21/2024)    Received from [facility]    Housing  "Stability     What is your living situation today?: I have a steady place to live       Review of Systems:  Skin:  not assessed     Eyes:  not assessed glasses  ENT:  not assessed    Respiratory:  Positive for cough;shortness of breath;sleep apnea;CPAP  Cardiovascular:    dizziness;Positive for;edema;fatigue  Gastroenterology: not assessed    Genitourinary:  not assessed    Musculoskeletal:  not assessed    Neurologic:  not assessed    Psychiatric:  not assessed    Heme/Lymph/Imm:  not assessed    Endocrine:  not assessed      Physical Exam:  Vitals: /64 (BP Location: Right arm, Patient Position: Sitting, Cuff Size: Adult Large)   Pulse 60   Ht 1.753 m (5' 9\")   Wt 120.6 kg (265 lb 12.8 oz)   SpO2 94%   BMI 39.25 kg/m   Orthostatic Vitals from 06/19/24 0847 to 06/21/24 0847    Date and Time Orthostatic BP Orthostatic Pulse Patient Position BP   Location Cuff Size   06/21/24 0753 -- -- Sitting Right arm Adult Large         Constitutional:           Skin:             Head:           Eyes:           Lymph:      ENT:           Neck:           Respiratory:            Cardiac:                                                           GI:           Extremities and Muscular Skeletal:                 Neurological:           Psych:         Recent Lab Results:  LIPID RESULTS:  Lab Results   Component Value Date    CHOL 114 06/20/2024    CHOL 106 06/21/2021    HDL 32 (L) 06/20/2024    HDL 35 (L) 06/21/2021    LDL 41 06/20/2024    LDL 38 06/21/2021    TRIG 204 (H) 06/20/2024    TRIG 164 (H) 06/21/2021       LIVER ENZYME RESULTS:  Lab Results   Component Value Date    ALT 22 06/05/2023    ALT 34 06/21/2021       CBC RESULTS:  Lab Results   Component Value Date    WBC 8.4 07/29/2021    WBC 6.9 07/09/2020    RBC 4.56 07/29/2021    RBC 4.58 07/09/2020    HGB 14.3 07/29/2021    HGB 14.2 07/09/2020    HCT 42.0 07/29/2021    HCT 42.9 07/09/2020    MCV 92 07/29/2021    MCV 94 07/09/2020    MCH 31.4 07/29/2021    MCH 31.0 " 07/09/2020    MCHC 34.0 07/29/2021    MCHC 33.1 07/09/2020    RDW 13.7 07/29/2021    RDW 13.7 07/09/2020     07/29/2021     (L) 07/09/2020       BMP RESULTS:  Lab Results   Component Value Date     06/20/2024     06/21/2021    POTASSIUM 3.9 06/20/2024    POTASSIUM 3.4 07/22/2022    POTASSIUM 4.2 06/21/2021    CHLORIDE 103 06/20/2024    CHLORIDE 106 07/22/2022    CHLORIDE 107 06/21/2021    CO2 21 (L) 06/20/2024    CO2 29 07/22/2022    CO2 24 06/21/2021    ANIONGAP 14 06/20/2024    ANIONGAP 5 07/22/2022    ANIONGAP 5 06/21/2021     (H) 06/20/2024     (H) 07/22/2022     (H) 06/21/2021    BUN 26.7 (H) 06/20/2024    BUN 27 07/22/2022    BUN 32 (H) 06/21/2021    CR 1.26 (H) 06/20/2024    CR 1.30 (H) 06/21/2021    GFRESTIMATED 62 06/20/2024    GFRESTIMATED 57 (L) 06/21/2021    GFRESTBLACK 66 06/21/2021    MARGA 9.0 06/20/2024    MARGA 8.8 06/21/2021        A1C RESULTS:  Lab Results   Component Value Date    A1C 5.6 06/29/2022    A1C 5.6 01/26/2016       INR RESULTS:  Lab Results   Component Value Date    INR 1.05 07/29/2021    INR 1.09 07/09/2020    INR 1.09 02/01/2016           CC  No referring provider defined for this encounter.

## 2024-08-27 DIAGNOSIS — I50.30 HEART FAILURE WITH PRESERVED EJECTION FRACTION, NYHA CLASS I (H): ICD-10-CM

## 2024-08-27 RX ORDER — DAPAGLIFLOZIN 10 MG/1
10 TABLET, FILM COATED ORAL DAILY
Qty: 90 TABLET | Refills: 3 | Status: SHIPPED | OUTPATIENT
Start: 2024-08-27

## 2025-06-15 ENCOUNTER — HEALTH MAINTENANCE LETTER (OUTPATIENT)
Age: 70
End: 2025-06-15

## (undated) DEVICE — TOTE ANGIO CORP PC15AT SAN32CC83O

## (undated) DEVICE — MEDITRACE MULTIFUNTION ADULT RADIOTRANSPARENT ELECTRODE FOR ZOLL

## (undated) DEVICE — INTRO SHEATH 7FRX10CM PINNACLE RSS702

## (undated) DEVICE — SYR ANGIOGRAPHY MULTIUSE KIT ACIST 014612

## (undated) DEVICE — DEFIB PRO-PADZ LVP LQD GEL ADULT 8900-2105-01

## (undated) DEVICE — Device

## (undated) DEVICE — SMART CAPNOLINE H PLUS, ADULT/INTERMEDIATE O2, LONG

## (undated) DEVICE — KIT HAND CONTROL ANGIOTOUCH ACIST 65CM AT-P65

## (undated) DEVICE — MANIFOLD KIT ANGIO AUTOMATED 014613

## (undated) DEVICE — NDL PERC ENTRY THINWALL 18GA 7.0" G00166

## (undated) DEVICE — GUIDEWIRE VASC 0.025X260X15CM J-TIP G02384

## (undated) RX ORDER — LIDOCAINE HYDROCHLORIDE 40 MG/ML
SOLUTION TOPICAL
Status: DISPENSED
Start: 2020-07-09

## (undated) RX ORDER — HEPARIN SODIUM 200 [USP'U]/100ML
INJECTION, SOLUTION INTRAVENOUS
Status: DISPENSED
Start: 2020-07-09

## (undated) RX ORDER — HEPARIN SODIUM 1000 [USP'U]/ML
INJECTION, SOLUTION INTRAVENOUS; SUBCUTANEOUS
Status: DISPENSED
Start: 2021-07-29

## (undated) RX ORDER — HEPARIN SODIUM 200 [USP'U]/100ML
INJECTION, SOLUTION INTRAVENOUS
Status: DISPENSED
Start: 2021-07-29

## (undated) RX ORDER — FENTANYL CITRATE 50 UG/ML
INJECTION, SOLUTION INTRAMUSCULAR; INTRAVENOUS
Status: DISPENSED
Start: 2021-07-29

## (undated) RX ORDER — FENTANYL CITRATE 50 UG/ML
INJECTION, SOLUTION INTRAMUSCULAR; INTRAVENOUS
Status: DISPENSED
Start: 2020-07-09

## (undated) RX ORDER — LIDOCAINE HYDROCHLORIDE 10 MG/ML
INJECTION, SOLUTION EPIDURAL; INFILTRATION; INTRACAUDAL; PERINEURAL
Status: DISPENSED
Start: 2020-07-09

## (undated) RX ORDER — FLUMAZENIL 0.1 MG/ML
INJECTION, SOLUTION INTRAVENOUS
Status: DISPENSED
Start: 2020-07-09

## (undated) RX ORDER — LIDOCAINE HYDROCHLORIDE 10 MG/ML
INJECTION, SOLUTION EPIDURAL; INFILTRATION; INTRACAUDAL; PERINEURAL
Status: DISPENSED
Start: 2021-07-29

## (undated) RX ORDER — REGADENOSON 0.08 MG/ML
INJECTION, SOLUTION INTRAVENOUS
Status: DISPENSED
Start: 2020-05-21

## (undated) RX ORDER — NALOXONE HYDROCHLORIDE 0.4 MG/ML
INJECTION, SOLUTION INTRAMUSCULAR; INTRAVENOUS; SUBCUTANEOUS
Status: DISPENSED
Start: 2020-07-09

## (undated) RX ORDER — GLYCOPYRROLATE 0.2 MG/ML
INJECTION, SOLUTION INTRAMUSCULAR; INTRAVENOUS
Status: DISPENSED
Start: 2020-07-09

## (undated) RX ORDER — HEPARIN SODIUM 1000 [USP'U]/ML
INJECTION, SOLUTION INTRAVENOUS; SUBCUTANEOUS
Status: DISPENSED
Start: 2020-07-09